# Patient Record
Sex: MALE | Race: WHITE | NOT HISPANIC OR LATINO | Employment: FULL TIME | URBAN - METROPOLITAN AREA
[De-identification: names, ages, dates, MRNs, and addresses within clinical notes are randomized per-mention and may not be internally consistent; named-entity substitution may affect disease eponyms.]

---

## 2018-01-09 NOTE — RESULT NOTES
Verified Results  ([address] [facility]) Lipid Panel [id number] 07:23AM Hailee Lilly     Test Name Result Flag Reference   Cholesterol, Total 177 mg/dL  100-199   Triglycerides 114 mg/dL  0-149   HDL Cholesterol 35 mg/dL L >39   According to ATP-III Guidelines, HDL-C >59 mg/dL is considered a  negative risk factor for CHD     VLDL Cholesterol Shady 23 mg/dL  5-40   LDL Cholesterol Calc 119 mg/dL H 0-99     (LC) CBC/Diff Ambiguous Default [id number] 07:23AM Hailee Lilly     Test Name Result Flag Reference   WBC 8 2 x10E3/uL  3 4-10 8   RBC 4 99 x10E6/uL  4 14-5 80   Hemoglobin 15 2 g/dL  12 6-17 7   Hematocrit 43 9 %  37 5-51 0   MCV 88 fL  79-97   MCH 30 5 pg  26 6-33 0   MCHC 34 6 g/dL  31 5-35 7   RDW 13 5 %  12 3-15 4   Platelets 934 R29C4/MQ  150-379   Neutrophils 59 %     Lymphs 28 %     Monocytes 9 %     Eos 2 %     Basos 1 %     Neutrophils (Absolute) 4 9 x10E3/uL  1 4-7 0   Lymphs (Absolute) 2 3 x10E3/uL  0 7-3 1   Monocytes(Absolute) 0 8 x10E3/uL  0 1-0 9   Eos (Absolute) 0 1 x10E3/uL  0 0-0 4   Baso (Absolute) 0 0 x10E3/uL  0 0-0 2   Immature Granulocytes 1 %     Immature Grans (Abs) 0 1 x10E3/uL  0 0-0 1     (1) COMPREHENSIVE METABOLIC PANEL [id number] 62:39RP Hailee Vijaya     Test Name Result Flag Reference   Glucose, Serum 100 mg/dL H 65-99   BUN 18 mg/dL  6-24   Creatinine, Serum 0 85 mg/dL  0 76-1 27   eGFR If NonAfricn Am 97 mL/min/1 73  >59   eGFR If Africn Am 113 mL/min/1 73  >59   BUN/Creatinine Ratio 21 H 9-20   Sodium, Serum 142 mmol/L  136-144   Potassium, Serum 4 9 mmol/L  3 5-5 2   Chloride, Serum 105 mmol/L     Carbon Dioxide, Total 23 mmol/L  18-29   Calcium, Serum 9 1 mg/dL  8 7-10 2   Protein, Total, Serum 7 1 g/dL  6 0-8 5   Albumin, Serum 4 4 g/dL  3 5-5 5   Globulin, Total 2 7 g/dL  1 5-4 5   A/G Ratio 1 6  1 1-2 5   Bilirubin, Total 0 6 mg/dL  0 0-1 2   Alkaline Phosphatase, S 59 IU/L     AST (SGOT) 20 IU/L  0-40   ALT (SGPT) 32 IU/L  0-44     (LC) PSA Total+% Free [id number] 07:23AM Suzette Suarez     Test Name Result Flag Reference   Prostate Specific Ag, Serum 1 1 ng/mL  0 0-4 0   Roche ECLIA methodology  According to the American Urological Association, Serum PSA should  decrease and remain at undetectable levels after radical  prostatectomy  The AUA defines biochemical recurrence as an initial  PSA value 0 2 ng/mL or greater followed by a subsequent confirmatory  PSA value 0 2 ng/mL or greater  Values obtained with different assay methods or kits cannot be used  interchangeably  Results cannot be interpreted as absolute evidence  of the presence or absence of malignant disease  PSA, Free 0 47 ng/mL  N/A   Roche ECLIA methodology  % Free PSA 42 7 %     The table below lists the probability of prostate cancer for  men with non-suspicious CLAUDIO results and total PSA between  4 and 10 ng/mL, by patient age All Mcpherson, [address] [facility],  209:5938)  % Free PSA       50-64 yr        65-75 yr                    0 00-10 00%        56%             55%                   10 01-15 00%        24%             35%                   15 01-20 00%        17%             23%                   20 01-25 00%        10%             20%                        >25 00%         5%              9%  Please note:  Tejal et al did not make specific                recommendations regarding the use of                percent free PSA for any other population                of men         Discussion/Summary   labs are normal

## 2018-01-13 NOTE — MISCELLANEOUS
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)   5  Colon cancer screening (V76 51) (Z12 11)   6  Dietary counseling (V65 3) (Z71 3)   7  Need for influenza vaccination (V04 81) (Z23)   8  Obesity (278 00) (E66 9)   9  Sciatica (724 3) (M54 30)   10  Varicose veins with swelling (454 8) (I83 899)   11  Weight gain (783 1) (R63 5)    Current Meds   1  Allegra 60 MG TABS (Fexofenadine HCl); TAKE 1 TABLET DAILY; Therapy: (Recorded:42Woe9412) to Recorded   2  Radha 5-20 MG Oral Tablet; TAKE 1 TABLET ONCE DAILY; Therapy: 64NPP1185 to (Evaluate:95Ejb1632); Last Rx:[id number] Ordered   3  Desloratadine 5 MG Oral Tablet; TAKE 1 TABLET DAILY; Therapy: 51QPL3066 to Recorded   4  Fluticasone Propionate 50 MCG/ACT Nasal Suspension; Therapy: 40GPL3583 to Recorded   5  Glucosamine 500 MG TABS; Take 1 tablet daily; Therapy: (Recorded:61Jog2507) to Recorded   6  MoviPrep 100 GM Oral Solution Reconstituted; USE AS DIRECTED; Therapy: 33JUC8794 to (Last Rx:16Nov2015)  Requested for: 10UML6934 Ordered   7  Naproxen 500 MG Oral Tablet; TAKE 1 TABLET EVERY 12 HOURS AS NEEDED; Therapy: 68AJN8650 to (Evaluate:68Zep0387)  Requested for: 25LCE2511; Last   Rx:29Jun2015 Ordered    Allergies    1  Radha TABS    2   No Known Environmental Allergies    Signatures   Electronically signed by : Lidia Wood, ; Feb 10 2016  1:32PM EST                       (Author)

## 2018-10-08 ENCOUNTER — OFFICE VISIT (OUTPATIENT)
Dept: FAMILY MEDICINE CLINIC | Facility: CLINIC | Age: 58
End: 2018-10-08
Payer: COMMERCIAL

## 2018-10-08 DIAGNOSIS — M25.561 ACUTE PAIN OF RIGHT KNEE: ICD-10-CM

## 2018-10-08 DIAGNOSIS — Z23 NEED FOR INFLUENZA VACCINATION: ICD-10-CM

## 2018-10-08 DIAGNOSIS — I10 ESSENTIAL HYPERTENSION: ICD-10-CM

## 2018-10-08 DIAGNOSIS — Z12.11 SCREENING FOR COLON CANCER: ICD-10-CM

## 2018-10-08 DIAGNOSIS — Z00.00 HEALTH MAINTENANCE EXAMINATION: ICD-10-CM

## 2018-10-08 DIAGNOSIS — Z76.89 ENCOUNTER TO ESTABLISH CARE WITH NEW DOCTOR: Primary | ICD-10-CM

## 2018-10-08 PROCEDURE — 90686 IIV4 VACC NO PRSV 0.5 ML IM: CPT | Performed by: FAMILY MEDICINE

## 2018-10-08 PROCEDURE — 90471 IMMUNIZATION ADMIN: CPT | Performed by: FAMILY MEDICINE

## 2018-10-08 PROCEDURE — 99214 OFFICE O/P EST MOD 30 MIN: CPT | Performed by: STUDENT IN AN ORGANIZED HEALTH CARE EDUCATION/TRAINING PROGRAM

## 2018-10-08 RX ORDER — CHLORTHALIDONE 25 MG/1
12.5 TABLET ORAL DAILY
Qty: 30 TABLET | Refills: 2 | Status: SHIPPED | OUTPATIENT
Start: 2018-10-08 | End: 2020-01-29

## 2018-10-08 NOTE — PROGRESS NOTES
Assessment/Plan:    Diagnoses and all orders for this visit:    #1: Encounter to establish care with new doctor    #2: Health maintenance examination  -     Lipid Panel with Direct LDL reflex; Future  -     Comprehensive metabolic panel; Future  -     HEMOGLOBIN A1C W/ EAG ESTIMATION; Future    #3: Screening for colon cancer  Patient not interested in colonoscopy; Discussed Cologuard, for which patient showed interest; Forms Completed    #4: Essential hypertension  Discontinue Radha; Will start Chlorthalidone 25 mg tablet; Take 0 5 tablets (12 5 mg total) by mouth daily    #5: Acute pain of right knee  Likely osteoarthritic changes; Advised OTC NSAIDs prn    #6: Need for influenza vaccination  -     SYRINGE/SINGLE-DOSE VIAL: influenza vaccine, [id number], quadrivalent, 0 5 mL, preservative-free, for patients 3+ yr (FLUZONE)    #7: BMI 34 0-34 9,adult  Counseled on Diet/Lifestyle modification; Encouraged to start a exercise program -- 30min a day 3-4x/week that includes weight-training and cardiovascular resistance training      Subjective:      Patient ID: Angeline Sutton is a 62 y o  male  HPI    62year old male with a PMH of HTN presents to clinic to establish care  Previously followed with Dr Reanna Wynn  Medical history as follows     Past Medical History: HTN, Seasonal Allergies  Past Surgical History: None  Current Medications: Flonase, Allegra, Clarinex; reports that he is not taking Radha  Social History: No Alcohol; Former Smoker, 1ppd, started at age 16 and quit at 22; No Illicit Drug Use  Family History: M - Breast Cancer; F - Rheumatoid; 1 Brother (unsure); Wife recently passed away February 2017 due to Scleroderma   Allergies: Seasonal  Preventative: Last Colonoscopy: Has not had one; interested in Cologuard    Additional Concerns:  Knee Pain;  Worse when Sitting; Better with Ambulation; Reports on and off      The following portions of the patient's history were reviewed and updated as appropriate: allergies, current medications, past family history, past medical history, past social history, past surgical history and problem list     Review of Systems   Constitutional: Negative for chills, diaphoresis, fatigue and fever  HENT: Negative for dental problem, rhinorrhea, sinus pain, sinus pressure and trouble swallowing  Eyes: Negative for visual disturbance  Respiratory: Negative for chest tightness, shortness of breath and wheezing  Cardiovascular: Negative for chest pain, palpitations and leg swelling  Gastrointestinal: Negative for abdominal pain, blood in stool, constipation, diarrhea, nausea and vomiting  Genitourinary: Negative for difficulty urinating, discharge, flank pain, hematuria, penile pain and penile swelling  Musculoskeletal: Positive for myalgias  Negative for arthralgias, back pain, gait problem, joint swelling, neck pain and neck stiffness  Skin: Negative for pallor, rash and wound  Neurological: Negative for dizziness, tremors, syncope, weakness and headaches  Psychiatric/Behavioral: Negative for behavioral problems  Objective:    /80   Pulse (!) 113   Resp 20   Ht 5' 11 5" (1 816 m)   Wt 113 kg (250 lb)   SpO2 97%   BMI 34 38 kg/m²    Repeat BP: 140/90     Physical Exam   Constitutional: He is oriented to person, place, and time  He appears well-developed and well-nourished  No distress  HENT:   Head: Normocephalic  Eyes: Pupils are equal, round, and reactive to light  Conjunctivae and EOM are normal  Right eye exhibits no discharge  Left eye exhibits no discharge  No scleral icterus  Neck: Normal range of motion  Cardiovascular: Normal rate, regular rhythm, normal heart sounds and intact distal pulses  Exam reveals no gallop and no friction rub  No murmur heard  Pulmonary/Chest: Effort normal and breath sounds normal  No respiratory distress  He has no wheezes  He has no rales  He exhibits no tenderness  Abdominal: Soft   Bowel sounds are normal  He exhibits no distension and no mass  There is no tenderness  There is no rebound and no guarding  Genitourinary: Penis normal    Musculoskeletal: Normal range of motion  He exhibits no edema, tenderness or deformity  Neurological: He is alert and oriented to person, place, and time  Skin: No rash noted  He is not diaphoretic  No erythema  No pallor  Nursing note and vitals reviewed

## 2018-10-09 VITALS
HEIGHT: 72 IN | OXYGEN SATURATION: 97 % | DIASTOLIC BLOOD PRESSURE: 80 MMHG | RESPIRATION RATE: 20 BRPM | BODY MASS INDEX: 33.86 KG/M2 | WEIGHT: 250 LBS | SYSTOLIC BLOOD PRESSURE: 162 MMHG | HEART RATE: 113 BPM

## 2020-01-13 ENCOUNTER — OFFICE VISIT (OUTPATIENT)
Dept: URGENT CARE | Facility: CLINIC | Age: 60
End: 2020-01-13
Payer: COMMERCIAL

## 2020-01-13 VITALS
RESPIRATION RATE: 16 BRPM | HEART RATE: 109 BPM | DIASTOLIC BLOOD PRESSURE: 82 MMHG | TEMPERATURE: 99.3 F | OXYGEN SATURATION: 97 % | HEIGHT: 70 IN | SYSTOLIC BLOOD PRESSURE: 131 MMHG | WEIGHT: 278 LBS | BODY MASS INDEX: 39.8 KG/M2

## 2020-01-13 DIAGNOSIS — N20.0 KIDNEY STONE: Primary | ICD-10-CM

## 2020-01-13 PROCEDURE — 99213 OFFICE O/P EST LOW 20 MIN: CPT | Performed by: PREVENTIVE MEDICINE

## 2020-01-13 NOTE — LETTER
January 13, 2020     Patient: Ervin Banerjee   YOB: 1960   Date of Visit: 1/13/2020       To Whom It May Concern: It is my medical opinion that Bridgett Venegas may return to work on 1/13/2020  If you have any questions or concerns, please don't hesitate to call           Sincerely,        Yvette Mazariegos MD    CC: No Recipients

## 2020-01-13 NOTE — PATIENT INSTRUCTIONS
Kidney Stones   AMBULATORY CARE:   Kidney stones  form in the urinary system when the water and waste in your urine are out of balance  When this happens, certain types of waste crystals separate from the urine  The crystals build up and form kidney stones  Kidney stones can be made of uric acid, calcium, phosphate, or oxalate crystals  You may have 1 or more kidney stones  Common symptoms include the following:   · Pain in the middle of your back that moves across to your side or that may spread to your groin    · Nausea and vomiting    · Urge to urinate often, burning feeling when you urinate, or pink or red urine    · Tenderness in your lower back, side, or stomach  Seek care immediately if:   · You have vomiting that is not relieved by medicine  Contact your healthcare provider if:   · You have a fever  · You have trouble passing urine  · You see blood in your urine  · You have severe pain  · You have any questions or concerns about your condition or care  Treatment for kidney stones  may include any of the following:  · NSAIDs , such as ibuprofen, help decrease swelling, pain, and fever  This medicine is available with or without a doctor's order  NSAIDs can cause stomach bleeding or kidney problems in certain people  If you take blood thinner medicine, always ask your healthcare provider if NSAIDs are safe for you  Always read the medicine label and follow directions  · Prescription medicine  may be given  Ask how to take this medicine safely  · Medicines  to balance your electrolytes may be needed  · A procedure or surgery  to remove the kidney stones may be needed if they do not pass on their own  Your treatment will depend on the size and location of your kidney stones  Manage your symptoms:   · Drink plenty of liquids  Your healthcare provider may tell you to drink at least 8 to 12 (eight-ounce) cups of liquids each day   This helps flush out the kidney stones when you urinate  Water is the best liquid to drink  · Strain your urine every time you go to the bathroom  Urinate through a strainer or a piece of thin cloth to catch the stones  Take the stones to your healthcare provider so they can be sent to the lab for tests  This will help your healthcare providers plan the best treatment for you  · Eat a variety of healthy foods  Healthy foods include fruits, vegetables, whole-grain breads, low-fat dairy products, beans, and fish  You may need to limit how much sodium (salt) or protein you eat  Ask for information about the best foods for you  · Exercise regularly  Your stones may pass more easily if you stay active  Ask about the best activities for you  After you pass your kidney stones:  Once you have passed your kidney stones, your healthcare provider may  order a 24-hour urine test  Results from a 24-hour urine test will help your healthcare provider plan ways to prevent more stones from forming  If you are told to do a 24-hour test, your healthcare provider will give you more instructions  Follow up with your healthcare provider as directed:  Write down your questions so you remember to ask them during your visits  © 2017 [facility] Information is for End User's use only and may not be sold, redistributed or otherwise used for commercial purposes  All illustrations and images included in CareNotes® are the copyrighted property of [employer] , [employer]  or Krish Coleman  The above information is an  only  It is not intended as medical advice for individual conditions or treatments  Talk to your doctor, nurse or pharmacist before following any medical regimen to see if it is safe and effective for you

## 2020-01-14 NOTE — PROGRESS NOTES
[address]        NAME: Darci Walls is a 61 y o  male  : 1960    MRN: [id number]  DATE: 2020  TIME: 4:55 PM    Assessment and Plan   Kidney stone [N20 0]  1  Kidney stone           Patient Instructions       Follow up with PCP in 3-5 days  Proceed to  ER if symptoms worsen  Chief Complaint     Chief Complaint   Patient presents with    Flank Pain     had  what  he described as kidney pain x 2 days urgency         History of Present Illness       Flank Pain   This is a new problem  The current episode started in the past 7 days  The problem occurs intermittently  The problem has been resolved since onset  The pain is present in the sacro-iliac  The quality of the pain is described as aching and cramping  The pain does not radiate  The pain is at a severity of 0/10  The patient is experiencing no pain  The symptoms are aggravated by stress  Associated symptoms include dysuria  He has tried nothing for the symptoms  The treatment provided significant relief  Review of Systems   Review of Systems   Constitutional: Negative  HENT: Negative  Respiratory: Negative  Cardiovascular: Negative  Genitourinary: Positive for dysuria and flank pain  Musculoskeletal: Positive for back pain           Current Medications       Current Outpatient Medications:     chlorthalidone 25 mg tablet, Take 0 5 tablets (12 5 mg total) by mouth daily (Patient not taking: Reported on 2020), Disp: 30 tablet, Rfl: 2    desloratadine (CLARINEX) 5 MG tablet, Take 1 tablet by mouth daily, Disp: , Rfl:     fexofenadine (ALLEGRA ALLERGY) 60 MG tablet, Take 1 tablet by mouth daily, Disp: , Rfl:     fluticasone (FLONASE) 50 mcg/act nasal spray, into each nostril, Disp: , Rfl:     Glucosamine-Chondroitin-Vit D3 750-600-500 MG-MG-UNIT TABS, Take 1 tablet by mouth daily, Disp: , Rfl:     naproxen (NAPROSYN) 500 mg tablet, Take 1 tablet by mouth every 12 (twelve) hours as needed, Disp: , Rfl:   PEG 3350-KCl-NaCl-NaSulf-Na Asc-C (MOVIPREP) 100 g, Take by mouth, Disp: , Rfl:     Current Allergies     Allergies as of 01/13/2020 - Reviewed 01/13/2020   Allergen Reaction Noted    Amlodipine-olmesartan Swelling 07/13/2015            The following portions of the patient's history were reviewed and updated as appropriate: allergies, current medications, past family history, past medical history, past social history, past surgical history and problem list      Past Medical History:   Diagnosis Date    Ankle edema 07/13/2015    Obesity     Organic impotence     Seasonal allergies        No past surgical history on file  Family History   Problem Relation Age of Onset    Alzheimer's disease Mother     Cancer Mother          Medications have been verified  Objective   /82   Pulse (!) 109   Temp 99 3 °F (37 4 °C)   Resp 16   Ht 5' 10" (1 778 m)   Wt 126 kg (278 lb)   SpO2 97%   BMI 39 89 kg/m²        Physical Exam     Physical Exam   Constitutional: He appears well-developed and well-nourished  HENT:   Head: Normocephalic  Cardiovascular: Normal rate and regular rhythm  Pulmonary/Chest: Effort normal and breath sounds normal    Abdominal: Soft  Musculoskeletal: Normal range of motion

## 2020-01-29 ENCOUNTER — OFFICE VISIT (OUTPATIENT)
Dept: FAMILY MEDICINE CLINIC | Facility: CLINIC | Age: 60
End: 2020-01-29
Payer: COMMERCIAL

## 2020-01-29 VITALS
RESPIRATION RATE: 16 BRPM | DIASTOLIC BLOOD PRESSURE: 88 MMHG | TEMPERATURE: 98.7 F | WEIGHT: 275 LBS | SYSTOLIC BLOOD PRESSURE: 128 MMHG | HEIGHT: 70 IN | HEART RATE: 84 BPM | BODY MASS INDEX: 39.37 KG/M2

## 2020-01-29 DIAGNOSIS — Z13.6 SCREENING FOR CARDIOVASCULAR, RESPIRATORY, AND GENITOURINARY DISEASES: ICD-10-CM

## 2020-01-29 DIAGNOSIS — Z13.1 SCREENING FOR DIABETES MELLITUS (DM): ICD-10-CM

## 2020-01-29 DIAGNOSIS — Z13.89 SCREENING FOR CARDIOVASCULAR, RESPIRATORY, AND GENITOURINARY DISEASES: ICD-10-CM

## 2020-01-29 DIAGNOSIS — J30.2 SEASONAL ALLERGIC RHINITIS, UNSPECIFIED TRIGGER: ICD-10-CM

## 2020-01-29 DIAGNOSIS — Z23 NEED FOR VACCINATION: ICD-10-CM

## 2020-01-29 DIAGNOSIS — Z12.5 PROSTATE CANCER SCREENING: ICD-10-CM

## 2020-01-29 DIAGNOSIS — Z13.83 SCREENING FOR CARDIOVASCULAR, RESPIRATORY, AND GENITOURINARY DISEASES: ICD-10-CM

## 2020-01-29 DIAGNOSIS — E66.9 OBESITY (BMI 30-39.9): ICD-10-CM

## 2020-01-29 DIAGNOSIS — Z23 IMMUNIZATION DUE: ICD-10-CM

## 2020-01-29 DIAGNOSIS — Z12.11 COLON CANCER SCREENING: ICD-10-CM

## 2020-01-29 DIAGNOSIS — R73.03 PREDIABETES: ICD-10-CM

## 2020-01-29 DIAGNOSIS — Z00.00 HEALTHCARE MAINTENANCE: Primary | ICD-10-CM

## 2020-01-29 PROCEDURE — 90471 IMMUNIZATION ADMIN: CPT

## 2020-01-29 PROCEDURE — 90472 IMMUNIZATION ADMIN EACH ADD: CPT

## 2020-01-29 PROCEDURE — 99396 PREV VISIT EST AGE 40-64: CPT | Performed by: FAMILY MEDICINE

## 2020-01-29 PROCEDURE — 90715 TDAP VACCINE 7 YRS/> IM: CPT

## 2020-01-29 PROCEDURE — 90682 RIV4 VACC RECOMBINANT DNA IM: CPT

## 2020-01-29 RX ORDER — DIPHENOXYLATE HYDROCHLORIDE AND ATROPINE SULFATE 2.5; .025 MG/1; MG/1
1 TABLET ORAL DAILY
COMMUNITY
End: 2020-12-28

## 2020-01-29 RX ORDER — FEXOFENADINE HYDROCHLORIDE 60 MG/1
60 TABLET, FILM COATED ORAL DAILY
Qty: 90 TABLET | Refills: 1 | Status: SHIPPED | OUTPATIENT
Start: 2020-01-29 | End: 2020-07-09

## 2020-01-29 RX ORDER — FLUTICASONE PROPIONATE 50 MCG
2 SPRAY, SUSPENSION (ML) NASAL DAILY
Qty: 3 BOTTLE | Refills: 1 | Status: SHIPPED | OUTPATIENT
Start: 2020-01-29 | End: 2020-07-27

## 2020-01-29 NOTE — PROGRESS NOTES
Assessment/Plan:    No problem-specific Assessment & Plan notes found for this encounter  cpe today    May call in future if needs urology for kidney stone hx and flank pain    Monitor bp, work on TLC, no bp meds at present, has yearly CDL per pt    Back pain better  No hematuria    AR stable, cont meds if covered by insurance, otherwise get otc     Diagnoses and all orders for this visit:    Healthcare maintenance    Need for vaccination  -     influenza vaccine, quadrivalent, recombinant, PF, 0 5 mL    Colon cancer screening  -     Ambulatory referral to Gastroenterology; Future    Screening for cardiovascular, respiratory, and genitourinary diseases  -     Lipid Panel with Direct LDL reflex; Future    Screening for diabetes mellitus (DM)  -     Comprehensive metabolic panel; Future    Prostate cancer screening  -     PSA, Total Screen; Future    Immunization due  -     Cancel: influenza vaccine, 9831-2717, quadrivalent, recombinant, PF, 0 5 mL, for patients 18 yr+ (FLUBLOK)  -     TDAP VACCINE GREATER THAN OR EQUAL TO 6YO IM    Seasonal allergic rhinitis, unspecified trigger  -     fexofenadine (ALLEGRA ALLERGY) 60 MG tablet; Take 1 tablet (60 mg total) by mouth daily  -     fluticasone (FLONASE) 50 mcg/act nasal spray; 2 sprays into each nostril daily    Obesity (BMI 30-39  9)    Prediabetes    Other orders  -     multivitamin (THERAGRAN) TABS; Take 1 tablet by mouth daily              Return if symptoms worsen or fail to improve  Subjective:      Patient ID: Earlene Lay is a 61 y o  male      Chief Complaint   Patient presents with   Savita Barefoot       HPI  Was at [facility] over 3y ago  htn on meds at that time, stopped  Wife   from scleroderma complications  Stopped bp meds over 2y  CDL every year per pt  Has been good per pt last few years    No tob/etoh  No exercise program  Wt gain in past 2y, 50#  Planning better diet past 2w    Was depressed before but getting better  Prediabetic in 2016 at 100  Last labs 2016    No fam hx of colon cancer  No colonoscopy      The following portions of the patient's history were reviewed and updated as appropriate: allergies, current medications, past family history, past medical history, past social history, past surgical history and problem list     Review of Systems   Respiratory: Negative for shortness of breath  Cardiovascular: Negative for chest pain and leg swelling  Current Outpatient Medications   Medication Sig Dispense Refill    multivitamin (THERAGRAN) TABS Take 1 tablet by mouth daily      fexofenadine (ALLEGRA ALLERGY) 60 MG tablet Take 1 tablet (60 mg total) by mouth daily 90 tablet 1    fluticasone (FLONASE) 50 mcg/act nasal spray 2 sprays into each nostril daily 3 Bottle 1    Glucosamine-Chondroitin-Vit D3 750-600-500 MG-MG-UNIT TABS Take 1 tablet by mouth daily      naproxen (NAPROSYN) 500 mg tablet Take 1 tablet by mouth every 12 (twelve) hours as needed      PEG 3350-KCl-NaCl-NaSulf-Na Asc-C (MOVIPREP) 100 g Take by mouth       No current facility-administered medications for this visit  Objective:    /88   Pulse 84   Temp 98 7 °F (37 1 °C)   Resp 16   Ht 5' 10" (1 778 m)   Wt 125 kg (275 lb)   BMI 39 46 kg/m²        Physical Exam   Constitutional: He appears well-developed  No distress  HENT:   Head: Normocephalic  Right Ear: External ear normal    Left Ear: External ear normal    Mouth/Throat: Oropharynx is clear and moist  No oropharyngeal exudate  Eyes: Conjunctivae are normal  Left eye exhibits no discharge  No scleral icterus  Neck: Neck supple  No thyromegaly present  Cardiovascular: Normal rate, regular rhythm and intact distal pulses  No murmur heard  Pulmonary/Chest: Effort normal and breath sounds normal  No respiratory distress  He has no wheezes  Abdominal: Soft  Bowel sounds are normal  There is no tenderness  There is no guarding  No hernia  Genitourinary: Rectum normal, prostate normal and penis normal    Musculoskeletal: He exhibits no edema or deformity  Neurological: He is alert  He exhibits normal muscle tone  Skin: Skin is warm and dry  No pallor  Psychiatric: His behavior is normal  Thought content normal    Nursing note and vitals reviewed  BMI Counseling: Body mass index is 39 46 kg/m²  The BMI is above normal  Nutrition recommendations include moderation in carbohydrate intake  Exercise recommendations include exercising 3-5 times per week  No pharmacotherapy was ordered             Cici Enamorado DO

## 2020-01-29 NOTE — LETTER
January 29, 2020     Patient: Master Jefferson   YOB: 1960   Date of Visit: 1/29/2020       To Whom it May Concern:    Bill Najera is under my professional care  He was seen in my office on 1/29/2020  Excuse from work today  If you have any questions or concerns, please don't hesitate to call           Sincerely,          Maximiliano Hanson DO        CC: No Recipients

## 2020-01-29 NOTE — PATIENT INSTRUCTIONS
Jian Pierce is a good, though not equivalent, alternative to a colonoscopy for low risk individuals  You can contact our office if you would like this ordered for you, after you find out if your insurance company will cover it  A normal result may be valid for 3 years but then the test would be repeated every 3 years

## 2020-02-13 ENCOUNTER — OFFICE VISIT (OUTPATIENT)
Dept: URGENT CARE | Facility: CLINIC | Age: 60
End: 2020-02-13
Payer: COMMERCIAL

## 2020-02-13 VITALS
HEIGHT: 71 IN | SYSTOLIC BLOOD PRESSURE: 160 MMHG | WEIGHT: 285 LBS | TEMPERATURE: 98.6 F | DIASTOLIC BLOOD PRESSURE: 97 MMHG | OXYGEN SATURATION: 97 % | RESPIRATION RATE: 16 BRPM | HEART RATE: 78 BPM | BODY MASS INDEX: 39.9 KG/M2

## 2020-02-13 DIAGNOSIS — J06.9 VIRAL URI WITH COUGH: Primary | ICD-10-CM

## 2020-02-13 PROCEDURE — 3080F DIAST BP >= 90 MM HG: CPT | Performed by: PHYSICIAN ASSISTANT

## 2020-02-13 PROCEDURE — 3077F SYST BP >= 140 MM HG: CPT | Performed by: PHYSICIAN ASSISTANT

## 2020-02-13 PROCEDURE — 3008F BODY MASS INDEX DOCD: CPT | Performed by: PHYSICIAN ASSISTANT

## 2020-02-13 PROCEDURE — 1036F TOBACCO NON-USER: CPT | Performed by: PHYSICIAN ASSISTANT

## 2020-02-13 PROCEDURE — 99213 OFFICE O/P EST LOW 20 MIN: CPT | Performed by: PHYSICIAN ASSISTANT

## 2020-02-13 NOTE — PATIENT INSTRUCTIONS
Begin an expectorant such as Mucinex  Nasal saline spray or Neti-pot to rinse the nasal passages  Tylenol or Motrin may be taken for fever and discomfort  Check your package labeling as some cold medications already contain fever reducers  Chloraseptic spray, saltwater gargles, warm tea with honey, and throat lozenges may be relieving of throat discomfort  Use a cool mist humidifier at bedtime, turning on hours prior to bed with your bedroom doors shut for maximum relief  Follow up with your family doctor in 3-5 days  Proceed to the ER if symptoms worsen

## 2020-02-13 NOTE — LETTER
February 13, 2020     Patient: Radha Loss   YOB: 1960   Date of Visit: 2/13/2020       To Whom It May Concern: It is my medical opinion that Waynard Pain 2/15/2020 or sooner if feeling better  If you have any questions or concerns, please don't hesitate to call           Sincerely,        Michael Arbeu PA-C

## 2020-02-13 NOTE — PROGRESS NOTES
[facility]        NAME: Davon Buck is a 61 y o  male  : 1960    MRN: [id number]  DATE: 2020  TIME: 11:40 AM    Assessment and Plan   Viral URI with cough [J06 9, B97 89]  1  Viral URI with cough       Patient Instructions   Begin an expectorant such as Mucinex  Nasal saline spray or Neti-pot to rinse the nasal passages  Tylenol or Motrin may be taken for fever and discomfort  Check your package labeling as some cold medications already contain fever reducers  Chloraseptic spray, saltwater gargles, warm tea with honey, and throat lozenges may be relieving of throat discomfort  Use a cool mist humidifier at bedtime, turning on hours prior to bed with your bedroom doors shut for maximum relief  Follow up with your family doctor in 3-5 days  Proceed to the ER if symptoms worsen  The diagnosis, viral etiology, expected course of illness, and treatment plan were reviewed  All questions answered  Precautions given  Patient verbalized understanding and agreement with the treatment plan  Chief Complaint     Chief Complaint   Patient presents with    Sore Throat     patient complains of sore throat, headache, dizziness, cough, congestion started a few days ago  Had flu shot      History of Present Illness     77-year-old male presenting with c/o sore throat x 2 days  Reprots fatigue, feeling feverish-no temperature taken, HA and dizzines  Notes nasal congestion, postnasal drip, sore throat and cough  Cough is harsh  Notes he was somewhat short of breath with lifting boxes at work the other day, otherwise no resting SOB, wheezing, chest pain, or palpitations  No associated nausea, vomiting, or diarrhea  No treatments tried  Sick contacts at work with the flu  No recent travel  Nonsmoker  Had flu shot  Review of Systems   Review of Systems   Constitutional: Positive for fatigue and fever  Negative for chills and diaphoresis     HENT: Positive for congestion, postnasal drip and sore throat  Negative for ear pain and rhinorrhea  Respiratory: Positive for cough  Negative for wheezing  Cardiovascular: Negative for chest pain and palpitations  Gastrointestinal: Negative for abdominal pain, diarrhea, nausea and vomiting  Musculoskeletal: Negative for myalgias  Skin: Negative for rash  Neurological: Positive for dizziness and headaches  Current Medications       Current Outpatient Medications:     fexofenadine (ALLEGRA ALLERGY) 60 MG tablet, Take 1 tablet (60 mg total) by mouth daily, Disp: 90 tablet, Rfl: 1    fluticasone (FLONASE) 50 mcg/act nasal spray, 2 sprays into each nostril daily, Disp: 3 Bottle, Rfl: 1    multivitamin (THERAGRAN) TABS, Take 1 tablet by mouth daily, Disp: , Rfl:     Glucosamine-Chondroitin-Vit D3 750-600-500 MG-MG-UNIT TABS, Take 1 tablet by mouth daily, Disp: , Rfl:     naproxen (NAPROSYN) 500 mg tablet, Take 1 tablet by mouth every 12 (twelve) hours as needed, Disp: , Rfl:     PEG 3350-KCl-NaCl-NaSulf-Na Asc-C (MOVIPREP) 100 g, Take by mouth, Disp: , Rfl:     Current Allergies     Allergies as of 02/13/2020    (No Known Allergies)            The following portions of the patient's history were reviewed and updated as appropriate: allergies, current medications, past family history, past medical history, past social history, past surgical history and problem list      Past Medical History:   Diagnosis Date    Ankle edema 07/13/2015    Obesity     Organic impotence     Seasonal allergies        History reviewed  No pertinent surgical history  Family History   Problem Relation Age of Onset    Alzheimer's disease Mother     Cancer Mother          Medications have been verified          Objective   /97 (BP Location: Right arm, Patient Position: Sitting, Cuff Size: Large)   Pulse 78   Temp 98 6 °F (37 °C) (Tympanic)   Resp 16   Ht 5' 11" (1 803 m)   Wt 129 kg (285 lb)   SpO2 97%   BMI 39 75 kg/m²        Physical Exam Physical Exam   Constitutional: Vital signs are normal  He appears well-developed and well-nourished  He is cooperative  He appears ill  No distress  HENT:   Head: Normocephalic and atraumatic  Right Ear: Hearing, tympanic membrane, external ear and ear canal normal    Left Ear: Hearing, tympanic membrane, external ear and ear canal normal    Nose: Rhinorrhea present  Mouth/Throat: Uvula is midline, oropharynx is clear and moist and mucous membranes are normal  Mucous membranes are not pale, not dry and not cyanotic  No oral lesions  No uvula swelling  No oropharyngeal exudate, posterior oropharyngeal edema, posterior oropharyngeal erythema or tonsillar abscesses  Mildly inflamed nasal mucosa  Eyes: Conjunctivae and lids are normal  Right eye exhibits no discharge and no exudate  Left eye exhibits no discharge and no exudate  Neck: Trachea normal and phonation normal  Neck supple  No tracheal tenderness present  No neck rigidity  No edema and no erythema present  Cardiovascular: Normal rate, regular rhythm and normal heart sounds  Exam reveals no distant heart sounds  Pulmonary/Chest: Effort normal and breath sounds normal  No stridor  No respiratory distress  He has no decreased breath sounds  He has no wheezes  He has no rhonchi  He has no rales  Abdominal: Soft  Bowel sounds are normal  He exhibits no distension and no mass  There is no tenderness  There is no rigidity, no rebound and no guarding  Lymphadenopathy:     He has no cervical adenopathy  Neurological: He is alert  He is not disoriented  No cranial nerve deficit  Coordination and gait normal    Skin: Skin is warm, dry and intact  No rash noted  He is not diaphoretic  No erythema  No pallor  Psychiatric: He has a normal mood and affect  His behavior is normal  Judgment and thought content normal    Nursing note and vitals reviewed

## 2020-03-13 ENCOUNTER — OFFICE VISIT (OUTPATIENT)
Dept: URGENT CARE | Facility: CLINIC | Age: 60
End: 2020-03-13
Payer: COMMERCIAL

## 2020-03-13 ENCOUNTER — APPOINTMENT (OUTPATIENT)
Dept: RADIOLOGY | Facility: CLINIC | Age: 60
End: 2020-03-13
Payer: COMMERCIAL

## 2020-03-13 VITALS
SYSTOLIC BLOOD PRESSURE: 186 MMHG | RESPIRATION RATE: 20 BRPM | HEIGHT: 72 IN | OXYGEN SATURATION: 97 % | TEMPERATURE: 98.5 F | DIASTOLIC BLOOD PRESSURE: 104 MMHG | BODY MASS INDEX: 38.47 KG/M2 | WEIGHT: 284 LBS | HEART RATE: 81 BPM

## 2020-03-13 DIAGNOSIS — M25.469 KNEE SWELLING: ICD-10-CM
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
## 2020-03-13 DIAGNOSIS — S76.312A LEFT HAMSTRING STRAIN, INITIAL ENCOUNTER: ICD-10-CM

## 2020-03-13 DIAGNOSIS — M25.469 KNEE SWELLING: Primary | ICD-10-CM

## 2020-03-13 DIAGNOSIS — M17.12 OSTEOARTHRITIS OF LEFT KNEE, UNSPECIFIED OSTEOARTHRITIS TYPE: ICD-10-CM

## 2020-03-13 PROCEDURE — 3077F SYST BP >= 140 MM HG: CPT | Performed by: PHYSICIAN ASSISTANT

## 2020-03-13 PROCEDURE — 3008F BODY MASS INDEX DOCD: CPT | Performed by: PHYSICIAN ASSISTANT

## 2020-03-13 PROCEDURE — 99213 OFFICE O/P EST LOW 20 MIN: CPT | Performed by: PHYSICIAN ASSISTANT

## 2020-03-13 PROCEDURE — 3080F DIAST BP >= 90 MM HG: CPT | Performed by: PHYSICIAN ASSISTANT

## 2020-03-13 PROCEDURE — 73564 X-RAY EXAM KNEE 4 OR MORE: CPT

## 2020-03-13 PROCEDURE — 1036F TOBACCO NON-USER: CPT | Performed by: PHYSICIAN ASSISTANT

## 2020-03-13 NOTE — LETTER
March 13, 2020     Patient: Subha Mendoza   YOB: 1960   Date of Visit: 3/13/2020       To Whom it May Concern:    Bhaskar Goodpasture was seen in my clinic on 3/13/2020  He may return to work on 3/17/2020  If you have any questions or concerns, please don't hesitate to call           Sincerely,          Camilla Edwards PA-C        CC: No Recipients

## 2020-03-13 NOTE — PROGRESS NOTES
[facility]        NAME: Shaun Childress is a 61 y o  male  : 1960    MRN: [id number]  DATE: 2020  TIME: 12:09 PM    Assessment and Plan   Knee swelling [M25 469]  1  Knee swelling  XR knee 4+ vw left injury   2  Left hamstring strain, initial encounter     3  Injury while descending stairs     4  Osteoarthritis of left knee, unspecified osteoarthritis type           Patient Instructions     Discussed condition with patient  X-ray of the left knee reveals osteoarthritis but there are no acute findings  I suspect that he has sustained strain of his distal left hamstring due to a hyper extension of the knee  I recommended ice/heat, over-the-counter ibuprofen, elevation, gentle intermittent stretching, and limited weight-bearing  If his condition persists or worsens, then I would recommend ortho consult  Follow up with PCP in 3-5 days  Proceed to  ER if symptoms worsen  Chief Complaint     Chief Complaint   Patient presents with    Knee Pain     Pt reports of left knee pain with swelling after misstep on stairs  Pt's knee appears swollen         History of Present Illness       Patient presents after injuring his left lower extremity late last night  He reports that he was walking downstairs and when he reached last step, he stepped down with his left foot and immediately felt pain in his posterior left knee  He denies any anterior knee joint pain or swelling  He reports it is worse with walking weight-bearing  Denies previous significant issues with the left knee  He has not applied ice or heat or taken anything over-the-counter to this point for symptoms  Review of Systems   Review of Systems   Constitutional: Negative  Respiratory: Negative  Cardiovascular: Negative  Gastrointestinal: Negative  Genitourinary: Negative      Musculoskeletal:        Left posterior knee pain status post injury         Current Medications       Current Outpatient Medications:    fexofenadine (ALLEGRA ALLERGY) 60 MG tablet, Take 1 tablet (60 mg total) by mouth daily, Disp: 90 tablet, Rfl: 1    fluticasone (FLONASE) 50 mcg/act nasal spray, 2 sprays into each nostril daily, Disp: 3 Bottle, Rfl: 1    Glucosamine-Chondroitin-Vit D3 750-600-500 MG-MG-UNIT TABS, Take 1 tablet by mouth daily, Disp: , Rfl:     multivitamin (THERAGRAN) TABS, Take 1 tablet by mouth daily, Disp: , Rfl:     naproxen (NAPROSYN) 500 mg tablet, Take 1 tablet by mouth every 12 (twelve) hours as needed, Disp: , Rfl:     PEG 3350-KCl-NaCl-NaSulf-Na Asc-C (MOVIPREP) 100 g, Take by mouth, Disp: , Rfl:     Current Allergies     Allergies as of 03/13/2020    (No Known Allergies)            The following portions of the patient's history were reviewed and updated as appropriate: allergies, current medications, past family history, past medical history, past social history, past surgical history and problem list      Past Medical History:   Diagnosis Date    Ankle edema 07/13/2015    Obesity     Organic impotence     Seasonal allergies        History reviewed  No pertinent surgical history  Family History   Problem Relation Age of Onset    Alzheimer's disease Mother     Cancer Mother          Medications have been verified  Objective   BP (!) 186/104   Pulse 81   Temp 98 5 °F (36 9 °C)   Resp 20   Ht 6' (1 829 m)   Wt 129 kg (284 lb)   SpO2 97%   BMI 38 52 kg/m²        Physical Exam     Physical Exam   Constitutional: He is oriented to person, place, and time  He appears well-developed and well-nourished  No distress  Musculoskeletal:   Left posterior knee with tenderness to palpation popliteal fossa and superiorly over the distal hamstring but there is no ecchymosis or other deformity noted  Knee joint is nontender and exhibits full range of motion in flexion but patient reports pain posteriorly with passive knee extension  Patient has antalgic gait    Pain not worsened with varus or valgus stress on joint  Neurological: He is alert and oriented to person, place, and time  Psychiatric: He has a normal mood and affect  Vitals reviewed

## 2020-03-13 NOTE — PATIENT INSTRUCTIONS
Hamstring Injury   WHAT YOU NEED TO KNOW:   A hamstring injury is a bruise, strain, or tear to one of your hamstring muscles  Your hamstring muscles are in the back of your thigh and help bend and straighten your leg  DISCHARGE INSTRUCTIONS:   Medicines:   · Medicines  can help decrease pain and swelling  · Take your medicine as directed  Contact your healthcare provider if you think your medicine is not helping or if you have side effects  Tell him of her if you are allergic to any medicine  Keep a list of the medicines, vitamins, and herbs you take  Include the amounts, and when and why you take them  Bring the list or the pill bottles to follow-up visits  Carry your medicine list with you in case of an emergency  Self-care:   · Rest  your hamstring muscles as directed  · You may need to use crutches  until you can put weight on your injured leg without pain  This will help decrease stress and strain on your hamstring muscles  · Apply ice  on the back of your thigh for 15 to 20 minutes every hour or as directed  Use an ice pack, or put crushed ice in a plastic bag  Cover it with a towel  Ice helps prevent tissue damage and decreases swelling and pain  · Wear an elastic bandage  to help decrease swelling  It should be snug but not tight  · Elevate  your leg above the level of your heart as often as you can  This will help decrease swelling and pain  Prop your leg on pillows or blankets to keep it elevated comfortably  · Go to physical therapy  A physical therapist teaches you exercises to help improve movement and strength, and to decrease pain  Prevent another hamstring injury:   · Ask when you can return to your usual activities  You may injure your hamstring muscles more if you start activity too soon  · Warm up and stretch before and after you exercise  This helps loosen your muscles and decrease stress on your hamstring muscles   Slowly increase time, distance, and how often you train  A sudden increase may cause injury  Follow up with your healthcare provider as directed:  Write down your questions so you remember to ask them during your visits  Contact your healthcare provider if:   · You have a fever  · Your signs and symptoms do not improve with treatment  · You have questions or concerns about your condition or care  Return to the emergency department if:   · Your lower leg or foot is pale or blue, and feels cool when you touch it  · You have severe pain  · You cannot bend or straighten your leg  © 2017 [employer]  Information is for End User's use only and may not be sold, redistributed or otherwise used for commercial purposes  All illustrations and images included in CareNotes® are the copyrighted property of [employer] M [employer]  or [employer]  The above information is an  only  It is not intended as medical advice for individual conditions or treatments  Talk to your doctor, nurse or pharmacist before following any medical regimen to see if it is safe and effective for you  Hamstring Exercises   WHAT YOU NEED TO KNOW:   Hamstring exercises help strengthen and stretch the muscles that support your lower back, hips, and knee  This decreases pain, improves movement, and decreases your risk of future injury  DISCHARGE INSTRUCTIONS:   Contact your healthcare provider if:   · You have sharp or worsening pain during exercise or at rest     · You have questions or concern about your condition, care, or exercise program   Exercise safely:   · Move slowly and smoothly  Avoid fast or jerky motions  This will help prevent another injury  · Breathe normally  Do not hold your breath  It is important to breathe in and out so you do not tense up during exercise  Tension could prevent your muscles from stretching  · Do the exercises and stretches on both legs  Do this so the muscles on both legs remain strong and flexible  · Stop if you feel sharp pain or an increase in pain  Stop the exercise and contact your healthcare provider if you have these symptoms  It is normal to feel some discomfort, such as a dull ache, during exercise  Regular exercise will help decrease your discomfort over time  · Warm up before you stretch and exercise  This will help prevent an injury  Walk or ride a stationary bike for 5 to 10 minutes  How to perform stretching exercises:  Ask your healthcare provider how often to do these stretches:  · Hamstring stretch with a towel:  Lie on your back on the floor  Bend both legs so your feet rest flat  Lift one leg off the floor and loop a towel around your foot  Grasp the ends of the towel and slowly straighten your lifted leg  Use the towel to gently pull your leg toward you until you feel the stretch  Keep your leg straight and your foot flexed toward your body  Hold for 30 seconds  Use a longer towel if needed  · Sitting hamstring stretch:  Sit on the floor with both legs straight in front of you  Do not point your toes or flex your feet  Place your palms on the floor and slide your hands forward until you feel the stretch  Keep your back straight and do not lock your knees  Hold the stretch for 30 seconds  · Standing hamstring stretch:  Stand with your feet hips distance apart  Place one leg so it rests on a firm surface, such as a table or chair  Keep your toes pointing up  Slide both hands down the outside of your leg until you feel a stretch  Keep your chest lifted and your back straight  Hold for 30 seconds  · Sitting wide-leg stretch:  Sit on the floor and extend your legs as wide as possible  Keep your legs straight and lean over one leg  Slide your hands forward until you feel a stretch  Keep your chest lifted and your back straight  Hold for 30 seconds  How to perform strengthening exercises:  Always do strengthening exercises after you stretch   As you get stronger your healthcare provider may tell you to you add weights or more repetitions to your strengthening exercises  · Hamstring curls:  Place your hand on a wall or the back of a chair for balance  Place the weight in one of your legs  Lift the other leg and raise your heel toward your buttocks  Hold for 5 seconds  Slowly lower your leg until it is a few inches off the floor  Do 3 sets of 10  Repeat on other side  · Straight leg raise:  Lie on the floor with your face down  Rest your forehead on your folded arms  Keep your body in a straight line  Keep your hip bones on the floor, and tighten the butt and thigh muscles of your injured leg  Keep one leg straight and raise it toward the ceiling as high as you can  Hold for 5 seconds  Slowly return to the starting position  Do 3 sets of 10  Repeat on other side  · Half squats:  Stand with your feet shoulder distance apart  Rest your hands on the front of your thighs or reach them out in front of you  You may hold on to the back of a chair or wall for balance  Keep your chest lifted and lower your hips about 10 inches, as if you are going to sit  Make sure your weight is in your heels and hold for 5 seconds  Keep your weight in your heels and slowly stand  Do 3 sets of 10  Follow up with your healthcare provider as directed:  Write down your questions so you remember to ask them during your visits  © 2017 [employer] [employer] Information is for End User's use only and may not be sold, redistributed or otherwise used for commercial purposes  All illustrations and images included in CareNotes® are the copyrighted property of [employer] M , Inc  or Krish Coleman  The above information is an  only  It is not intended as medical advice for individual conditions or treatments  Talk to your doctor, nurse or pharmacist before following any medical regimen to see if it is safe and effective for you      Osteoarthritis WHAT YOU NEED TO KNOW:   Osteoarthritis (OA) occurs when cartilage (tissue that cushions a joint) wears away slowly and causes the bones to rub together  OA is a long-term condition that often affects the hands, neck, lower back, knees, and hips  OA is also called arthrosis or degenerative joint disease  DISCHARGE INSTRUCTIONS:   Return to the emergency department if:   · You have severe pain  · You cannot move your joint  Contact your healthcare provider if:   · You have a fever  · Your joint is red and tender  · You have questions or concerns about your condition or care  Medicines:   · Acetaminophen  is used to decrease pain  It is available without a doctor's order  Ask how much to take and how often to take it  Follow directions  Acetaminophen can cause liver damage if not taken correctly  · NSAIDs , such as ibuprofen, help decrease swelling, pain, and fever  This medicine is available with or without a doctor's order  NSAIDs can cause stomach bleeding or kidney problems in certain people  If you take blood thinner medicine, always ask your healthcare provider if NSAIDs are safe for you  Always read the medicine label and follow directions  · Prescription pain medicine  may be given to decrease severe pain if other medicines do not work  Take the medicine as directed  Do not wait until the pain is severe before you take your medicine  · Take your medicine as directed  Contact your healthcare provider if you think your medicine is not helping or if you have side effects  Tell him or her if you are allergic to any medicine  Keep a list of the medicines, vitamins, and herbs you take  Include the amounts, and when and why you take them  Bring the list or the pill bottles to follow-up visits  Carry your medicine list with you in case of an emergency  Follow up with your healthcare provider as directed:  Write down your questions so you remember to ask them during your visits     Go to physical therapy as directed:  A physical therapist teaches you exercises to help improve movement and strength, and to decrease pain in your joints  The exercises also help lower your risk for loss of function  Manage your OA:   · Stay active  Physical activity may reduce your pain and improve your ability to do daily activities  Avoid activities that cause pain  Ask your healthcare provider what type of exercise would be best for you  · Maintain a healthy weight  This helps decrease the strain on the joints in your back, hips, knees, ankles, and feet  Ask your healthcare provider how much you should weigh  Ask him to help you create a weight loss plan if you are overweight  · Use heat or ice  on your joints as directed  Heat and ice help decrease pain, swelling, and muscle spasms  Use a heating pad on a low setting or take a warm bath  Use an ice pack, or put crushed ice in a plastic bag  Cover it with a towel  · Massage  the muscles around the joint to relieve pain and stiffness  · Use a cane, crutches, or a walker  to protect and relieve pressure on your ankle, knee, and hip joints  You may also be prescribed shoe inserts to decrease pressure in your joints  · Wear flat or low-heeled shoes  This will help decrease pain and reduce pressure on your ankle, knee, and hip joints  © 2017 [address] Information is for End User's use only and may not be sold, redistributed or otherwise used for commercial purposes  All illustrations and images included in CareNotes® are the copyrighted property of [employer]  or Krish Coleman  The above information is an  only  It is not intended as medical advice for individual conditions or treatments  Talk to your doctor, nurse or pharmacist before following any medical regimen to see if it is safe and effective for you

## 2020-03-18 LAB
ALBUMIN SERPL-MCNC: 4.3 G/DL (ref 3.8–4.9)
ALBUMIN/GLOB SERPL: 1.5 {RATIO} (ref 1.2–2.2)
ALP SERPL-CCNC: 66 IU/L (ref 39–117)
ALT SERPL-CCNC: 24 IU/L (ref 0–44)
AST SERPL-CCNC: 19 IU/L (ref 0–40)
BILIRUB SERPL-MCNC: 0.7 MG/DL (ref 0–1.2)
BUN SERPL-MCNC: 14 MG/DL (ref 6–24)
BUN/CREAT SERPL: 16 (ref 9–20)
CALCIUM SERPL-MCNC: 9.6 MG/DL (ref 8.7–10.2)
CHLORIDE SERPL-SCNC: 105 MMOL/L (ref 96–106)
CHOLEST SERPL-MCNC: 154 MG/DL (ref 100–199)
CO2 SERPL-SCNC: 24 MMOL/L (ref 20–29)
CREAT SERPL-MCNC: 0.88 MG/DL (ref 0.76–1.27)
GLOBULIN SER-MCNC: 2.9 G/DL (ref 1.5–4.5)
GLUCOSE SERPL-MCNC: 98 MG/DL (ref 65–99)
HDLC SERPL-MCNC: 36 MG/DL
LDLC SERPL CALC-MCNC: 96 MG/DL (ref 0–99)
MICRODELETION SYND BLD/T FISH: NORMAL
POTASSIUM SERPL-SCNC: 5.2 MMOL/L (ref 3.5–5.2)
PROT SERPL-MCNC: 7.2 G/DL (ref 6–8.5)
PSA SERPL-MCNC: 1 NG/ML (ref 0–4)
SL AMB EGFR AFRICAN AMERICAN: 109 ML/MIN/1.73
SL AMB EGFR NON AFRICAN AMERICAN: 94 ML/MIN/1.73
SODIUM SERPL-SCNC: 143 MMOL/L (ref 134–144)
TRIGL SERPL-MCNC: 111 MG/DL (ref 0–149)

## 2020-03-30 ENCOUNTER — OFFICE VISIT (OUTPATIENT)
Dept: URGENT CARE | Facility: CLINIC | Age: 60
End: 2020-03-30
Payer: COMMERCIAL

## 2020-03-30 ENCOUNTER — APPOINTMENT (OUTPATIENT)
Dept: RADIOLOGY | Facility: CLINIC | Age: 60
End: 2020-03-30
Payer: COMMERCIAL

## 2020-03-30 VITALS
HEART RATE: 102 BPM | TEMPERATURE: 99.7 F | SYSTOLIC BLOOD PRESSURE: 140 MMHG | HEIGHT: 71 IN | BODY MASS INDEX: 39.79 KG/M2 | RESPIRATION RATE: 18 BRPM | DIASTOLIC BLOOD PRESSURE: 90 MMHG | WEIGHT: 284.2 LBS | OXYGEN SATURATION: 97 %

## 2020-03-30 DIAGNOSIS — M71.21 BAKER'S CYST OF KNEE, RIGHT: Primary | ICD-10-CM

## 2020-03-30 DIAGNOSIS — M25.561 ACUTE PAIN OF RIGHT KNEE: ICD-10-CM

## 2020-03-30 PROCEDURE — 1036F TOBACCO NON-USER: CPT | Performed by: PHYSICIAN ASSISTANT

## 2020-03-30 PROCEDURE — 99214 OFFICE O/P EST MOD 30 MIN: CPT | Performed by: PHYSICIAN ASSISTANT

## 2020-03-30 PROCEDURE — 3008F BODY MASS INDEX DOCD: CPT | Performed by: PHYSICIAN ASSISTANT

## 2020-03-30 PROCEDURE — 3077F SYST BP >= 140 MM HG: CPT | Performed by: PHYSICIAN ASSISTANT

## 2020-03-30 PROCEDURE — 73562 X-RAY EXAM OF KNEE 3: CPT

## 2020-03-30 PROCEDURE — 3080F DIAST BP >= 90 MM HG: CPT | Performed by: PHYSICIAN ASSISTANT

## 2020-03-30 RX ORDER — NAPROXEN 500 MG/1
500 TABLET ORAL 2 TIMES DAILY WITH MEALS
Qty: 30 TABLET | Refills: 0 | Status: SHIPPED | OUTPATIENT
Start: 2020-03-30 | End: 2020-11-03

## 2020-03-30 NOTE — LETTER
March 30, 2020     Patient: Davon Buck   YOB: 1960   Date of Visit: 3/30/2020       To Whom It May Concern: It is my medical opinion that Felecia Brock should remain out of work until 4/2/2020  If you have any questions or concerns, please don't hesitate to call           Sincerely,        [facility] PROVIDER    CC: No Recipients

## 2020-03-30 NOTE — PATIENT INSTRUCTIONS
Bakers Cyst   WHAT YOU NEED TO KNOW:   A Bakers cyst, or popliteal cyst, is a bulging lump behind your knee  Inside the lump is a sac filled with fluid  The cyst is caused by fluid buildup in your knee joint  This can happen if you have a knee injury, such as a cartilage tear  Osteoarthritis or rheumatoid arthritis can also cause an abnormal buildup of joint fluid  DISCHARGE INSTRUCTIONS:   Return to the emergency department if:   · You have severe pain  · You have bruising on the ankle below the cyst     · Your calf turns blue below the cyst     · Your calf or knee is swollen or bleeding  Contact your healthcare provider if:   · You have a fever  · Your pain does not improve with medicine  · You have questions or concerns about your condition or care  Medicines:   · NSAIDs  help decrease swelling and pain  This medicine is available without a doctor's order  Your healthcare provider will tell you which medicine to take and how often to take it  Follow directions  NSAIDs can cause stomach bleeding or kidney problems if they are not taken correctly  · Take your medicine as directed  Contact your healthcare provider if you think your medicine is not helping or if you have side effects  Tell him of her if you are allergic to any medicine  Keep a list of the medicines, vitamins, and herbs you take  Include the amounts, and when and why you take them  Bring the list or the pill bottles to follow-up visits  Carry your medicine list with you in case of an emergency  Care for your knee:   · Rest as needed  Limit movement as your knee heals  This will help decrease the risk of more damage to your knee  You may need crutches to take weight off your injured knee  Use crutches as directed  · Ice your knee  Ice helps decrease swelling and pain  Use an ice pack, or put ice in a plastic bag  Cover the ice pack with a towel and place the ice on your knee for 15 to 20 minutes, 3 to 4 times each day   Do this for 2 to 3 days  · Support your knee  Wrap your knee with an elastic bandage  Ask your healthcare provider if you need a brace for more support  This will help decrease swelling and movement so your knee can heal     · Elevate your knee  Use pillows to raise your knee above the level of your heart as often as you can  This will help decrease swelling  · Go to physical therapy as directed  A physical therapist teaches you exercises to help improve movement and strength, and to decrease pain  Follow up with your healthcare provider as directed:  Write down your questions so you remember to ask them during your visits  © 2017 [employer]  Information is for End User's use only and may not be sold, redistributed or otherwise used for commercial purposes  All illustrations and images included in CareNotes® are the copyrighted property of [employer]  or [employer]  The above information is an  only  It is not intended as medical advice for individual conditions or treatments  Talk to your doctor, nurse or pharmacist before following any medical regimen to see if it is safe and effective for you  Acute right knee pain: ddx bakers cyst  -We discussed based on his hx and examination he may be experiencing a bakers cyst secondary to his osteoarthritis  -There is no obvious sign of dislocation or fracture on X-ray  There is osteoarthritis appreciated  Awaiting official read  -Ice the area for 20 minutes 3-4 times a day  -Ace wrap the knee for comfort and support   -Elevate the area and rest   -Will refill his Naproxen-he should take this as directed and with meals    -Avoid strenuous activity/sports or gym until your symptoms improve  -Follow up with Dr Hough For further evaluation and management  A physical therapy referral was also made for the patient

## 2020-03-30 NOTE — PROGRESS NOTES
[facility]        NAME: María Webster is a 61 y o  male  : 1960    MRN: [id number]  DATE: 2020  TIME: 4:20 PM    Assessment and Plan   Baker's cyst of knee, right [M71 21]  1  Baker's cyst of knee, right  naproxen (NAPROSYN) 500 mg tablet    Ambulatory referral to Physical Therapy   2  Acute pain of right knee  XR knee 3 vw right non injury         Patient Instructions   Acute right knee pain: ddx bakers cyst  -We discussed based on his hx and examination he may be experiencing a bakers cyst secondary to his osteoarthritis  -There is no obvious sign of dislocation or fracture on X-ray  There is osteoarthritis appreciated  Awaiting official read  -Ice the area for 20 minutes 3-4 times a day  -Ace wrap the knee for comfort and support   -Elevate the area and rest   -Will refill his Naproxen-he should take this as directed and with meals    -Avoid strenuous activity/sports or gym until your symptoms improve  -Follow up with Dr Hough For further evaluation and management  A physical therapy referral was also made for the patient  Follow up with PCP in 3-5 days  Proceed to  ER if symptoms worsen  Chief Complaint     Chief Complaint   Patient presents with    Knee Pain     Started with R knee pain since Thursday - back of knee  Area ism soft and swollen  Using ice and Tylenol  Has limp and cannot bear full weight  History of Present Illness       The patient presents today for right knee pain x four days  The patient states that he began to experience pain over the right popliteal fossa and states that there is associated edema and tenderness of the area  He has been icing the area and taking Tylenol for the pain  He states that he is unable to bear weight and has pain with ambulating, this has caused him to ambulate with a limp  He denies trauma or injury to the site  He denies numbness or tingling of the right lower extremity  He denies ecchymosis or erythema   He denies prior injury or trauma to the right lower extremity  He has been taking Tylenol for the pain with mild relief  Of note he states that he had similar pain of his left knee approximately two weeks ago and had an Xray that showed osteoarthritis  He states that his pain has since resolved  Review of Systems   Review of Systems   Constitutional: Negative for activity change, appetite change, chills, fatigue and fever  Musculoskeletal: Positive for arthralgias, gait problem and joint swelling  Negative for back pain, myalgias, neck pain and neck stiffness  Skin: Negative for color change, pallor, rash and wound  Allergic/Immunologic: Negative for immunocompromised state  Neurological: Negative for dizziness, weakness, light-headedness and numbness  Hematological: Does not bruise/bleed easily           Current Medications       Current Outpatient Medications:     fexofenadine (ALLEGRA ALLERGY) 60 MG tablet, Take 1 tablet (60 mg total) by mouth daily, Disp: 90 tablet, Rfl: 1    fluticasone (FLONASE) 50 mcg/act nasal spray, 2 sprays into each nostril daily, Disp: 3 Bottle, Rfl: 1    Glucosamine-Chondroitin-Vit D3 750-600-500 MG-MG-UNIT TABS, Take 1 tablet by mouth daily, Disp: , Rfl:     multivitamin (THERAGRAN) TABS, Take 1 tablet by mouth daily, Disp: , Rfl:     naproxen (NAPROSYN) 500 mg tablet, Take 1 tablet by mouth every 12 (twelve) hours as needed, Disp: , Rfl:     naproxen (NAPROSYN) 500 mg tablet, Take 1 tablet (500 mg total) by mouth 2 (two) times a day with meals, Disp: 30 tablet, Rfl: 0    PEG 3350-KCl-NaCl-NaSulf-Na Asc-C (MOVIPREP) 100 g, Take by mouth, Disp: , Rfl:     Current Allergies     Allergies as of 03/30/2020    (No Known Allergies)            The following portions of the patient's history were reviewed and updated as appropriate: allergies, current medications, past family history, past medical history, past social history, past surgical history and problem list  Past Medical History:   Diagnosis Date    Ankle edema 07/13/2015    Obesity     Organic impotence     Seasonal allergies        History reviewed  No pertinent surgical history  Family History   Problem Relation Age of Onset    Alzheimer's disease Mother     Cancer Mother          Medications have been verified  Objective   /90   Pulse 102   Temp 99 7 °F (37 6 °C)   Resp 18   Ht 5' 11" (1 803 m)   Wt 129 kg (284 lb 3 2 oz)   SpO2 97%   BMI 39 64 kg/m²        Physical Exam     Physical Exam   Constitutional: He appears well-developed and well-nourished  No distress  Cardiovascular: Normal rate, regular rhythm and normal heart sounds  Pulmonary/Chest: Effort normal and breath sounds normal    Musculoskeletal:        Right knee: He exhibits decreased range of motion and swelling  He exhibits no effusion, no ecchymosis, no deformity, no laceration, no erythema, normal alignment, no LCL laxity, normal patellar mobility, no bony tenderness, normal meniscus and no MCL laxity  Tenderness found  Medial joint line tenderness noted  No lateral joint line, no MCL, no LCL and no patellar tendon tenderness noted  There is tenderness over the medial joint line and popliteal fossa of the right knee  There is no erythema or ecchymosis of the area  Mild bulging/swelling over the popliteal fossa  No laxity or crepitus appreciated on exam  There is pain with flexion and extension of the knee with limited ROM with passive extension  Strength is 5/5 of the lower extremities bilaterally  Sensation intact  Patient observed ambulating with a limp due to the pain  Neurological: He has normal strength  No sensory deficit  He exhibits normal muscle tone  Gait normal    Skin: Skin is warm, dry and intact  Capillary refill takes less than 2 seconds  No bruising and no ecchymosis noted  He is not diaphoretic  No erythema  Psychiatric: He has a normal mood and affect   His behavior is normal    Nursing note and vitals reviewed

## 2020-04-29 ENCOUNTER — TELEMEDICINE (OUTPATIENT)
Dept: FAMILY MEDICINE CLINIC | Facility: CLINIC | Age: 60
End: 2020-04-29
Payer: COMMERCIAL

## 2020-04-29 DIAGNOSIS — J30.2 SEASONAL ALLERGIC RHINITIS, UNSPECIFIED TRIGGER: ICD-10-CM

## 2020-04-29 DIAGNOSIS — M17.0 BILATERAL PRIMARY OSTEOARTHRITIS OF KNEE: ICD-10-CM

## 2020-04-29 DIAGNOSIS — M25.561 ACUTE PAIN OF RIGHT KNEE: Primary | ICD-10-CM

## 2020-04-29 DIAGNOSIS — E66.9 OBESITY (BMI 30-39.9): ICD-10-CM

## 2020-04-29 PROBLEM — Z23 NEED FOR VACCINATION: Status: RESOLVED | Noted: 2020-01-29 | Resolved: 2020-04-29

## 2020-04-29 PROBLEM — R73.03 PREDIABETES: Status: RESOLVED | Noted: 2020-01-29 | Resolved: 2020-04-29

## 2020-04-29 PROCEDURE — 99213 OFFICE O/P EST LOW 20 MIN: CPT | Performed by: FAMILY MEDICINE

## 2020-05-12 ENCOUNTER — TELEPHONE (OUTPATIENT)
Dept: FAMILY MEDICINE CLINIC | Facility: CLINIC | Age: 60
End: 2020-05-12

## 2020-05-12 DIAGNOSIS — M71.22 BAKER'S CYST OF KNEE, LEFT: Primary | ICD-10-CM

## 2020-05-12 DIAGNOSIS — M71.21 BAKER'S CYST OF KNEE, RIGHT: ICD-10-CM

## 2020-05-18 DIAGNOSIS — G89.29 CHRONIC PAIN OF LEFT KNEE: Primary | ICD-10-CM

## 2020-05-18 DIAGNOSIS — M25.562 CHRONIC PAIN OF LEFT KNEE: Primary | ICD-10-CM

## 2020-05-20 ENCOUNTER — HOSPITAL ENCOUNTER (OUTPATIENT)
Dept: RADIOLOGY | Facility: HOSPITAL | Age: 60
Discharge: HOME | End: 2020-05-20
Attending: FAMILY MEDICINE
Payer: COMMERCIAL

## 2020-05-20 DIAGNOSIS — M25.561 ACUTE PAIN OF RIGHT KNEE: ICD-10-CM

## 2020-05-20 DIAGNOSIS — M17.0 BILATERAL PRIMARY OSTEOARTHRITIS OF KNEE: ICD-10-CM

## 2020-05-20 PROCEDURE — 76882 US LMTD JT/FCL EVL NVASC XTR: CPT

## 2020-05-21 PROBLEM — M25.561 ACUTE PAIN OF RIGHT KNEE: Status: RESOLVED | Noted: 2020-03-30 | Resolved: 2020-05-21

## 2020-05-21 PROBLEM — M71.22 BAKER'S CYST OF KNEE, LEFT: Status: ACTIVE | Noted: 2020-05-21

## 2020-06-02 ENCOUNTER — OFFICE VISIT (OUTPATIENT)
Dept: OBGYN CLINIC | Facility: CLINIC | Age: 60
End: 2020-06-02
Payer: COMMERCIAL

## 2020-06-02 VITALS
BODY MASS INDEX: 38.77 KG/M2 | HEART RATE: 71 BPM | WEIGHT: 278 LBS | SYSTOLIC BLOOD PRESSURE: 164 MMHG | TEMPERATURE: 97.6 F | DIASTOLIC BLOOD PRESSURE: 91 MMHG

## 2020-06-02 DIAGNOSIS — M71.22 BAKER'S CYST OF KNEE, LEFT: ICD-10-CM

## 2020-06-02 DIAGNOSIS — M17.0 BILATERAL PRIMARY OSTEOARTHRITIS OF KNEE: Primary | ICD-10-CM

## 2020-06-02 DIAGNOSIS — M71.21 BAKER'S CYST OF KNEE, RIGHT: ICD-10-CM

## 2020-06-02 PROCEDURE — 99244 OFF/OP CNSLTJ NEW/EST MOD 40: CPT | Performed by: ORTHOPAEDIC SURGERY

## 2020-06-02 PROCEDURE — 20610 DRAIN/INJ JOINT/BURSA W/O US: CPT | Performed by: ORTHOPAEDIC SURGERY

## 2020-06-02 RX ORDER — DEXAMETHASONE SODIUM PHOSPHATE 100 MG/10ML
40 INJECTION INTRAMUSCULAR; INTRAVENOUS
Status: COMPLETED | OUTPATIENT
Start: 2020-06-02 | End: 2020-06-02

## 2020-06-02 RX ORDER — LIDOCAINE HYDROCHLORIDE 10 MG/ML
4 INJECTION, SOLUTION INFILTRATION; PERINEURAL
Status: COMPLETED | OUTPATIENT
Start: 2020-06-02 | End: 2020-06-02

## 2020-06-02 RX ADMIN — DEXAMETHASONE SODIUM PHOSPHATE 40 MG: 100 INJECTION INTRAMUSCULAR; INTRAVENOUS at 11:41

## 2020-06-02 RX ADMIN — LIDOCAINE HYDROCHLORIDE 4 ML: 10 INJECTION, SOLUTION INFILTRATION; PERINEURAL at 11:41

## 2020-06-11 ENCOUNTER — TRANSCRIBE ORDERS (OUTPATIENT)
Dept: RADIOLOGY | Facility: HOSPITAL | Age: 60
End: 2020-06-11

## 2020-06-11 ENCOUNTER — HOSPITAL ENCOUNTER (OUTPATIENT)
Dept: RADIOLOGY | Facility: HOSPITAL | Age: 60
Discharge: HOME | End: 2020-06-11
Attending: ORTHOPAEDIC SURGERY | Admitting: ORTHOPAEDIC SURGERY
Payer: COMMERCIAL

## 2020-06-11 ENCOUNTER — TELEPHONE (OUTPATIENT)
Dept: OBGYN CLINIC | Facility: HOSPITAL | Age: 60
End: 2020-06-11

## 2020-06-11 DIAGNOSIS — M71.22 BAKER'S CYST OF KNEE, LEFT: ICD-10-CM

## 2020-06-11 DIAGNOSIS — M71.21 BAKER'S CYST OF KNEE, RIGHT: ICD-10-CM

## 2020-06-11 PROCEDURE — 20611 DRAIN/INJ JOINT/BURSA W/US: CPT

## 2020-06-11 RX ORDER — LIDOCAINE HYDROCHLORIDE 10 MG/ML
5 INJECTION, SOLUTION EPIDURAL; INFILTRATION; INTRACAUDAL; PERINEURAL ONCE
Status: COMPLETED | OUTPATIENT
Start: 2020-06-11 | End: 2020-06-11

## 2020-06-11 RX ADMIN — LIDOCAINE HYDROCHLORIDE 5 ML: 10 INJECTION, SOLUTION EPIDURAL; INFILTRATION; INTRACAUDAL; PERINEURAL at 10:25

## 2020-07-09 DIAGNOSIS — J30.2 SEASONAL ALLERGIC RHINITIS, UNSPECIFIED TRIGGER: ICD-10-CM

## 2020-07-09 RX ORDER — FEXOFENADINE HYDROCHLORIDE 60 MG/1
TABLET, FILM COATED ORAL
Qty: 90 TABLET | Refills: 3 | Status: SHIPPED | OUTPATIENT
Start: 2020-07-09 | End: 2021-04-17

## 2020-07-27 DIAGNOSIS — J30.2 SEASONAL ALLERGIC RHINITIS, UNSPECIFIED TRIGGER: ICD-10-CM

## 2020-07-27 RX ORDER — FLUTICASONE PROPIONATE 50 MCG
SPRAY, SUSPENSION (ML) NASAL
Qty: 48 G | Refills: 3 | Status: SHIPPED | OUTPATIENT
Start: 2020-07-27 | End: 2021-04-17

## 2020-08-05 ENCOUNTER — OFFICE VISIT (OUTPATIENT)
Dept: OBGYN CLINIC | Facility: CLINIC | Age: 60
End: 2020-08-05
Payer: COMMERCIAL

## 2020-08-05 VITALS
HEART RATE: 96 BPM | HEIGHT: 71 IN | DIASTOLIC BLOOD PRESSURE: 85 MMHG | BODY MASS INDEX: 40.99 KG/M2 | TEMPERATURE: 98.3 F | SYSTOLIC BLOOD PRESSURE: 174 MMHG | WEIGHT: 292.8 LBS

## 2020-08-05 DIAGNOSIS — M17.0 BILATERAL PRIMARY OSTEOARTHRITIS OF KNEE: Primary | ICD-10-CM

## 2020-08-05 PROCEDURE — 3008F BODY MASS INDEX DOCD: CPT | Performed by: ORTHOPAEDIC SURGERY

## 2020-08-05 PROCEDURE — 1036F TOBACCO NON-USER: CPT | Performed by: ORTHOPAEDIC SURGERY

## 2020-08-05 PROCEDURE — 20610 DRAIN/INJ JOINT/BURSA W/O US: CPT | Performed by: ORTHOPAEDIC SURGERY

## 2020-08-05 PROCEDURE — 3079F DIAST BP 80-89 MM HG: CPT | Performed by: ORTHOPAEDIC SURGERY

## 2020-08-05 PROCEDURE — 99214 OFFICE O/P EST MOD 30 MIN: CPT | Performed by: ORTHOPAEDIC SURGERY

## 2020-08-05 PROCEDURE — 3077F SYST BP >= 140 MM HG: CPT | Performed by: ORTHOPAEDIC SURGERY

## 2020-08-05 RX ORDER — DEXAMETHASONE SODIUM PHOSPHATE 100 MG/10ML
40 INJECTION INTRAMUSCULAR; INTRAVENOUS
Status: COMPLETED | OUTPATIENT
Start: 2020-08-05 | End: 2020-08-05

## 2020-08-05 RX ORDER — LIDOCAINE HYDROCHLORIDE 10 MG/ML
4 INJECTION, SOLUTION INFILTRATION; PERINEURAL
Status: COMPLETED | OUTPATIENT
Start: 2020-08-05 | End: 2020-08-05

## 2020-08-05 RX ADMIN — LIDOCAINE HYDROCHLORIDE 4 ML: 10 INJECTION, SOLUTION INFILTRATION; PERINEURAL at 16:04

## 2020-08-05 RX ADMIN — LIDOCAINE HYDROCHLORIDE 4 ML: 10 INJECTION, SOLUTION INFILTRATION; PERINEURAL at 16:05

## 2020-08-05 RX ADMIN — DEXAMETHASONE SODIUM PHOSPHATE 40 MG: 100 INJECTION INTRAMUSCULAR; INTRAVENOUS at 16:04

## 2020-08-05 RX ADMIN — DEXAMETHASONE SODIUM PHOSPHATE 40 MG: 100 INJECTION INTRAMUSCULAR; INTRAVENOUS at 16:05

## 2020-08-05 NOTE — LETTER
August 5, 2020     Patient: Silvia Guzman   YOB: 1960   Date of Visit: 8/5/2020       To Whom it May Concern:    Shannon Regan is under my professional care  He was seen in my office on 8/5/2020  He is to return back to work on 08/10/2020  If you have any questions or concerns, please don't hesitate to call           Sincerely,          Nasra Brand MD        CC: No Recipients

## 2020-08-05 NOTE — PROGRESS NOTES
Assessment/Plan:  1  Bilateral primary osteoarthritis of knee  Injection procedure prior authorization    Ambulatory referral to Orthopedic Surgery       Scribe Attestation    I,:   Beulah Zacarias am acting as a scribe while in the presence of the attending physician :        I,:   Khris Hubbard MD personally performed the services described in this documentation    as scribed in my presence :          Jennifer Mitchell did have an ultrasound-guided bilateral Baker cyst aspiration which yielded 5 mL of clear slightly yellow thick fluid from the left Baker cyst and 40 mL of clear slightly yellow thick fluid from the right Baker cyst   Unfortunately, his painful symptoms have since returned  I did provide him with the option of bilateral knee steroid injections today today  He was amendable to this and tolerated the procedure well  I did discuss with him that with obtaining steroid injections today, he is unable to have any surgical intervention for at least 3 months due to the risk of infection  He verbally stated he understood this  He did inquire about hyaluronic acid and I do believe this is reasonable  I did explain to him that the Euflexxa injections are a 3 part series where he will receive 1 injection per week for 3 weeks  I did provide him with a prescription for this today in the office and my office will call him once his insurance approves them  We did engage in a conversation about weight loss as his BMI currently is 40 84 kg/m2  He will require a knee replacement in the future and with this his BMI must be below 40  He states he has been unable to work out due to his knee pains, however is encouraged as he does wish to have surgical intervention  With that, I did provide him with a referral to 1 of my colleagues, Dr Jailyn Benavides  I will have him follow up with him in 6 weeks in hopes he finds relief with the steroid injection and begins his rounds of the Euflexxa injection      Large joint arthrocentesis: R knee  Date/Time: 8/5/2020 4:04 PM  Consent given by: patient  Site marked: site marked  Timeout: Immediately prior to procedure a time out was called to verify the correct patient, procedure, equipment, support staff and site/side marked as required   Supporting Documentation  Indications: pain   Procedure Details  Location: knee - R knee  Needle size: 22 G  Ultrasound guidance: no  Approach: anterolateral  Medications administered: 4 mL lidocaine 1 %; 40 mg dexamethasone 100 mg/10 mL    Patient tolerance: patient tolerated the procedure well with no immediate complications  Dressing:  Sterile dressing applied    Large joint arthrocentesis: L knee  Date/Time: 8/5/2020 4:05 PM  Consent given by: patient  Site marked: site marked  Timeout: Immediately prior to procedure a time out was called to verify the correct patient, procedure, equipment, support staff and site/side marked as required   Supporting Documentation  Indications: pain   Procedure Details  Location: knee - L knee  Needle size: 22 G  Ultrasound guidance: no  Approach: anterolateral  Medications administered: 4 mL lidocaine 1 %; 40 mg dexamethasone 100 mg/10 mL    Patient tolerance: patient tolerated the procedure well with no immediate complications  Dressing:  Sterile dressing applied              Subjective:   Rashmi Moya is a 61 y o  male who presents to the office today for follow-up evaluation of bilateral knee pain, right greater than left  His last visit, I provided him with a prescription for interventional Radiology to drain both of his Baker cyst   He states he had it performed 1 week after his visit and experienced immediate relief  However, he states the fluid has returned  He continues to appreciate pain globally about both his knees  He describes his pain as constant, achy, sore, sharp and moderate in intensity  He states he is unable to work the past couple days due to the discomfort    He does appreciate minimal swelling about the anterior aspect of his knees  He denies taking any pain medication  He denies any mechanical symptoms  He denies any numbness or tingling  He does note he has had a steroid injection once which did provide him with significant relief  Review of Systems   Constitutional: Negative for chills, fever and unexpected weight change  HENT: Negative for hearing loss, nosebleeds and sore throat  Eyes: Negative for pain, redness and visual disturbance  Respiratory: Negative for cough, shortness of breath and wheezing  Cardiovascular: Negative for chest pain, palpitations and leg swelling  Gastrointestinal: Negative for abdominal pain, nausea and vomiting  Endocrine: Negative for polyphagia and polyuria  Genitourinary: Negative for dysuria and hematuria  Musculoskeletal:        See HPI   Skin: Negative for rash and wound  Neurological: Negative for dizziness, numbness and headaches  Psychiatric/Behavioral: Negative for decreased concentration and suicidal ideas  The patient is not nervous/anxious            Past Medical History:   Diagnosis Date    Ankle edema 07/13/2015    Obesity     Organic impotence     Seasonal allergies        Past Surgical History:   Procedure Laterality Date    US GUIDED MSK PROCEDURE  6/11/2020       Family History   Problem Relation Age of Onset    Alzheimer's disease Mother     Cancer Mother        Social History     Occupational History    Not on file   Tobacco Use    Smoking status: Never Smoker    Smokeless tobacco: Never Used   Substance and Sexual Activity    Alcohol use: No    Drug use: Never    Sexual activity: Not on file         Current Outpatient Medications:     fexofenadine (ALLEGRA) 60 MG tablet, TAKE 1 TABLET DAILY, Disp: 90 tablet, Rfl: 3    fluticasone (FLONASE) 50 mcg/act nasal spray, USE 2 SPRAYS IN EACH NOSTRIL DAILY, Disp: 48 g, Rfl: 3    Glucosamine-Chondroitin-Vit D3 750-600-500 MG-MG-UNIT TABS, Take 1 tablet by mouth daily, Disp: , Rfl:     multivitamin (THERAGRAN) TABS, Take 1 tablet by mouth daily, Disp: , Rfl:     naproxen (NAPROSYN) 500 mg tablet, Take 1 tablet (500 mg total) by mouth 2 (two) times a day with meals, Disp: 30 tablet, Rfl: 0    PEG 3350-KCl-NaCl-NaSulf-Na Asc-C (MOVIPREP) 100 g, Take by mouth, Disp: , Rfl:     No Known Allergies    Objective:  Vitals:    08/05/20 1011   BP: (!) 174/85   Pulse: 96   Temp: 98 3 °F (36 8 °C)       Right Knee Exam     Tenderness   The patient is experiencing tenderness in the medial joint line and lateral joint line  Range of Motion   Extension: 0   Flexion: 100     Tests   Varus: negative Valgus: negative  Lachman:  Anterior - negative    Posterior - negative  Drawer:  Anterior - negative    Posterior - negative    Other   Erythema: absent  Sensation: normal  Pulse: present  Effusion: effusion (1+) present    Comments:    Palpable Baker's cyst posteriorly  Left Knee Exam     Range of Motion   Extension: 0   Flexion: 120     Tests   Varus: negative Valgus: negative  Lachman:  Anterior - negative    Posterior - negative  Drawer:  Anterior - negative     Posterior - negative    Other   Erythema: absent  Sensation: normal  Pulse: present  Effusion: effusion (trace) present    Comments:    Palpable Baker's cyst posteriorly          Observations   Left Knee   Positive for effusion (trace)  Right Knee   Positive for effusion (1+)  Physical Exam   Constitutional: He is oriented to person, place, and time  He appears well-developed  HENT:   Head: Normocephalic and atraumatic  Eyes: Pupils are equal, round, and reactive to light  Conjunctivae are normal    Neck: Normal range of motion  Neck supple  Cardiovascular: Normal rate  Pulmonary/Chest: Effort normal  No respiratory distress  Musculoskeletal:      Right knee: He exhibits effusion (1+)  Left knee: He exhibits effusion (trace)        Comments: As noted in HPI   Neurological: He is alert and oriented to person, place, and time  Skin: Skin is warm and dry  Psychiatric: His behavior is normal    Nursing note and vitals reviewed  Bilateral knee xrays are reviewed from 3/2020 demonstrating medial compartment arthritis

## 2020-08-06 ENCOUNTER — TELEPHONE (OUTPATIENT)
Dept: OBGYN CLINIC | Facility: CLINIC | Age: 60
End: 2020-08-06

## 2020-08-06 NOTE — TELEPHONE ENCOUNTER
Patient called asking for a letter for him to be out of work and possibly temporary disability since his knees are still bothering him  He is a  and is not confident that he will be able to preform his job duties  Please advise

## 2020-08-07 ENCOUNTER — TELEPHONE (OUTPATIENT)
Dept: OBGYN CLINIC | Facility: HOSPITAL | Age: 60
End: 2020-08-07

## 2020-08-07 NOTE — TELEPHONE ENCOUNTER
FYI-    Patient is calling stating that he cannot go to work because he is just in too much pain  Karthikeyan Cruz would like to get the short term disability process started and then move forward with scheduling surgery  Karthikeyan Cruz would like the short term disability to begin effective August 3, 2020 and will contact his employer to begin that process    Karthikeyan Cruz also requested that his consult with Dr Dwaine Pressley be moved up and we rescheduled his previously scheduled appointment to next Friday, 08/14/2020

## 2020-08-07 NOTE — TELEPHONE ENCOUNTER
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

## 2020-08-08 ENCOUNTER — TELEPHONE (OUTPATIENT)
Dept: OBGYN CLINIC | Facility: CLINIC | Age: 60
End: 2020-08-08

## 2020-08-08 NOTE — TELEPHONE ENCOUNTER
Patient brought in a disability form  I am a little confused  Was he out of work? Paperwork said disability began 8/3/2020 but we are having him return 8/10? I do not see anything that we have kept him out since 8/3? And is knee arthritis qualify for disability?

## 2020-08-10 NOTE — TELEPHONE ENCOUNTER
Please see tasks in chart  He is  nad wants to go on short term disability  He thinks he can not do his job right now   Dr Elise Mendoza said ok

## 2020-08-14 ENCOUNTER — OFFICE VISIT (OUTPATIENT)
Dept: OBGYN CLINIC | Facility: CLINIC | Age: 60
End: 2020-08-14
Payer: COMMERCIAL

## 2020-08-14 VITALS
BODY MASS INDEX: 39.34 KG/M2 | WEIGHT: 281 LBS | SYSTOLIC BLOOD PRESSURE: 164 MMHG | DIASTOLIC BLOOD PRESSURE: 83 MMHG | HEART RATE: 84 BPM | HEIGHT: 71 IN | TEMPERATURE: 97.3 F

## 2020-08-14 DIAGNOSIS — G89.29 CHRONIC PAIN OF BOTH KNEES: ICD-10-CM

## 2020-08-14 DIAGNOSIS — M25.561 CHRONIC PAIN OF BOTH KNEES: ICD-10-CM

## 2020-08-14 DIAGNOSIS — M17.0 BILATERAL PRIMARY OSTEOARTHRITIS OF KNEE: Primary | ICD-10-CM

## 2020-08-14 DIAGNOSIS — M25.562 CHRONIC PAIN OF BOTH KNEES: ICD-10-CM

## 2020-08-14 PROCEDURE — 3008F BODY MASS INDEX DOCD: CPT | Performed by: ORTHOPAEDIC SURGERY

## 2020-08-14 PROCEDURE — 3079F DIAST BP 80-89 MM HG: CPT | Performed by: ORTHOPAEDIC SURGERY

## 2020-08-14 PROCEDURE — 99214 OFFICE O/P EST MOD 30 MIN: CPT | Performed by: ORTHOPAEDIC SURGERY

## 2020-08-14 PROCEDURE — 1036F TOBACCO NON-USER: CPT | Performed by: ORTHOPAEDIC SURGERY

## 2020-08-14 PROCEDURE — 3077F SYST BP >= 140 MM HG: CPT | Performed by: ORTHOPAEDIC SURGERY

## 2020-08-14 NOTE — PROGRESS NOTES
Assessment/Plan:  1  Bilateral primary osteoarthritis of knee  Ambulatory referral to Orthopedic Surgery   2  Chronic pain of both knees       Scribe Attestation    I,:   Silvestre Avila am acting as a scribe while in the presence of the attending physician :        I,:   Lakshmi Rodriguez, DO personally performed the services described in this documentation    as scribed in my presence :          Inez Chavis is a very pleasant 80-year-old male who presents today for initial evaluation of his chronic bilateral knee pain  After reviewing his imaging and performing a thorough history and physical exam I explained that he is suffering with severe underlying osteoarthritis which does appear to be more advanced in his left knee than his right  Unfortunately, he has failed to find relief despite extensive conservative treatment including maintenance of appropriate weight, activity modification, bracing, use of an assistive device, home exercise program, and injection therapy  I did explain that based on his failure of conservative treatment and the advanced nature of his underlying disease, he is indicated for total knee arthroplasties which would 1st be initiated for his left knee  Unfortunately, I am unable to offer him surgery at this time due to his recent bilateral corticosteroid injections  He does understand that he would be a candidate for surgery beginning on 11/06/2020  I did commended him on his recent weight loss and encouraged him to keep his weight optimized  He plans to return to the office in early October for a surgical consult and consent for a left total knee arthroplasty  He plans to use Tylenol for symptomatic relief until that time  He did inquire about extending his short-term disability and I explained that this would need to be ordered through Dr Wong Duckworth  He will call the office if he wishes to pursue this    We will plan to see him back in early October for consent for left total knee arthroplasty  He will need new x-rays with magnification marker on arrival at that visit  Subjective: Initial evaluation for chronic bilateral knee pain    Patient ID: Arie Quiñonez is a 61 y o  male who presents today for initial evaluation of his chronic bilateral knee pain which is worse on the left than the right  He was recently evaluated for this by Dr Giorgio Ly who diagnosed him with severe osteoarthritis and informed him he would likely need a knee replacement  He also performed bilateral corticosteroid injections on 08/05/2020  for symptomatic relief  At today's visit, he describes diffuse activity related pain about both of his knees that can reach 6/10 on the pain scale  He has been working on weight loss and reports a recent weight loss of 10 lb which he has achieved with his home exercise bike  He also uses a brace for periods of activity which helps him somewhat  He reports that the recent bilateral corticosteroid injections were only effective for approximately three days  He states that he has been on short-term disability at work and is hopeful for bilateral knee replacements  He denies any acute injury or trauma  He denies any distal paresthesias of the lower extremity  Review of Systems   Constitutional: Positive for activity change  Negative for chills, fever and unexpected weight change  HENT: Negative  Negative for hearing loss, nosebleeds and sore throat  Eyes: Negative  Negative for pain, redness and visual disturbance  Respiratory: Negative  Negative for cough, shortness of breath and wheezing  Cardiovascular: Negative  Negative for chest pain, palpitations and leg swelling  Gastrointestinal: Negative  Negative for abdominal pain, nausea and vomiting  Endocrine: Negative  Negative for polyphagia and polyuria  Genitourinary: Negative for dysuria and hematuria  Musculoskeletal: Positive for arthralgias and myalgias  Negative for joint swelling  See HPI   Skin: Negative  Negative for rash and wound  Neurological: Negative  Negative for dizziness, numbness and headaches  Psychiatric/Behavioral: Negative  Negative for decreased concentration and suicidal ideas  The patient is not nervous/anxious  Past Medical History:   Diagnosis Date    Ankle edema 2015    Obesity     Organic impotence     Seasonal allergies        Past Surgical History:   Procedure Laterality Date    US GUIDED MSK PROCEDURE  2020       Family History   Problem Relation Age of Onset    Alzheimer's disease Mother     Cancer Mother        Social History     Occupational History    Not on file   Tobacco Use    Smoking status: Former Smoker     Last attempt to quit: 1970     Years since quittin 0    Smokeless tobacco: Never Used   Substance and Sexual Activity    Alcohol use: No    Drug use: Never    Sexual activity: Not on file         Current Outpatient Medications:     fexofenadine (ALLEGRA) 60 MG tablet, TAKE 1 TABLET DAILY, Disp: 90 tablet, Rfl: 3    fluticasone (FLONASE) 50 mcg/act nasal spray, USE 2 SPRAYS IN EACH NOSTRIL DAILY, Disp: 48 g, Rfl: 3    Glucosamine-Chondroitin-Vit D3 750-600-500 MG-MG-UNIT TABS, Take 1 tablet by mouth daily, Disp: , Rfl:     multivitamin (THERAGRAN) TABS, Take 1 tablet by mouth daily, Disp: , Rfl:     naproxen (NAPROSYN) 500 mg tablet, Take 1 tablet (500 mg total) by mouth 2 (two) times a day with meals, Disp: 30 tablet, Rfl: 0    PEG 3350-KCl-NaCl-NaSulf-Na Asc-C (MOVIPREP) 100 g, Take by mouth, Disp: , Rfl:     No Known Allergies    Objective:  Vitals:    20 0921   BP: 164/83   Pulse: 84   Temp: (!) 97 3 °F (36 3 °C)       Body mass index is 39 19 kg/m²  Right Knee Exam     Tenderness   The patient is experiencing tenderness in the lateral joint line, medial joint line and patella  Range of Motion   Right knee extension: 0  Right knee flexion: 110       Tests   Varus: negative Valgus: negative  Drawer:  Anterior - negative    Posterior - negative  Patellar apprehension: negative    Other   Erythema: absent  Scars: absent  Sensation: normal  Pulse: present  Swelling: none  Effusion: no effusion present    Comments:  Stable at 0, 30 and 90°  Parapatellar crepitance noted with active and passive range of motion  Patellofemoral grind:  Positive      Left Knee Exam     Tenderness   The patient is experiencing tenderness in the lateral joint line, medial joint line and patella  Range of Motion   Left knee extension: 5  Left knee flexion: 100  Tests   Varus: negative Valgus: negative  Drawer:  Anterior - negative     Posterior - negative  Patellar apprehension: negative    Other   Erythema: absent  Scars: absent  Sensation: normal  Pulse: present  Swelling: none  Effusion: no effusion present    Comments:  Stable at 5, 30 and 90°  Parapatellar crepitance noted with active and passive range of motion  Patellofemoral grind:  Positive          Observations   Left Knee   Negative for effusion  Right Knee   Negative for effusion  Physical Exam  Vitals signs and nursing note reviewed  Constitutional:       Appearance: He is well-developed  HENT:      Head: Normocephalic and atraumatic  Eyes:      Conjunctiva/sclera: Conjunctivae normal       Pupils: Pupils are equal, round, and reactive to light  Neck:      Musculoskeletal: Normal range of motion and neck supple  Cardiovascular:      Rate and Rhythm: Normal rate  Pulmonary:      Effort: Pulmonary effort is normal  No respiratory distress  Abdominal:      Palpations: Abdomen is soft  Musculoskeletal:      Right knee: He exhibits no effusion  Left knee: He exhibits no effusion  Comments: As noted in HPI   Skin:     General: Skin is warm and dry  Neurological:      Mental Status: He is alert and oriented to person, place, and time     Psychiatric:         Behavior: Behavior normal          I have personally reviewed pertinent films in PACS  X-ray of the left knee obtained on 03/13/2020 reviewed demonstrating severe degenerative changes evidence by joint space narrowing most prominent in the lateral compartment, sclerosis, marginal osteophytosis, and tibial subluxation  There is no acute fracture, dislocation, lytic or blastic lesion

## 2020-09-09 ENCOUNTER — TELEPHONE (OUTPATIENT)
Dept: OBGYN CLINIC | Facility: CLINIC | Age: 60
End: 2020-09-09

## 2020-09-11 ENCOUNTER — OFFICE VISIT (OUTPATIENT)
Dept: FAMILY MEDICINE CLINIC | Facility: CLINIC | Age: 60
End: 2020-09-11
Payer: COMMERCIAL

## 2020-09-11 VITALS
WEIGHT: 281 LBS | RESPIRATION RATE: 16 BRPM | TEMPERATURE: 98.5 F | HEIGHT: 71 IN | BODY MASS INDEX: 39.34 KG/M2 | DIASTOLIC BLOOD PRESSURE: 86 MMHG | HEART RATE: 84 BPM | SYSTOLIC BLOOD PRESSURE: 124 MMHG

## 2020-09-11 DIAGNOSIS — M17.0 BILATERAL PRIMARY OSTEOARTHRITIS OF KNEE: Primary | ICD-10-CM

## 2020-09-11 DIAGNOSIS — E66.9 OBESITY (BMI 30-39.9): ICD-10-CM

## 2020-09-11 DIAGNOSIS — Z23 IMMUNIZATION DUE: ICD-10-CM

## 2020-09-11 DIAGNOSIS — J30.2 SEASONAL ALLERGIC RHINITIS, UNSPECIFIED TRIGGER: ICD-10-CM

## 2020-09-11 PROCEDURE — 3079F DIAST BP 80-89 MM HG: CPT | Performed by: FAMILY MEDICINE

## 2020-09-11 PROCEDURE — 90471 IMMUNIZATION ADMIN: CPT

## 2020-09-11 PROCEDURE — 3074F SYST BP LT 130 MM HG: CPT | Performed by: FAMILY MEDICINE

## 2020-09-11 PROCEDURE — 99214 OFFICE O/P EST MOD 30 MIN: CPT | Performed by: FAMILY MEDICINE

## 2020-09-11 PROCEDURE — 90682 RIV4 VACC RECOMBINANT DNA IM: CPT

## 2020-09-11 NOTE — PROGRESS NOTES
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 and there is no limited duty available  Though I informed him of our disability policy, an exception was made to ATTEMPT to do disability form (TDI) online  He agreed to have the TDI extended until 11/15/20 or sooner if date of surgery is earlier than that, subsequently needs to be done by his surgeon  AR stable  Obesity unchanged     Diagnoses and all orders for this visit:    Bilateral primary osteoarthritis of knee    Seasonal allergic rhinitis, unspecified trigger    Immunization due  -     influenza vaccine, quadrivalent, recombinant, PF, 0 5 mL, for patients 18 yr+ (FLUBLOK)    Obesity (BMI 30-39  9)        Return if symptoms worsen or fail to improve  Subjective:      Patient ID: Pamela Becker is a 61 y o  male  Chief Complaint   Patient presents with    Knee Pain     [facility] lpn       HPI  Seeing Dr Maine Cummings b/l TKR in future, not necessarily same day  Sometime after Nov 5th, no definite date yet  Cortisone shot b/l on 8/5/20    Was out until 9/6/20 already, was out a month before that  Done by Dr Wong Duckworth initially    [place] [address] he was unable to work when he went back  B/l bakers cyst s/p aspiration      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  No modified duty allowed per pt    He states ortho group deferred disability extension to PCP    The following portions of the patient's history were reviewed and updated as appropriate: allergies, current medications, past family history, past medical history, past social history, past surgical history and problem list     Review of Systems   Constitutional: Negative for fever  Musculoskeletal: Positive for arthralgias           Current Outpatient Medications   Medication Sig Dispense Refill    fexofenadine (ALLEGRA) 60 MG tablet TAKE 1 TABLET DAILY 90 tablet 3    fluticasone (FLONASE) 50 mcg/act nasal spray USE 2 SPRAYS IN EACH NOSTRIL DAILY 48 g 3    Glucosamine-Chondroitin-Vit D3 750-600-500 MG-MG-UNIT TABS Take 1 tablet by mouth daily      multivitamin (THERAGRAN) TABS Take 1 tablet by mouth daily      naproxen (NAPROSYN) 500 mg tablet Take 1 tablet (500 mg total) by mouth 2 (two) times a day with meals 30 tablet 0    PEG 3350-KCl-NaCl-NaSulf-Na Asc-C (MOVIPREP) 100 g Take by mouth       No current facility-administered medications for this visit  Objective:    /86   Pulse 84   Temp 98 5 °F (36 9 °C)   Resp 16   Ht 5' 11" (1 803 m)   Wt 127 kg (281 lb)   BMI 39 19 kg/m²        Physical Exam  Vitals signs and nursing note reviewed  Constitutional:       Appearance: Normal appearance  He is well-developed  He is obese  He is not ill-appearing  HENT:      Head: Normocephalic  Right Ear: Tympanic membrane normal       Left Ear: Tympanic membrane normal       Nose: No rhinorrhea  Eyes:      General: No scleral icterus  Conjunctiva/sclera: Conjunctivae normal    Neck:      Musculoskeletal: Neck supple  No neck rigidity  Cardiovascular:      Rate and Rhythm: Normal rate and regular rhythm  Heart sounds: No murmur  Pulmonary:      Effort: Pulmonary effort is normal  No respiratory distress  Breath sounds: No wheezing  Abdominal:      Palpations: Abdomen is soft  Tenderness: There is no abdominal tenderness  Musculoskeletal:         General: Tenderness present  No deformity  Comments: B/l knee pain with passive rom and walking   Skin:     General: Skin is warm and dry  Neurological:      Mental Status: He is alert  Gait: Gait abnormal    Psychiatric:         Behavior: Behavior normal          Thought Content:  Thought content normal                  Sirena Olivares DO

## 2020-09-14 ENCOUNTER — TELEPHONE (OUTPATIENT)
Dept: FAMILY MEDICINE CLINIC | Facility: CLINIC | Age: 60
End: 2020-09-14

## 2020-09-14 NOTE — TELEPHONE ENCOUNTER
Pt is stating he dropped off FMLA forms to be filled out on Friday at his appt  Pt is wondering if the forms have been completed since his employer is pressuring him Pls advise and call pt back   He needs some advice on what to do

## 2020-09-14 NOTE — TELEPHONE ENCOUNTER
It was actually an extension of his Temporary Disability Insurance (which I reluctantly agreed to do)  Please let him know it was submitted online the same day I saw him, as we agreed upon

## 2020-10-09 ENCOUNTER — APPOINTMENT (OUTPATIENT)
Dept: RADIOLOGY | Facility: CLINIC | Age: 60
End: 2020-10-09
Payer: COMMERCIAL

## 2020-10-09 ENCOUNTER — HOSPITAL ENCOUNTER (OUTPATIENT)
Facility: HOSPITAL | Age: 60
Setting detail: SURGERY ADMIT
End: 2020-10-09
Attending: ORTHOPAEDIC SURGERY | Admitting: ORTHOPAEDIC SURGERY
Payer: COMMERCIAL

## 2020-10-09 ENCOUNTER — OFFICE VISIT (OUTPATIENT)
Dept: OBGYN CLINIC | Facility: CLINIC | Age: 60
End: 2020-10-09
Payer: COMMERCIAL

## 2020-10-09 VITALS
SYSTOLIC BLOOD PRESSURE: 120 MMHG | HEART RATE: 87 BPM | BODY MASS INDEX: 36.68 KG/M2 | WEIGHT: 262 LBS | DIASTOLIC BLOOD PRESSURE: 78 MMHG | HEIGHT: 71 IN

## 2020-10-09 DIAGNOSIS — M25.562 CHRONIC PAIN OF BOTH KNEES: ICD-10-CM

## 2020-10-09 DIAGNOSIS — M17.0 BILATERAL PRIMARY OSTEOARTHRITIS OF KNEE: Primary | ICD-10-CM

## 2020-10-09 DIAGNOSIS — M25.561 CHRONIC PAIN OF BOTH KNEES: ICD-10-CM

## 2020-10-09 DIAGNOSIS — G89.29 CHRONIC PAIN OF BOTH KNEES: ICD-10-CM

## 2020-10-09 DIAGNOSIS — Z01.818 PRE-OP EXAM: ICD-10-CM

## 2020-10-09 DIAGNOSIS — M17.0 BILATERAL PRIMARY OSTEOARTHRITIS OF KNEE: ICD-10-CM

## 2020-10-09 PROCEDURE — 73562 X-RAY EXAM OF KNEE 3: CPT

## 2020-10-09 PROCEDURE — 99214 OFFICE O/P EST MOD 30 MIN: CPT | Performed by: ORTHOPAEDIC SURGERY

## 2020-10-09 PROCEDURE — 1036F TOBACCO NON-USER: CPT | Performed by: ORTHOPAEDIC SURGERY

## 2020-10-09 RX ORDER — FERROUS SULFATE TAB EC 324 MG (65 MG FE EQUIVALENT) 324 (65 FE) MG
324 TABLET DELAYED RESPONSE ORAL
Qty: 30 TABLET | Refills: 0 | Status: SHIPPED | OUTPATIENT
Start: 2020-10-09 | End: 2021-01-07 | Stop reason: HOSPADM

## 2020-10-09 RX ORDER — FOLIC ACID 1 MG/1
1 TABLET ORAL DAILY
Qty: 30 TABLET | Refills: 0 | Status: SHIPPED | OUTPATIENT
Start: 2020-10-09 | End: 2021-01-07 | Stop reason: HOSPADM

## 2020-10-09 RX ORDER — ASCORBIC ACID 500 MG
500 TABLET ORAL 2 TIMES DAILY
Qty: 60 TABLET | Refills: 0 | Status: SHIPPED | OUTPATIENT
Start: 2020-10-09 | End: 2021-01-07 | Stop reason: HOSPADM

## 2020-10-09 RX ORDER — ZINC SULFATE 50(220)MG
220 CAPSULE ORAL DAILY
Qty: 30 CAPSULE | Refills: 0 | Status: SHIPPED | OUTPATIENT
Start: 2020-10-09 | End: 2021-01-07 | Stop reason: HOSPADM

## 2020-10-09 RX ORDER — CHLORHEXIDINE GLUCONATE 0.12 MG/ML
15 RINSE ORAL ONCE
Status: CANCELLED | OUTPATIENT
Start: 2020-10-09 | End: 2020-10-09

## 2020-10-09 RX ORDER — MELATONIN
1000 DAILY
Qty: 30 TABLET | Refills: 0 | Status: SHIPPED | OUTPATIENT
Start: 2020-10-09 | End: 2021-01-07 | Stop reason: HOSPADM

## 2020-10-20 ENCOUNTER — TELEPHONE (OUTPATIENT)
Dept: OBGYN CLINIC | Facility: HOSPITAL | Age: 60
End: 2020-10-20

## 2020-10-22 ENCOUNTER — TELEPHONE (OUTPATIENT)
Dept: OBGYN CLINIC | Facility: HOSPITAL | Age: 60
End: 2020-10-22

## 2020-10-23 ENCOUNTER — TELEPHONE (OUTPATIENT)
Dept: OBGYN CLINIC | Facility: HOSPITAL | Age: 60
End: 2020-10-23

## 2020-10-26 ENCOUNTER — TELEPHONE (OUTPATIENT)
Dept: OBGYN CLINIC | Facility: HOSPITAL | Age: 60
End: 2020-10-26

## 2020-10-28 ENCOUNTER — TELEPHONE (OUTPATIENT)
Dept: OBGYN CLINIC | Facility: HOSPITAL | Age: 60
End: 2020-10-28

## 2020-10-29 ENCOUNTER — TELEPHONE (OUTPATIENT)
Dept: OBGYN CLINIC | Facility: CLINIC | Age: 60
End: 2020-10-29

## 2020-10-30 ENCOUNTER — TRANSCRIBE ORDERS (OUTPATIENT)
Dept: ADMINISTRATIVE | Facility: HOSPITAL | Age: 60
End: 2020-10-30

## 2020-10-30 ENCOUNTER — APPOINTMENT (OUTPATIENT)
Dept: LAB | Facility: HOSPITAL | Age: 60
End: 2020-10-30
Attending: ORTHOPAEDIC SURGERY
Payer: COMMERCIAL

## 2020-10-30 ENCOUNTER — HOSPITAL ENCOUNTER (OUTPATIENT)
Dept: RADIOLOGY | Facility: HOSPITAL | Age: 60
Discharge: HOME | End: 2020-10-30
Attending: ORTHOPAEDIC SURGERY
Payer: COMMERCIAL

## 2020-10-30 DIAGNOSIS — M17.0 BILATERAL PRIMARY OSTEOARTHRITIS OF KNEE: ICD-10-CM

## 2020-10-30 DIAGNOSIS — M25.562 CHRONIC PAIN OF BOTH KNEES: ICD-10-CM

## 2020-10-30 DIAGNOSIS — M25.561 CHRONIC PAIN OF BOTH KNEES: ICD-10-CM

## 2020-10-30 DIAGNOSIS — G89.29 CHRONIC PAIN OF BOTH KNEES: ICD-10-CM

## 2020-10-30 LAB
ABO GROUP BLD: NORMAL
ALBUMIN SERPL BCP-MCNC: 3.6 G/DL (ref 3.5–5)
ALP SERPL-CCNC: 76 U/L (ref 46–116)
ALT SERPL W P-5'-P-CCNC: 31 U/L (ref 12–78)
ANION GAP SERPL CALCULATED.3IONS-SCNC: 6 MMOL/L (ref 4–13)
APTT PPP: 29 SECONDS (ref 23–37)
AST SERPL W P-5'-P-CCNC: 18 U/L (ref 5–45)
BASOPHILS # BLD AUTO: 0.08 THOUSANDS/ΜL (ref 0–0.1)
BASOPHILS NFR BLD AUTO: 1 % (ref 0–1)
BILIRUB SERPL-MCNC: 0.9 MG/DL (ref 0.2–1)
BILIRUB UR QL STRIP: NEGATIVE
BLD GP AB SCN SERPL QL: NEGATIVE
BUN SERPL-MCNC: 11 MG/DL (ref 5–25)
CALCIUM SERPL-MCNC: 8.9 MG/DL (ref 8.3–10.1)
CHLORIDE SERPL-SCNC: 105 MMOL/L (ref 100–108)
CLARITY UR: CLEAR
CO2 SERPL-SCNC: 29 MMOL/L (ref 21–32)
COLOR UR: YELLOW
CREAT SERPL-MCNC: 0.99 MG/DL (ref 0.6–1.3)
EOSINOPHIL # BLD AUTO: 0.08 THOUSAND/ΜL (ref 0–0.61)
EOSINOPHIL NFR BLD AUTO: 1 % (ref 0–6)
ERYTHROCYTE [DISTWIDTH] IN BLOOD BY AUTOMATED COUNT: 12.2 % (ref 11.6–15.1)
EST. AVERAGE GLUCOSE BLD GHB EST-MCNC: 103 MG/DL
GFR SERPL CREATININE-BSD FRML MDRD: 82 ML/MIN/1.73SQ M
GLUCOSE P FAST SERPL-MCNC: 91 MG/DL (ref 65–99)
GLUCOSE UR STRIP-MCNC: NEGATIVE MG/DL
HBA1C MFR BLD: 5.2 %
HCT VFR BLD AUTO: 49.5 % (ref 36.5–49.3)
HGB BLD-MCNC: 16.3 G/DL (ref 12–17)
HGB UR QL STRIP.AUTO: NEGATIVE
IMM GRANULOCYTES # BLD AUTO: 0.15 THOUSAND/UL (ref 0–0.2)
IMM GRANULOCYTES NFR BLD AUTO: 1 % (ref 0–2)
INR PPP: 1.05 (ref 0.84–1.19)
KETONES UR STRIP-MCNC: NEGATIVE MG/DL
LEUKOCYTE ESTERASE UR QL STRIP: NEGATIVE
LYMPHOCYTES # BLD AUTO: 2.42 THOUSANDS/ΜL (ref 0.6–4.47)
LYMPHOCYTES NFR BLD AUTO: 22 % (ref 14–44)
MCH RBC QN AUTO: 29.2 PG (ref 26.8–34.3)
MCHC RBC AUTO-ENTMCNC: 32.9 G/DL (ref 31.4–37.4)
MCV RBC AUTO: 89 FL (ref 82–98)
MONOCYTES # BLD AUTO: 1.01 THOUSAND/ΜL (ref 0.17–1.22)
MONOCYTES NFR BLD AUTO: 9 % (ref 4–12)
NEUTROPHILS # BLD AUTO: 7.05 THOUSANDS/ΜL (ref 1.85–7.62)
NEUTS SEG NFR BLD AUTO: 66 % (ref 43–75)
NITRITE UR QL STRIP: NEGATIVE
NRBC BLD AUTO-RTO: 0 /100 WBCS
PH UR STRIP.AUTO: 6 [PH]
PLATELET # BLD AUTO: 332 THOUSANDS/UL (ref 149–390)
PMV BLD AUTO: 9.4 FL (ref 8.9–12.7)
POTASSIUM SERPL-SCNC: 4.1 MMOL/L (ref 3.5–5.3)
PROT SERPL-MCNC: 7.4 G/DL (ref 6.4–8.2)
PROT UR STRIP-MCNC: NEGATIVE MG/DL
PROTHROMBIN TIME: 13.6 SECONDS (ref 11.6–14.5)
RBC # BLD AUTO: 5.58 MILLION/UL (ref 3.88–5.62)
RH BLD: POSITIVE
SODIUM SERPL-SCNC: 140 MMOL/L (ref 136–145)
SP GR UR STRIP.AUTO: 1.02 (ref 1–1.03)
SPECIMEN EXPIRATION DATE: NORMAL
UROBILINOGEN UR QL STRIP.AUTO: 0.2 E.U./DL
WBC # BLD AUTO: 10.79 THOUSAND/UL (ref 4.31–10.16)

## 2020-10-30 PROCEDURE — 86850 RBC ANTIBODY SCREEN: CPT

## 2020-10-30 PROCEDURE — 93005 ELECTROCARDIOGRAM TRACING: CPT

## 2020-10-30 PROCEDURE — 87081 CULTURE SCREEN ONLY: CPT

## 2020-10-30 PROCEDURE — 86901 BLOOD TYPING SEROLOGIC RH(D): CPT

## 2020-10-30 PROCEDURE — 81003 URINALYSIS AUTO W/O SCOPE: CPT | Performed by: ORTHOPAEDIC SURGERY

## 2020-10-30 PROCEDURE — 85025 COMPLETE CBC W/AUTO DIFF WBC: CPT

## 2020-10-30 PROCEDURE — 85730 THROMBOPLASTIN TIME PARTIAL: CPT

## 2020-10-30 PROCEDURE — 85610 PROTHROMBIN TIME: CPT

## 2020-10-30 PROCEDURE — 80053 COMPREHEN METABOLIC PANEL: CPT

## 2020-10-30 PROCEDURE — 71046 X-RAY EXAM CHEST 2 VIEWS: CPT

## 2020-10-30 PROCEDURE — 83036 HEMOGLOBIN GLYCOSYLATED A1C: CPT

## 2020-10-30 PROCEDURE — 86900 BLOOD TYPING SEROLOGIC ABO: CPT

## 2020-10-30 PROCEDURE — 36415 COLL VENOUS BLD VENIPUNCTURE: CPT

## 2020-10-31 LAB
ATRIAL RATE: 77 BPM
MRSA NOSE QL CULT: NORMAL
P AXIS: 20 DEGREES
PR INTERVAL: 162 MS
QRS AXIS: -11 DEGREES
QRSD INTERVAL: 86 MS
QT INTERVAL: 370 MS
QTC INTERVAL: 418 MS
T WAVE AXIS: 20 DEGREES
VENTRICULAR RATE: 77 BPM

## 2020-10-31 PROCEDURE — 93010 ELECTROCARDIOGRAM REPORT: CPT | Performed by: INTERNAL MEDICINE

## 2020-11-02 ENCOUNTER — TELEPHONE (OUTPATIENT)
Dept: OBGYN CLINIC | Facility: HOSPITAL | Age: 60
End: 2020-11-02

## 2020-11-03 ENCOUNTER — OFFICE VISIT (OUTPATIENT)
Dept: FAMILY MEDICINE CLINIC | Facility: CLINIC | Age: 60
End: 2020-11-03
Payer: COMMERCIAL

## 2020-11-03 VITALS
HEART RATE: 76 BPM | BODY MASS INDEX: 36.82 KG/M2 | RESPIRATION RATE: 16 BRPM | WEIGHT: 263 LBS | SYSTOLIC BLOOD PRESSURE: 132 MMHG | DIASTOLIC BLOOD PRESSURE: 84 MMHG | HEIGHT: 71 IN | TEMPERATURE: 97.6 F

## 2020-11-03 DIAGNOSIS — M17.0 BILATERAL PRIMARY OSTEOARTHRITIS OF KNEE: Primary | ICD-10-CM

## 2020-11-03 DIAGNOSIS — E66.9 OBESITY (BMI 30-39.9): ICD-10-CM

## 2020-11-03 DIAGNOSIS — H61.23 BILATERAL IMPACTED CERUMEN: ICD-10-CM

## 2020-11-03 PROCEDURE — 3008F BODY MASS INDEX DOCD: CPT | Performed by: FAMILY MEDICINE

## 2020-11-03 PROCEDURE — 99243 OFF/OP CNSLTJ NEW/EST LOW 30: CPT | Performed by: FAMILY MEDICINE

## 2020-11-03 PROCEDURE — 69210 REMOVE IMPACTED EAR WAX UNI: CPT | Performed by: FAMILY MEDICINE

## 2020-11-03 PROCEDURE — 1036F TOBACCO NON-USER: CPT | Performed by: FAMILY MEDICINE

## 2020-11-04 ENCOUNTER — TELEPHONE (OUTPATIENT)
Dept: OBGYN CLINIC | Facility: CLINIC | Age: 60
End: 2020-11-04

## 2020-11-04 ENCOUNTER — EVALUATION (OUTPATIENT)
Dept: PHYSICAL THERAPY | Facility: CLINIC | Age: 60
End: 2020-11-04
Payer: COMMERCIAL

## 2020-11-04 DIAGNOSIS — M25.561 CHRONIC PAIN OF BOTH KNEES: ICD-10-CM

## 2020-11-04 DIAGNOSIS — G89.29 CHRONIC PAIN OF BOTH KNEES: ICD-10-CM

## 2020-11-04 DIAGNOSIS — M25.562 CHRONIC PAIN OF BOTH KNEES: ICD-10-CM

## 2020-11-04 DIAGNOSIS — Z01.818 PRE-OP EXAM: ICD-10-CM

## 2020-11-04 DIAGNOSIS — M17.0 BILATERAL PRIMARY OSTEOARTHRITIS OF KNEE: ICD-10-CM

## 2020-11-04 PROBLEM — H61.23 BILATERAL IMPACTED CERUMEN: Status: ACTIVE | Noted: 2020-11-04

## 2020-11-04 PROCEDURE — U0003 INFECTIOUS AGENT DETECTION BY NUCLEIC ACID (DNA OR RNA); SEVERE ACUTE RESPIRATORY SYNDROME CORONAVIRUS 2 (SARS-COV-2) (CORONAVIRUS DISEASE [COVID-19]), AMPLIFIED PROBE TECHNIQUE, MAKING USE OF HIGH THROUGHPUT TECHNOLOGIES AS DESCRIBED BY CMS-2020-01-R: HCPCS | Performed by: ORTHOPAEDIC SURGERY

## 2020-11-04 PROCEDURE — 97161 PT EVAL LOW COMPLEX 20 MIN: CPT

## 2020-11-06 ENCOUNTER — TELEPHONE (OUTPATIENT)
Dept: FAMILY MEDICINE CLINIC | Facility: CLINIC | Age: 60
End: 2020-11-06

## 2020-11-06 ENCOUNTER — TELEPHONE (OUTPATIENT)
Dept: OBGYN CLINIC | Facility: HOSPITAL | Age: 60
End: 2020-11-06

## 2020-11-06 ENCOUNTER — TELEPHONE (OUTPATIENT)
Dept: OBGYN CLINIC | Facility: CLINIC | Age: 60
End: 2020-11-06

## 2020-11-06 VITALS — WEIGHT: 263 LBS | HEIGHT: 71 IN | BODY MASS INDEX: 36.82 KG/M2

## 2020-11-06 DIAGNOSIS — Z01.818 PREOPERATIVE CLEARANCE: Primary | ICD-10-CM

## 2020-11-06 LAB — SARS-COV-2 RNA SPEC QL NAA+PROBE: DETECTED

## 2020-11-13 ENCOUNTER — APPOINTMENT (OUTPATIENT)
Dept: PHYSICAL THERAPY | Facility: CLINIC | Age: 60
End: 2020-11-13
Payer: COMMERCIAL

## 2020-11-15 DIAGNOSIS — Z01.818 PREOPERATIVE CLEARANCE: ICD-10-CM

## 2020-11-15 PROCEDURE — U0003 INFECTIOUS AGENT DETECTION BY NUCLEIC ACID (DNA OR RNA); SEVERE ACUTE RESPIRATORY SYNDROME CORONAVIRUS 2 (SARS-COV-2) (CORONAVIRUS DISEASE [COVID-19]), AMPLIFIED PROBE TECHNIQUE, MAKING USE OF HIGH THROUGHPUT TECHNOLOGIES AS DESCRIBED BY CMS-2020-01-R: HCPCS | Performed by: FAMILY MEDICINE

## 2020-11-16 ENCOUNTER — APPOINTMENT (OUTPATIENT)
Dept: PHYSICAL THERAPY | Facility: CLINIC | Age: 60
End: 2020-11-16
Payer: COMMERCIAL

## 2020-11-18 ENCOUNTER — TELEPHONE (OUTPATIENT)
Dept: FAMILY MEDICINE CLINIC | Facility: CLINIC | Age: 60
End: 2020-11-18

## 2020-11-18 ENCOUNTER — APPOINTMENT (OUTPATIENT)
Dept: PHYSICAL THERAPY | Facility: CLINIC | Age: 60
End: 2020-11-18
Payer: COMMERCIAL

## 2020-11-18 LAB — SARS-COV-2 RNA SPEC QL NAA+PROBE: DETECTED

## 2020-11-19 DIAGNOSIS — Z11.9 ENCOUNTER FOR SCREENING FOR INFECTIOUS AND PARASITIC DISEASES, UNSPECIFIED: Primary | ICD-10-CM

## 2020-11-20 ENCOUNTER — APPOINTMENT (OUTPATIENT)
Dept: PHYSICAL THERAPY | Facility: CLINIC | Age: 60
End: 2020-11-20
Payer: COMMERCIAL

## 2020-11-23 ENCOUNTER — APPOINTMENT (OUTPATIENT)
Dept: PHYSICAL THERAPY | Facility: CLINIC | Age: 60
End: 2020-11-23
Payer: COMMERCIAL

## 2020-11-25 ENCOUNTER — APPOINTMENT (OUTPATIENT)
Dept: PHYSICAL THERAPY | Facility: CLINIC | Age: 60
End: 2020-11-25
Payer: COMMERCIAL

## 2020-11-25 DIAGNOSIS — Z11.9 ENCOUNTER FOR SCREENING FOR INFECTIOUS AND PARASITIC DISEASES, UNSPECIFIED: ICD-10-CM

## 2020-11-25 PROCEDURE — U0003 INFECTIOUS AGENT DETECTION BY NUCLEIC ACID (DNA OR RNA); SEVERE ACUTE RESPIRATORY SYNDROME CORONAVIRUS 2 (SARS-COV-2) (CORONAVIRUS DISEASE [COVID-19]), AMPLIFIED PROBE TECHNIQUE, MAKING USE OF HIGH THROUGHPUT TECHNOLOGIES AS DESCRIBED BY CMS-2020-01-R: HCPCS | Performed by: FAMILY MEDICINE

## 2020-11-27 ENCOUNTER — APPOINTMENT (OUTPATIENT)
Dept: PHYSICAL THERAPY | Facility: CLINIC | Age: 60
End: 2020-11-27
Payer: COMMERCIAL

## 2020-11-28 LAB — SARS-COV-2 RNA SPEC QL NAA+PROBE: NOT DETECTED

## 2020-12-02 ENCOUNTER — TELEPHONE (OUTPATIENT)
Dept: FAMILY MEDICINE CLINIC | Facility: CLINIC | Age: 60
End: 2020-12-02

## 2020-12-03 ENCOUNTER — TELEPHONE (OUTPATIENT)
Dept: OBGYN CLINIC | Facility: HOSPITAL | Age: 60
End: 2020-12-03

## 2020-12-07 ENCOUNTER — TELEPHONE (OUTPATIENT)
Dept: FAMILY MEDICINE CLINIC | Facility: CLINIC | Age: 60
End: 2020-12-07

## 2020-12-15 ENCOUNTER — TELEPHONE (OUTPATIENT)
Dept: OBGYN CLINIC | Facility: HOSPITAL | Age: 60
End: 2020-12-15

## 2020-12-18 ENCOUNTER — TELEPHONE (OUTPATIENT)
Dept: FAMILY MEDICINE CLINIC | Facility: CLINIC | Age: 60
End: 2020-12-18

## 2020-12-21 ENCOUNTER — TELEPHONE (OUTPATIENT)
Dept: OBGYN CLINIC | Facility: HOSPITAL | Age: 60
End: 2020-12-21

## 2020-12-24 ENCOUNTER — TRANSCRIBE ORDERS (OUTPATIENT)
Dept: ADMINISTRATIVE | Facility: HOSPITAL | Age: 60
End: 2020-12-24

## 2020-12-24 ENCOUNTER — LAB (OUTPATIENT)
Dept: LAB | Facility: HOSPITAL | Age: 60
DRG: 470 | End: 2020-12-24
Attending: ORTHOPAEDIC SURGERY
Payer: COMMERCIAL

## 2020-12-24 DIAGNOSIS — M25.561 CHRONIC PAIN OF BOTH KNEES: ICD-10-CM

## 2020-12-24 DIAGNOSIS — M17.0 BILATERAL PRIMARY OSTEOARTHRITIS OF KNEE: ICD-10-CM

## 2020-12-24 DIAGNOSIS — G89.29 CHRONIC PAIN OF BOTH KNEES: ICD-10-CM

## 2020-12-24 DIAGNOSIS — M25.561 CHRONIC PAIN OF RIGHT KNEE: ICD-10-CM

## 2020-12-24 DIAGNOSIS — G89.29 CHRONIC PAIN OF RIGHT KNEE: ICD-10-CM

## 2020-12-24 DIAGNOSIS — M25.562 CHRONIC PAIN OF BOTH KNEES: ICD-10-CM

## 2020-12-24 DIAGNOSIS — M17.11 PRIMARY OSTEOARTHRITIS OF RIGHT KNEE: ICD-10-CM

## 2020-12-24 LAB
ABO GROUP BLD: NORMAL
ALBUMIN SERPL BCP-MCNC: 3.7 G/DL (ref 3.5–5)
ALP SERPL-CCNC: 79 U/L (ref 46–116)
ALT SERPL W P-5'-P-CCNC: 31 U/L (ref 12–78)
ANION GAP SERPL CALCULATED.3IONS-SCNC: 7 MMOL/L (ref 4–13)
APTT PPP: 28 SECONDS (ref 23–37)
AST SERPL W P-5'-P-CCNC: 18 U/L (ref 5–45)
BASOPHILS # BLD AUTO: 0.09 THOUSANDS/ΜL (ref 0–0.1)
BASOPHILS NFR BLD AUTO: 1 % (ref 0–1)
BILIRUB SERPL-MCNC: 1 MG/DL (ref 0.2–1)
BLD GP AB SCN SERPL QL: NEGATIVE
BUN SERPL-MCNC: 12 MG/DL (ref 5–25)
CALCIUM SERPL-MCNC: 9 MG/DL (ref 8.3–10.1)
CHLORIDE SERPL-SCNC: 105 MMOL/L (ref 100–108)
CO2 SERPL-SCNC: 28 MMOL/L (ref 21–32)
CREAT SERPL-MCNC: 0.88 MG/DL (ref 0.6–1.3)
EOSINOPHIL # BLD AUTO: 0.02 THOUSAND/ΜL (ref 0–0.61)
EOSINOPHIL NFR BLD AUTO: 0 % (ref 0–6)
ERYTHROCYTE [DISTWIDTH] IN BLOOD BY AUTOMATED COUNT: 12.9 % (ref 11.6–15.1)
GFR SERPL CREATININE-BSD FRML MDRD: 93 ML/MIN/1.73SQ M
GLUCOSE P FAST SERPL-MCNC: 96 MG/DL (ref 65–99)
HCT VFR BLD AUTO: 49.2 % (ref 36.5–49.3)
HGB BLD-MCNC: 16 G/DL (ref 12–17)
IMM GRANULOCYTES # BLD AUTO: 0.14 THOUSAND/UL (ref 0–0.2)
IMM GRANULOCYTES NFR BLD AUTO: 1 % (ref 0–2)
LYMPHOCYTES # BLD AUTO: 2.85 THOUSANDS/ΜL (ref 0.6–4.47)
LYMPHOCYTES NFR BLD AUTO: 19 % (ref 14–44)
MCH RBC QN AUTO: 29 PG (ref 26.8–34.3)
MCHC RBC AUTO-ENTMCNC: 32.5 G/DL (ref 31.4–37.4)
MCV RBC AUTO: 89 FL (ref 82–98)
MONOCYTES # BLD AUTO: 1.41 THOUSAND/ΜL (ref 0.17–1.22)
MONOCYTES NFR BLD AUTO: 10 % (ref 4–12)
NEUTROPHILS # BLD AUTO: 10.23 THOUSANDS/ΜL (ref 1.85–7.62)
NEUTS SEG NFR BLD AUTO: 69 % (ref 43–75)
NRBC BLD AUTO-RTO: 0 /100 WBCS
PLATELET # BLD AUTO: 314 THOUSANDS/UL (ref 149–390)
PMV BLD AUTO: 9 FL (ref 8.9–12.7)
POTASSIUM SERPL-SCNC: 4.2 MMOL/L (ref 3.5–5.3)
PROT SERPL-MCNC: 8 G/DL (ref 6.4–8.2)
RBC # BLD AUTO: 5.52 MILLION/UL (ref 3.88–5.62)
RH BLD: POSITIVE
SODIUM SERPL-SCNC: 140 MMOL/L (ref 136–145)
SPECIMEN EXPIRATION DATE: NORMAL
WBC # BLD AUTO: 14.74 THOUSAND/UL (ref 4.31–10.16)

## 2020-12-24 PROCEDURE — 36415 COLL VENOUS BLD VENIPUNCTURE: CPT

## 2020-12-24 PROCEDURE — 85730 THROMBOPLASTIN TIME PARTIAL: CPT

## 2020-12-24 PROCEDURE — 86900 BLOOD TYPING SEROLOGIC ABO: CPT

## 2020-12-24 PROCEDURE — 86901 BLOOD TYPING SEROLOGIC RH(D): CPT

## 2020-12-24 PROCEDURE — 80053 COMPREHEN METABOLIC PANEL: CPT

## 2020-12-24 PROCEDURE — 85025 COMPLETE CBC W/AUTO DIFF WBC: CPT

## 2020-12-24 PROCEDURE — 86850 RBC ANTIBODY SCREEN: CPT

## 2020-12-28 NOTE — PRE-PROCEDURE INSTRUCTIONS
My Surgical Experience    The following information was developed to assist you to prepare for your operation  What do I need to do before coming to the hospital?   Arrange for a responsible person to drive you to and from the hospital    Arrange care for your children at home  Children are not allowed in the recovery areas of the hospital   Plan to wear clothing that is easy to put on and take off  If you are having shoulder surgery, wear a shirt that buttons or zippers in the front  Bathing  o Shower the evening before and the morning of your surgery with an antibacterial soap  Please refer to the Pre Op Showering Instructions for Surgery Patients Sheet   o Remove nail polish and all body piercing jewelry  o Do not shave any body part for at least 24 hours before surgery-this includes face, arms, legs and upper body  Food  o Nothing to eat or drink after midnight the night before your surgery  This includes candy and chewing gum  o Exception: If your surgery is after 12:00pm (noon), you may have clear liquids such as 7-Up®, ginger ale, apple or cranberry juice, Jell-O®, water, or clear broth until 8:00 am  o Do not drink milk or juice with pulp on the morning before surgery  o Do not drink alcohol 24 hours before surgery  Medicine  o Follow instructions you received from your surgeon about which medicines you may take on the day of surgery  o If instructed to take medicine on the morning of surgery, take pills with just a small sip of water  Call your prescribing doctor for specific infroamtion on what to do if you take insulin    What should I bring to the hospital?    Bring:  Ayush Foster or a walker, if you have them, for foot or knee surgery   A list of the daily medicines, vitamins, minerals, herbals and nutritional supplements you take   Include the dosages of medicines and the time you take them each day   Glasses, dentures or hearing aids   Minimal clothing; you will be wearing hospital sleepwear   Photo ID; required to verify your identity   If you have a Living Will or Power of , bring a copy of the documents   If you have an ostomy, bring an extra pouch and any supplies you use    Do not bring   Medicines or inhalers   Money, valuables or jewelry    What other information should I know about the day of surgery?  Notify your surgeons if you develop a cold, sore throat, cough, fever, rash or any other illness   Report to the Ambulatory Surgical/Same Day Surgery Unit   You will be instructed to stop at Registration only if you have not been pre-registered   Inform your  fi they do not stay that they will be asked by the staff to leave a phone number where they can be reached   Be available to be reached before surgery  In the event the operating room schedule changes, you may be asked to come in earlier or later than expected    *It is important to tell your doctor and others involved in your health care if you are taking or have been taking any non-prescription drugs, vitamins, minerals, herbals or other nutritional supplements  Any of these may interact with some food or medicines and cause a reaction      Pre-Surgery Instructions:   Medication Instructions    ascorbic acid (VITAMIN C) 500 MG tablet Instructed patient per Anesthesia Guidelines   cholecalciferol (VITAMIN D3) 1,000 units tablet Instructed patient per Anesthesia Guidelines   ferrous sulfate 324 (65 Fe) mg Instructed patient per Anesthesia Guidelines   fexofenadine (ALLEGRA) 60 MG tablet Instructed patient per Anesthesia Guidelines   fluticasone (FLONASE) 50 mcg/act nasal spray Instructed patient per Anesthesia Guidelines   folic acid (FOLVITE) 1 mg tablet Instructed patient per Anesthesia Guidelines   zinc sulfate (ZINCATE) 220 mg capsule Instructed patient per Anesthesia Guidelines

## 2020-12-30 DIAGNOSIS — M25.561 CHRONIC PAIN OF RIGHT KNEE: ICD-10-CM

## 2020-12-30 DIAGNOSIS — M17.11 PRIMARY OSTEOARTHRITIS OF RIGHT KNEE: ICD-10-CM

## 2020-12-30 DIAGNOSIS — G89.29 CHRONIC PAIN OF RIGHT KNEE: ICD-10-CM

## 2020-12-30 PROCEDURE — U0003 INFECTIOUS AGENT DETECTION BY NUCLEIC ACID (DNA OR RNA); SEVERE ACUTE RESPIRATORY SYNDROME CORONAVIRUS 2 (SARS-COV-2) (CORONAVIRUS DISEASE [COVID-19]), AMPLIFIED PROBE TECHNIQUE, MAKING USE OF HIGH THROUGHPUT TECHNOLOGIES AS DESCRIBED BY CMS-2020-01-R: HCPCS | Performed by: PHYSICIAN ASSISTANT

## 2021-01-01 LAB — SARS-COV-2 RNA SPEC QL NAA+PROBE: NOT DETECTED

## 2021-01-04 ENCOUNTER — TELEPHONE (OUTPATIENT)
Dept: OBGYN CLINIC | Facility: HOSPITAL | Age: 61
End: 2021-01-04

## 2021-01-04 ENCOUNTER — ANESTHESIA EVENT (OUTPATIENT)
Dept: PERIOP | Facility: HOSPITAL | Age: 61
DRG: 470 | End: 2021-01-04
Payer: COMMERCIAL

## 2021-01-04 NOTE — ANESTHESIA PREPROCEDURE EVALUATION
Procedure:  ARTHROPLASTY KNEE TOTAL (Right Knee)    Relevant Problems   No relevant active problems        Physical Exam    Airway    Mallampati score: II  TM Distance: >3 FB  Neck ROM: full     Dental   No notable dental hx     Cardiovascular  Cardiovascular exam normal    Pulmonary  Pulmonary exam normal     Other Findings        Anesthesia Plan  ASA Score- 2     Anesthesia Type- regional and spinal with ASA Monitors  Additional Monitors:   Airway Plan:           Plan Factors-    Chart reviewed  Imaging results reviewed  Existing labs reviewed  Patient summary reviewed  Patient is not a current smoker  Induction-     Postoperative Plan-     Informed Consent- Anesthetic plan and risks discussed with patient  I personally reviewed this patient with the CRNA  Discussed and agreed on the Anesthesia Plan with the CRNA  [place]

## 2021-01-05 ENCOUNTER — ANESTHESIA (OUTPATIENT)
Dept: PERIOP | Facility: HOSPITAL | Age: 61
DRG: 470 | End: 2021-01-05
Payer: COMMERCIAL

## 2021-01-05 ENCOUNTER — HOSPITAL ENCOUNTER (INPATIENT)
Facility: HOSPITAL | Age: 61
LOS: 2 days | Discharge: HOME | DRG: 470 | End: 2021-01-07
Attending: ORTHOPAEDIC SURGERY | Admitting: ORTHOPAEDIC SURGERY
Payer: COMMERCIAL

## 2021-01-05 ENCOUNTER — APPOINTMENT (INPATIENT)
Dept: RADIOLOGY | Facility: HOSPITAL | Age: 61
DRG: 470 | End: 2021-01-05
Payer: COMMERCIAL

## 2021-01-05 VITALS — HEART RATE: 95 BPM

## 2021-01-05 DIAGNOSIS — I97.3 POSTOPERATIVE HYPERTENSION: ICD-10-CM

## 2021-01-05 DIAGNOSIS — Z96.651 S/P TOTAL KNEE REPLACEMENT USING CEMENT, RIGHT: Primary | ICD-10-CM

## 2021-01-05 DIAGNOSIS — M25.561 CHRONIC PAIN OF RIGHT KNEE: ICD-10-CM

## 2021-01-05 DIAGNOSIS — M17.11 PRIMARY OSTEOARTHRITIS OF RIGHT KNEE: ICD-10-CM

## 2021-01-05 DIAGNOSIS — I10 BENIGN ESSENTIAL HYPERTENSION: ICD-10-CM

## 2021-01-05 DIAGNOSIS — G89.29 CHRONIC PAIN OF RIGHT KNEE: ICD-10-CM

## 2021-01-05 PROCEDURE — C1776 JOINT DEVICE (IMPLANTABLE): HCPCS | Performed by: ORTHOPAEDIC SURGERY

## 2021-01-05 PROCEDURE — 99024 POSTOP FOLLOW-UP VISIT: CPT | Performed by: ORTHOPAEDIC SURGERY

## 2021-01-05 PROCEDURE — 97163 PT EVAL HIGH COMPLEX 45 MIN: CPT

## 2021-01-05 PROCEDURE — 97530 THERAPEUTIC ACTIVITIES: CPT

## 2021-01-05 PROCEDURE — C1713 ANCHOR/SCREW BN/BN,TIS/BN: HCPCS | Performed by: ORTHOPAEDIC SURGERY

## 2021-01-05 PROCEDURE — C9290 INJ, BUPIVACAINE LIPOSOME: HCPCS | Performed by: ANESTHESIOLOGY

## 2021-01-05 PROCEDURE — 73560 X-RAY EXAM OF KNEE 1 OR 2: CPT

## 2021-01-05 PROCEDURE — 27447 TOTAL KNEE ARTHROPLASTY: CPT | Performed by: ORTHOPAEDIC SURGERY

## 2021-01-05 PROCEDURE — 0SRC0J9 REPLACEMENT OF RIGHT KNEE JOINT WITH SYNTHETIC SUBSTITUTE, CEMENTED, OPEN APPROACH: ICD-10-PCS | Performed by: ORTHOPAEDIC SURGERY

## 2021-01-05 DEVICE — ATTUNE KNEE SYSTEM TIBIAL BASE FIXED BEARING SIZE 7 CEMENTED
Type: IMPLANTABLE DEVICE | Site: KNEE | Status: FUNCTIONAL
Brand: ATTUNE

## 2021-01-05 DEVICE — ATTUNE KNEE SYSTEM TIBIAL INSERT FIXED BEARING CRUCIATE RETAINING 7 5MM AOX
Type: IMPLANTABLE DEVICE | Site: KNEE | Status: FUNCTIONAL
Brand: ATTUNE

## 2021-01-05 DEVICE — IMPLANTABLE DEVICE: Type: IMPLANTABLE DEVICE | Site: KNEE | Status: FUNCTIONAL

## 2021-01-05 DEVICE — ATTUNE PATELLA MEDIALIZED DOME 35MM CEMENTED AOX
Type: IMPLANTABLE DEVICE | Site: KNEE | Status: FUNCTIONAL
Brand: ATTUNE

## 2021-01-05 DEVICE — ATTUNE KNEE SYSTEM FEMORAL CRUCIATE RETAINING SIZE 7 RIGHT CEMENTED
Type: IMPLANTABLE DEVICE | Site: KNEE | Status: FUNCTIONAL
Brand: ATTUNE

## 2021-01-05 RX ORDER — SCOLOPAMINE TRANSDERMAL SYSTEM 1 MG/1
1 PATCH, EXTENDED RELEASE TRANSDERMAL ONCE
Status: DISCONTINUED | OUTPATIENT
Start: 2021-01-05 | End: 2021-01-07 | Stop reason: HOSPADM

## 2021-01-05 RX ORDER — CELECOXIB 100 MG/1
100 CAPSULE ORAL 2 TIMES DAILY
Status: DISCONTINUED | OUTPATIENT
Start: 2021-01-05 | End: 2021-01-07 | Stop reason: HOSPADM

## 2021-01-05 RX ORDER — PANTOPRAZOLE SODIUM 40 MG/1
40 TABLET, DELAYED RELEASE ORAL DAILY
Status: DISCONTINUED | OUTPATIENT
Start: 2021-01-06 | End: 2021-01-07 | Stop reason: HOSPADM

## 2021-01-05 RX ORDER — PROPOFOL 10 MG/ML
INJECTION, EMULSION INTRAVENOUS AS NEEDED
Status: DISCONTINUED | OUTPATIENT
Start: 2021-01-05 | End: 2021-01-05

## 2021-01-05 RX ORDER — OXYCODONE HYDROCHLORIDE 10 MG/1
10 TABLET ORAL EVERY 4 HOURS PRN
Status: DISCONTINUED | OUTPATIENT
Start: 2021-01-05 | End: 2021-01-07 | Stop reason: HOSPADM

## 2021-01-05 RX ORDER — ONDANSETRON 2 MG/ML
4 INJECTION INTRAMUSCULAR; INTRAVENOUS ONCE AS NEEDED
Status: DISCONTINUED | OUTPATIENT
Start: 2021-01-05 | End: 2021-01-05 | Stop reason: HOSPADM

## 2021-01-05 RX ORDER — SODIUM CHLORIDE, SODIUM LACTATE, POTASSIUM CHLORIDE, CALCIUM CHLORIDE 600; 310; 30; 20 MG/100ML; MG/100ML; MG/100ML; MG/100ML
50 INJECTION, SOLUTION INTRAVENOUS CONTINUOUS
Status: DISCONTINUED | OUTPATIENT
Start: 2021-01-05 | End: 2021-01-05

## 2021-01-05 RX ORDER — HYDROMORPHONE HCL/PF 1 MG/ML
0.5 SYRINGE (ML) INJECTION EVERY 2 HOUR PRN
Status: DISCONTINUED | OUTPATIENT
Start: 2021-01-05 | End: 2021-01-07 | Stop reason: HOSPADM

## 2021-01-05 RX ORDER — OXYCODONE HCL 10 MG/1
10 TABLET, FILM COATED, EXTENDED RELEASE ORAL ONCE
Status: COMPLETED | OUTPATIENT
Start: 2021-01-05 | End: 2021-01-05

## 2021-01-05 RX ORDER — GABAPENTIN 300 MG/1
300 CAPSULE ORAL ONCE
Status: COMPLETED | OUTPATIENT
Start: 2021-01-05 | End: 2021-01-05

## 2021-01-05 RX ORDER — FENTANYL CITRATE/PF 50 MCG/ML
25 SYRINGE (ML) INJECTION
Status: COMPLETED | OUTPATIENT
Start: 2021-01-05 | End: 2021-01-05

## 2021-01-05 RX ORDER — OXYCODONE HYDROCHLORIDE 5 MG/1
5 TABLET ORAL EVERY 4 HOURS PRN
Status: DISCONTINUED | OUTPATIENT
Start: 2021-01-05 | End: 2021-01-07 | Stop reason: HOSPADM

## 2021-01-05 RX ORDER — FLUTICASONE PROPIONATE 50 MCG
2 SPRAY, SUSPENSION (ML) NASAL DAILY
Status: DISCONTINUED | OUTPATIENT
Start: 2021-01-06 | End: 2021-01-07 | Stop reason: HOSPADM

## 2021-01-05 RX ORDER — DOCUSATE SODIUM 100 MG/1
100 CAPSULE, LIQUID FILLED ORAL 2 TIMES DAILY
Status: DISCONTINUED | OUTPATIENT
Start: 2021-01-05 | End: 2021-01-07 | Stop reason: HOSPADM

## 2021-01-05 RX ORDER — BUPIVACAINE HYDROCHLORIDE 7.5 MG/ML
INJECTION, SOLUTION INTRASPINAL AS NEEDED
Status: DISCONTINUED | OUTPATIENT
Start: 2021-01-05 | End: 2021-01-05

## 2021-01-05 RX ORDER — CEFAZOLIN SODIUM 2 G/50ML
2000 SOLUTION INTRAVENOUS ONCE
Status: COMPLETED | OUTPATIENT
Start: 2021-01-05 | End: 2021-01-05

## 2021-01-05 RX ORDER — TRANEXAMIC ACID 100 MG/ML
INJECTION, SOLUTION INTRAVENOUS AS NEEDED
Status: DISCONTINUED | OUTPATIENT
Start: 2021-01-05 | End: 2021-01-05

## 2021-01-05 RX ORDER — MAGNESIUM HYDROXIDE 1200 MG/15ML
LIQUID ORAL AS NEEDED
Status: DISCONTINUED | OUTPATIENT
Start: 2021-01-05 | End: 2021-01-05 | Stop reason: HOSPADM

## 2021-01-05 RX ORDER — MIDAZOLAM HYDROCHLORIDE 2 MG/2ML
INJECTION, SOLUTION INTRAMUSCULAR; INTRAVENOUS AS NEEDED
Status: DISCONTINUED | OUTPATIENT
Start: 2021-01-05 | End: 2021-01-05

## 2021-01-05 RX ORDER — FENTANYL CITRATE 50 UG/ML
INJECTION, SOLUTION INTRAMUSCULAR; INTRAVENOUS AS NEEDED
Status: DISCONTINUED | OUTPATIENT
Start: 2021-01-05 | End: 2021-01-05

## 2021-01-05 RX ORDER — ASPIRIN 325 MG
325 TABLET ORAL 2 TIMES DAILY
Status: DISCONTINUED | OUTPATIENT
Start: 2021-01-05 | End: 2021-01-07 | Stop reason: HOSPADM

## 2021-01-05 RX ORDER — BUPIVACAINE HYDROCHLORIDE 5 MG/ML
INJECTION, SOLUTION PERINEURAL
Status: DISCONTINUED | OUTPATIENT
Start: 2021-01-05 | End: 2021-01-05

## 2021-01-05 RX ORDER — ONDANSETRON 2 MG/ML
INJECTION INTRAMUSCULAR; INTRAVENOUS AS NEEDED
Status: DISCONTINUED | OUTPATIENT
Start: 2021-01-05 | End: 2021-01-05

## 2021-01-05 RX ORDER — ACETAMINOPHEN 325 MG/1
975 TABLET ORAL ONCE
Status: COMPLETED | OUTPATIENT
Start: 2021-01-05 | End: 2021-01-05

## 2021-01-05 RX ORDER — BUPIVACAINE HYDROCHLORIDE 2.5 MG/ML
INJECTION, SOLUTION EPIDURAL; INFILTRATION; INTRACAUDAL AS NEEDED
Status: DISCONTINUED | OUTPATIENT
Start: 2021-01-05 | End: 2021-01-05 | Stop reason: HOSPADM

## 2021-01-05 RX ORDER — GABAPENTIN 100 MG/1
200 CAPSULE ORAL 2 TIMES DAILY
Status: DISCONTINUED | OUTPATIENT
Start: 2021-01-05 | End: 2021-01-07 | Stop reason: HOSPADM

## 2021-01-05 RX ORDER — ACETAMINOPHEN 325 MG/1
975 TABLET ORAL EVERY 8 HOURS
Status: DISCONTINUED | OUTPATIENT
Start: 2021-01-05 | End: 2021-01-07 | Stop reason: HOSPADM

## 2021-01-05 RX ORDER — DEXAMETHASONE SODIUM PHOSPHATE 4 MG/ML
10 INJECTION, SOLUTION INTRA-ARTICULAR; INTRALESIONAL; INTRAMUSCULAR; INTRAVENOUS; SOFT TISSUE ONCE
Status: COMPLETED | OUTPATIENT
Start: 2021-01-06 | End: 2021-01-06

## 2021-01-05 RX ORDER — ONDANSETRON 2 MG/ML
4 INJECTION INTRAMUSCULAR; INTRAVENOUS EVERY 6 HOURS PRN
Status: DISCONTINUED | OUTPATIENT
Start: 2021-01-05 | End: 2021-01-07 | Stop reason: HOSPADM

## 2021-01-05 RX ORDER — PROPOFOL 10 MG/ML
INJECTION, EMULSION INTRAVENOUS CONTINUOUS PRN
Status: DISCONTINUED | OUTPATIENT
Start: 2021-01-05 | End: 2021-01-05

## 2021-01-05 RX ORDER — SODIUM CHLORIDE 9 MG/ML
75 INJECTION, SOLUTION INTRAVENOUS CONTINUOUS
Status: DISCONTINUED | OUTPATIENT
Start: 2021-01-05 | End: 2021-01-06 | Stop reason: ALTCHOICE

## 2021-01-05 RX ORDER — LIDOCAINE HYDROCHLORIDE 10 MG/ML
INJECTION, SOLUTION EPIDURAL; INFILTRATION; INTRACAUDAL; PERINEURAL AS NEEDED
Status: DISCONTINUED | OUTPATIENT
Start: 2021-01-05 | End: 2021-01-05

## 2021-01-05 RX ORDER — CEFAZOLIN SODIUM 2 G/50ML
2000 SOLUTION INTRAVENOUS EVERY 8 HOURS
Status: COMPLETED | OUTPATIENT
Start: 2021-01-05 | End: 2021-01-06

## 2021-01-05 RX ORDER — DEXAMETHASONE SODIUM PHOSPHATE 4 MG/ML
INJECTION, SOLUTION INTRA-ARTICULAR; INTRALESIONAL; INTRAMUSCULAR; INTRAVENOUS; SOFT TISSUE AS NEEDED
Status: DISCONTINUED | OUTPATIENT
Start: 2021-01-05 | End: 2021-01-05

## 2021-01-05 RX ORDER — HYDROMORPHONE HCL/PF 1 MG/ML
0.5 SYRINGE (ML) INJECTION ONCE
Status: COMPLETED | OUTPATIENT
Start: 2021-01-05 | End: 2021-01-05

## 2021-01-05 RX ORDER — CHLORHEXIDINE GLUCONATE 0.12 MG/ML
15 RINSE ORAL ONCE
Status: COMPLETED | OUTPATIENT
Start: 2021-01-05 | End: 2021-01-05

## 2021-01-05 RX ORDER — MAGNESIUM HYDROXIDE/ALUMINUM HYDROXICE/SIMETHICONE 120; 1200; 1200 MG/30ML; MG/30ML; MG/30ML
30 SUSPENSION ORAL EVERY 6 HOURS PRN
Status: DISCONTINUED | OUTPATIENT
Start: 2021-01-05 | End: 2021-01-07 | Stop reason: HOSPADM

## 2021-01-05 RX ORDER — SODIUM CHLORIDE, SODIUM LACTATE, POTASSIUM CHLORIDE, CALCIUM CHLORIDE 600; 310; 30; 20 MG/100ML; MG/100ML; MG/100ML; MG/100ML
125 INJECTION, SOLUTION INTRAVENOUS CONTINUOUS
Status: DISCONTINUED | OUTPATIENT
Start: 2021-01-05 | End: 2021-01-05

## 2021-01-05 RX ADMIN — ACETAMINOPHEN 975 MG: 325 TABLET, FILM COATED ORAL at 17:07

## 2021-01-05 RX ADMIN — SODIUM CHLORIDE, SODIUM LACTATE, POTASSIUM CHLORIDE, AND CALCIUM CHLORIDE 125 ML/HR: .6; .31; .03; .02 INJECTION, SOLUTION INTRAVENOUS at 10:35

## 2021-01-05 RX ADMIN — BUPIVACAINE HYDROCHLORIDE IN DEXTROSE 1.6 ML: 7.5 INJECTION, SOLUTION SUBARACHNOID at 12:11

## 2021-01-05 RX ADMIN — FENTANYL CITRATE 25 MCG: 50 INJECTION, SOLUTION INTRAMUSCULAR; INTRAVENOUS at 15:05

## 2021-01-05 RX ADMIN — DEXAMETHASONE SODIUM PHOSPHATE 4 MG: 4 INJECTION, SOLUTION INTRA-ARTICULAR; INTRALESIONAL; INTRAMUSCULAR; INTRAVENOUS; SOFT TISSUE at 12:25

## 2021-01-05 RX ADMIN — PROPOFOL 50 MG: 10 INJECTION, EMULSION INTRAVENOUS at 12:14

## 2021-01-05 RX ADMIN — DOCUSATE SODIUM 100 MG: 100 CAPSULE, LIQUID FILLED ORAL at 17:06

## 2021-01-05 RX ADMIN — FENTANYL CITRATE 25 MCG: 50 INJECTION, SOLUTION INTRAMUSCULAR; INTRAVENOUS at 15:26

## 2021-01-05 RX ADMIN — CEFAZOLIN SODIUM 2000 MG: 2 SOLUTION INTRAVENOUS at 12:09

## 2021-01-05 RX ADMIN — OXYCODONE HYDROCHLORIDE 10 MG: 10 TABLET, FILM COATED, EXTENDED RELEASE ORAL at 09:59

## 2021-01-05 RX ADMIN — HYDROMORPHONE HYDROCHLORIDE 0.5 MG: 1 INJECTION, SOLUTION INTRAMUSCULAR; INTRAVENOUS; SUBCUTANEOUS at 20:10

## 2021-01-05 RX ADMIN — MIDAZOLAM 2 MG: 1 INJECTION INTRAMUSCULAR; INTRAVENOUS at 12:07

## 2021-01-05 RX ADMIN — PROPOFOL 90 MCG/KG/MIN: 10 INJECTION, EMULSION INTRAVENOUS at 12:14

## 2021-01-05 RX ADMIN — HYDROMORPHONE HYDROCHLORIDE 0.5 MG: 1 INJECTION, SOLUTION INTRAMUSCULAR; INTRAVENOUS; SUBCUTANEOUS at 15:49

## 2021-01-05 RX ADMIN — OXYCODONE HYDROCHLORIDE 10 MG: 10 TABLET ORAL at 17:06

## 2021-01-05 RX ADMIN — BUPIVACAINE HYDROCHLORIDE 10 ML: 5 INJECTION, SOLUTION PERINEURAL at 16:48

## 2021-01-05 RX ADMIN — ONDANSETRON 4 MG: 2 INJECTION INTRAMUSCULAR; INTRAVENOUS at 12:25

## 2021-01-05 RX ADMIN — CEFAZOLIN SODIUM 2000 MG: 2 SOLUTION INTRAVENOUS at 20:02

## 2021-01-05 RX ADMIN — ASPIRIN 325 MG: 325 TABLET, FILM COATED ORAL at 20:02

## 2021-01-05 RX ADMIN — TRANEXAMIC ACID 1000 MG: 1 INJECTION, SOLUTION INTRAVENOUS at 12:14

## 2021-01-05 RX ADMIN — CELECOXIB 100 MG: 100 CAPSULE ORAL at 17:06

## 2021-01-05 RX ADMIN — FENTANYL CITRATE 25 MCG: 50 INJECTION, SOLUTION INTRAMUSCULAR; INTRAVENOUS at 15:15

## 2021-01-05 RX ADMIN — ACETAMINOPHEN 975 MG: 325 TABLET, FILM COATED ORAL at 09:59

## 2021-01-05 RX ADMIN — FENTANYL CITRATE 25 MCG: 50 INJECTION, SOLUTION INTRAMUSCULAR; INTRAVENOUS at 15:37

## 2021-01-05 RX ADMIN — SCOPALAMINE 1 PATCH: 1 PATCH, EXTENDED RELEASE TRANSDERMAL at 09:59

## 2021-01-05 RX ADMIN — LIDOCAINE HYDROCHLORIDE 40 MG: 10 INJECTION, SOLUTION EPIDURAL; INFILTRATION; INTRACAUDAL; PERINEURAL at 12:14

## 2021-01-05 RX ADMIN — GABAPENTIN 200 MG: 100 CAPSULE ORAL at 17:06

## 2021-01-05 RX ADMIN — CHLORHEXIDINE GLUCONATE 0.12% ORAL RINSE 15 ML: 1.2 LIQUID ORAL at 09:59

## 2021-01-05 RX ADMIN — GABAPENTIN 300 MG: 300 CAPSULE ORAL at 09:59

## 2021-01-05 RX ADMIN — FENTANYL CITRATE 25 MCG: 50 INJECTION, SOLUTION INTRAMUSCULAR; INTRAVENOUS at 12:22

## 2021-01-05 RX ADMIN — SODIUM CHLORIDE 75 ML/HR: 0.9 INJECTION, SOLUTION INTRAVENOUS at 18:00

## 2021-01-05 NOTE — OP NOTE
OPERATIVE REPORT  PATIENT NAME: Matty Junior    :  1960  MRN: [id number]  Pt Location: [facility] OR ROOM 01    SURGERY DATE: 2021    Surgeon(s) and Role:     * Giovanna Orta DO - Primary     * Murphy Mariee PA-C - Assisting, no qualified resident was available an assistant was needed for patient positioning, prepping and draping, soft tissue retraction, protection of vital structures throughout the case, and to complete the case safely  Rosemary Adkins ATC/OTC - 2nd Assist    Preop Diagnosis:  Primary osteoarthritis of right knee [M17 11]  Chronic pain of right knee [M25 561, G89 29]    Post-Op Diagnosis Codes:     * Primary osteoarthritis of right knee [M17 11]     * Chronic pain of right knee [M25 561, G89 29]    Procedure(s) (LRB):  ARTHROPLASTY KNEE TOTAL (Right)    Specimen(s):  * No specimens in log *    Estimated Blood Loss:   30 mL    Drains:  None  Urethral Catheter Latex 16 Fr  (Active)   Reasons to continue Urinary Catheter  Post-operative urological requirements 21 165   Goal for Removal Remove POD#1 21 165   Site Assessment Clean;Skin intact 21 165   Collection Container Standard drainage bag 21 165   Securement Method Securing device (Describe) 21   Output (mL) 150 mL 21 1559   Number of days: 0       Anesthesia Type:   Spinal with sedation, postoperative adductor canal block with Exparel  Intravenous fluids:  650 cc    Antibiotics:  Ancef 2 g    Urine output:  Rosas:  200 cc    Tourniquet time:  76 minutes at 250 mmHg    Implants: Depuy Attune size 7 right CR femur, size 7 S+ tibia, size 5 AOX CR polyethylene, size 35 polyethylene patella    Operative Indications:  Primary osteoarthritis of right knee [M17 11]  Chronic pain of right knee [M25 561, G89 29]  Patient is a 66-year-old male known to me for treatment of his bilateral knee osteoarthritis associated activity-related pain  His right was worse than the left    He has failed conservative measures of treatment for his bilateral knee pain and associated severe osteoarthritis  With failure of conservative treatment, persistent activity-related pain and limitations in activity recommended he undergo bilateral total knee arthroplasty  He would like to pursue the right knee 1st as this is more painful the 2  The patient was medically optimized for the intended procedure  The risks and benefits of undergoing the procedure were discussed at length  The patient had cleared his prior COVID infection in October and was deemed optimized and stable for the intended procedure on 1/5/2021  He underwent right total knee arthroplasty on 1/5/2021    Operative Findings:  After completion of the arthrotomy there was evidence of grade 4 changes in all 3 compartments  There was no significant bony loss or erosion  Ultimately a cemented right total knee arthroplasty was successfully implanted  Range of motion was from 0-130 degrees with excellent stability at 0° 30° 90°  Patella tracked midline and flat  After closure of the arthrotomy, range of motion, stability, and patellar tracking were unchanged  Patient tolerated procedure well  There were no complications  Complications:   None    Procedure and Technique:  The patient was identified and marked in the preoperative holding area  Final questions were addressed  Consents were visualized for correct laterality and the intended procedure  Patient was taken back to the operating room where they were transferred to the operating room table and administered spinal  anesthesia by the anesthesia staff  Tourniquet was then applied to the right thigh  The right lower extremity was prepped and draped in the standard sterile fashion with the addition of the Gracia knee positioner  The patient was administered 2 g of IV Ancef  Tranexamic acid  was administered by the anesthesia staff    A final timeout was performed and all members of the operating room staff were in agreement of the intended procedure and the correct laterality was confirmed  An anterior knee incision was marked over the anterior right knee and carried over the medial portion of the patella down medial to the tibial tubercle  The leg was exsanguinated and the tourniquet was inflated to 250 mmHg  An incision was made over the anterior knee using a scalpel, and sharp dissection was carried deep through Sophia's fascia creating full thickness flaps  The extensor mechanism was identified  A standard medial parapatellar arthrotomy was performed using a scalpel, and upon doing so benign-appearing yellow intra-articular fluid was expressed  The anterior horn of the medial and lateral meniscus was removed  50% of the patellar fat pad was removed for proper exposure  The distal arthrotomy was used to initiate a medial release around the proximal tibia at the joint line to the mid coronal plane  On inspection there was diffuse grade 4 degenerative change in the medial compartment, lateral compartment, and patellofemoral compartment  The intramedullary drill for the femur was introduced into the medullary canal anterior to the PCL  The distal femoral cutting jig was inserted after being set to 5° of valgus and a 9 mm resection  The distal femoral cutting jig was pinned in place at the 0 position  The distal femur was cut using an oscillating saw  The distal femoral sizing rotation guide was applied to the distal femur and femoral rotation was set to match Blaire's line and confirmed with the epicondylar axis  This was found to be 3° of external rotation and its position was pinned  The femur was sized to be a size 7  The sizing rotation guide was removed and a size 7  four-in-one cutting block was applied to the distal femur  Checking the anterior resection this appeared to be the appropriate size both in the AP and ML planes, and would not cause anterior femoral notching   The block was pinned in place and the anterior, posterior, posterior chamfer, and anterior chamfer cuts were made in succession more protecting all periarticular soft tissues and ligaments  The cutting block was removed and the perimeter of the femur was cleaned from remaining osteophytes using a rongeur  The ACL and PCL were removed using electrocautery  The distal femoral finishing guide was applied to the femur and trochlea was completed  The tibia was subluxed anteriorly in preparation for the tibial resection  The remaining portions of the medial and lateral meniscus were removed using a 15 blade being sure not to violate the MCL or LCL  The area of the lateral geniculate artery was then cauterized using electrocautery  On inspection of the tibia there no significant erosion of the medial tibial plateau and no significant erosion of the lateral tibial plateau  The tibial cutting jig and extra-medullary alignment guide was applied to the leg  Level of resection was set to just below the osteochondral junction of the proximal medial tibial plateau  The patient's native slope was evaluated and it was found that 5° of posterior slope would be appropriate  The jig was pinned in the medial lateral proximal tibia  Varus valgus alignment was confirmed and was appropriate at neutral to the mechanical axis  With the cutting jig pinned into place and proximal tibia was resected while protecting soft tissues  The proximal tibial osteophytes were removed  A trial tibial baseplate was placed upon the proximal tibia to check alignment and the alignment was unchanged and remained in neutral  The proximal tibia was sized and a size 7  baseplate was found to be appropriate and provided good cortical fit  The baseplate was pinned into place in appropriate rotation aligning with the medial third of the tibial tubercle  Tibia was then reamed broached and prepped in sequence in preparation for trialing       Trialing was initiated with a size 5 polyethylene insert placed upon the size 7 tibial baseplate  The size 7 standard femur was then placed upon the femur and trialing began  The 5 mm polyethylene trial demonstrated balanced medial and lateral gaps in flexion and extension while maintaining full extension and reaching 130 ° of flexion  Coronal stability testing revealed that medial lateral gaps were symmetric and extension, mid flexion, and full flexion  The knee demonstrated excellent stability with a range of motion from 0-130 ° of flexion  The femoral lug drills were drilled, and the trials were removed  Attention was then turned to the patella  The osteo synovial reflection was revealed using a Bovie  The patella height measured 26  millimeters prior to resection  The patella was sized and found to be a 35  mm patellar button  The cutting guide was set for the appropriate depth and the patella was evenly resected using oscillating saw  The 35  mm patellar button was then medialized over the patella and the lug holes were drilled  A trial patella button was placed upon the patella and the pre osteotomy patellar height was restored  A lateral facetectomy of the patella was performed  The tibial trial was removed and the knee was irrigated and debris was removed  The soft tissues of the posterior capsule were cauterized using Bovie to ensure hemostasis  Remaining fragments of the posterior medial and lateral meniscus were removed  The posterior capsule and medial and lateral periarticular soft tissues were injected with a periarticular analgesic cocktail  The knee was again irrigated in preparation for cementation  The tibia was subluxed and all retractors were in place for cementation when final implants were confirmed and placed upon the back table  2 bags of Palacos cement without gentamicin were mixed   And the final size 7 S+ tibial implant, 5  mm  AOX CR conforming polyethylene insert, size 7 standard CR right femur, and 35  mm patella button were cemented in sequence  Excess cement was removed after each implant was in place  As the cement cured the remainder of the periarticular pain cocktail was injected into the soft tissues around the knee  Irrigation then followed  After the cement had cured the joint was inspected for any residual loose bodies of cement and these were removed  The tourniquet was released after 76  minutes at 250 mmHg  The tracking and stability of the final components were assessed  The patella tracked midline without tilt, and the knee was stable in both flexion and extension, coronal stability was unchanged, and the knee demonstrated range of motion from 0-130 °  The knee was again irrigated and a very conservative partial synovectomy was performed  Hemostasis was achieved using electrocautery  Closure began using a #2 barbed bidirectional suture for the arthrotomy  After closure of the arthrotomy range of motion to gravity was tested and was found to be 0-130 ° of flexion  Quarter percent Marcaine plain was injected in the subcutaneous soft tissues  The deep subcutaneous tissues were reapproximated with undyed 0 Vicryl, the superficial subcutaneous tissues were reapproximated with undyed 2-0 Vicryl, the skin edges reapproximated with a running 3-0 bidirectional barbed Monocryl suture, and the skin edges were sealed with Dermabond  After the Dermabond had dried a silver impregnated Mepilex dressing was placed over the incision  The drapes were removed and USHA stockings were placed on the bilateral lower extremities  Patient was then awakened from anesthesia without difficulty, and transferred to PACU in stable condition          I was present for all critical portions of the procedure    Patient Disposition:  PACU     SIGNATURE: Zarina Ruelas DO  DATE: January 5, 2021  TIME: 5:41 PM

## 2021-01-05 NOTE — PROGRESS NOTES
Progress Note - Orthopedics   Ervin Fillers 61 y o  male MRN: [id number]  Unit/Bed#: 2 [place] [address] Encounter: [id number]    Assessment:  1) POD#0 s/p R TKA    Plan:  Ancef 2 g IV x 2 for 24 hours postop  DVT prophylaxis: ASA 325mg PO BID/SCD's/Ambulation  WBAT RLE  PT/OT- WBAT RLE  Analgesia PRN  Follow up AM labs  D/c fan in AM POD #1  Dressing- monitor for drainage  Discharge planning - disposition pending progress with PT postoperatively    Weight bearing: WBAT RLE    VTE Pharmacologic Prophylaxis: ASA 325mg PO BID  VTE Mechanical Prophylaxis: sequential compression device    Subjective:  Patient seen examined in PACU  Pain controlled  Events of surgery discussed with the patient the patient's girlfriend via telephone  Vitals: Blood pressure 157/98, pulse 69, temperature (!) 97 4 °F (36 3 °C), resp  rate 18, height 6' (1 829 m), weight 123 kg (271 lb 8 oz), SpO2 96 %  ,Body mass index is 36 82 kg/m²  Intake/Output Summary (Last 24 hours) at 1/5/2021 1739  Last data filed at 1/5/2021 1604  Gross per 24 hour   Intake 850 ml   Output 600 ml   Net 250 ml       Invasive Devices     Peripheral Intravenous Line            Peripheral IV 01/05/21 Right Hand less than 1 day          Drain            Urethral Catheter Latex 16 Fr  less than 1 day                Physical Exam: NAD  Ortho Exam: RLE: Dsg c/d/i, compartments soft, calf non-tender, +PF/DF/EHL, +DP/SP/Saph/Sural SILT, DP 2+, foot warm    Lab, Imaging and other studies: PO XR R knee:  Reviewed and acceptable    [employer] GARZA    Division of Adult Reconstruction  Department of Orthopaedic Surgery  [facility]

## 2021-01-05 NOTE — DISCHARGE INSTRUCTIONS
Total Knee Replacement     WHAT YOU SHOULD KNOW:   Total knee replacement is surgery to replace a knee joint damaged by wear, injury, or disease  It is also called a total knee arthroplasty  The knee joint is where your femur (thigh bone) and tibia (large lower leg bone or shin bone) meet  A small bone called the patella (kneecap) protects your knee joint  AFTER YOU LEAVE:     Medicines:   · Pain medicine: You may be given a prescription medicine to decrease pain  Do not wait until the pain is severe before you take this medicine  We recommend that you take Tylenol 975mg every 8 hours for baseline pain control  Oxycodone can be used as needed, but no more than 1-2 tablets every 4 to 6 hours  · NSAIDs:  These medicines decrease swelling, pain, and fever  NSAIDs are available without a doctor's order  Ask your primary healthcare provider which medicine is right for you  Ask how much to take and when to take it  Take as directed  NSAIDs can cause stomach bleeding and kidney problems if not taken correctly  You will be given Celebrex 100 mg to take twice a day for the first 2 weeks after surgery  · Stool softeners  make it easier for you to have a bowel movement  You may need this medicine to treat or prevent constipation  You will be given Colace 100mg to take twice a day    · Anticoagulants  are a type of blood thinner medicine that helps prevent clots  Clots can cause strokes, heart attacks, and death  These medicines may cause you to bleed or bruise more easily  You will be given aspirin 325 mg to take twice a day for 6 weeks after surgery  ¨ Watch for bleeding from your gums or nose  Watch for blood in your urine and bowel movements  Use a soft washcloth and a soft toothbrush  If you shave, use an electric razor  Avoid activities that can cause bruising or bleeding  · Take your medicine as directed    Call your healthcare provider if you think your medicine is not helping or if you have side effects  Tell him if you are allergic to any medicine  Keep a list of the medicines, vitamins, and herbs you take  Include the amounts, and when and why you take them  Bring the list or the pill bottles to follow-up visits  Carry your medicine list with you in case of an emergency  Follow up with your orthopedist as directed: You may need to return to have your wound checked and stitches, staples, or drain removed  Write down your questions so you remember to ask them during your visits  Please make an appointment to see Dr Richard Gomes 2 weeks postoperatively  Physical therapy:  A physical therapist teaches you exercises to help improve movement and strength, and to decrease pain  You will begin outpatient physical therapy later this week as planned  Self-care:   · Wound Care:  Please leave the Mepilex dressing in place for 7 days after surgery  After 7 days, you may remove the dressing and leave the incision open to air  Do not get the dressing or the incision wet until your seen in the office  If the incision is uncomfortable under clothing after the Mepilex is removed, you may cover it with a a dry sterile dressing, but should remain dry at all times  · Use ice:  Ice helps decrease swelling and pain  Ice may also help prevent tissue damage  Use an ice pack or put crushed ice in a plastic bag  Cover it with a towel, and place it on your knee for 15 to 20 minutes every hour as directed  · Wear pressure stockings: These are long, tight stockings that put pressure on your legs to promote blood flow and prevent clots  · Use a knee brace, cane, walker, or crutches as directed: These devices will help decrease your risk of falling  Contact your orthopedist if:  · You have a fever over 101 0°F     · You have trouble moving or bending your joint  · You have increased pain and swelling in your joint, even after you take pain medicine      · You have back pain or lower leg pain when you bend your foot upwards  · You have questions or concerns about your condition or care  Seek immediate care or call 911 if:   · You feel like you are going to faint  · Blood soaks through/saturates your bandage  · Your incision comes apart  · Your incision is red, swollen, or draining pus  · You cannot walk or move your joint, or the limb is numb  · Your arm or leg feels warm, tender, and painful  It may look swollen and red  · You have a seizure or feel confused  · You feel lightheaded, short of breath, and have chest pain  · You have chest pain when you take a deep breath or cough  You may cough up blood  © 2014 [address] is for End User's use only and may not be sold, redistributed or otherwise used for commercial purposes  All illustrations and images included in CareNotes® are the copyrighted property of [employer] , [employer]  or Krish Coleman  The above information is an  only  It is not intended as medical advice for individual conditions or treatments  Talk to your doctor, nurse or pharmacist before following any medical regimen to see if it is safe and effective for you

## 2021-01-05 NOTE — ANESTHESIA PROCEDURE NOTES
Peripheral Block    Patient location during procedure: post-op  Start time: 1/5/2021 3:30 PM  Reason for block: at surgeon's request and post-op pain management  Staffing  Anesthesiologist: Fabio Flaherty MD  Performed: anesthesiologist   Preanesthetic Checklist  Completed: patient identified, site marked, surgical consent, pre-op evaluation, timeout performed, IV checked, risks and benefits discussed and monitors and equipment checked  Peripheral Block  Patient position: supine  Prep: ChloraPrep  Patient monitoring: heart rate, continuous pulse ox and frequent blood pressure checks  Block type: adductor canal block  Laterality: right  Injection technique: single-shot  Procedures: ultrasound guided, Ultrasound guidance required for the procedure to increase accuracy and safety of medication placement and decrease risk of complications    Ultrasound permanent image savedbupivacaine (MARCAINE) 0 5 % perineural infiltration, 10 mL (exparel 10 cc)  Needle  Needle type: Stimuplex   Needle gauge: 22 G  Needle length: 10 cm  Needle localization: ultrasound guidance  Assessment  Injection assessment: incremental injection, local visualized surrounding nerve on ultrasound, negative aspiration for heme and no paresthesia on injection  Paresthesia pain: none  Heart rate change: no  Slow fractionated injection: yes  Post-procedure:  site cleaned  patient tolerated the procedure well with no immediate complications

## 2021-01-05 NOTE — PHYSICAL THERAPY NOTE
PT EVALUATION       01/05/21 1710   PT Last Visit   PT Visit Date 01/05/21   Note Type   Note type Evaluation   Pain Assessment   Pain Assessment Tool 0-10   Pain Score 9   Pain Location/Orientation Orientation: Right;Location: Knee   Home Living   Type of [address] Multi-level;1/2 bath on main level; Able to live on main level with bedroom/bathroom;Stairs to enter with rails   Bathroom Shower/Tub Tub/shower unit   Bathroom Equipment Tub transfer bench   Home Equipment Walker;Cane   Prior Function   Level of [place] Independent with ADLs and functional mobility   Lives With Alone   Receives Help From Family   ADL Assistance Independent   IADLs Independent   Falls in the last 6 months 0   Comments His cousin is coming from [place] to stay with him post op until able to drive   Restrictions/Precautions   Weight Bearing Precautions Per Order Yes   RLE Weight Bearing Per Order WBAT   Other Precautions Chair Alarm; Bed Alarm;O2;Fall Risk;Pain   General   Additional Pertinent History POD #0 for right TKA   Family/Caregiver Present No   Cognition   Overall Cognitive Status WFL   Arousal/Participation Cooperative   Orientation Level Oriented X4   Following Commands Follows all commands and directions without difficulty   RUE Assessment   RUE Assessment WFL   LUE Assessment   LUE Assessment WFL   RLE Assessment   RLE Assessment   (0 - 90)   LLE Assessment   LLE Assessment WFL   Bed Mobility   Supine to Sit 4  Minimal assistance   Additional items Assist x 1;Verbal cues;LE management   Sit to Supine 4  Minimal assistance   Additional items Assist x 1;LE management;Verbal cues   Transfers   Sit to Stand 4  Minimal assistance   Additional items Assist x 1;Verbal cues   Stand to Sit 4  Minimal assistance   Additional items Assist x 1;Verbal cues  (plopped onto bed)   Ambulation/Elevation   Gait pattern Antalgic   Gait Assistance 4  Minimal assist   Additional items Assist x 1;Verbal cues   Assistive Device Rolling walker   Distance 3 feet   Balance   Static Sitting Good   Dynamic Sitting Fair +   Static Standing Fair  (with RW)   Dynamic Standing Fair -  (with RW)   Ambulatory Fair -  (with RW)   Activity Tolerance   Activity Tolerance Patient limited by pain   Nurse Made Aware yes: RN present   Assessment   Prognosis Good   Problem List Decreased strength;Decreased range of motion;Decreased endurance; Impaired balance;Decreased mobility;Obesity; Decreased skin integrity;Pain   Assessment Patient seen for Physical Therapy evaluation  Patient admitted with S/P total knee replacement using cement, right  Comorbidities affecting patient's physical performance include: ankle edema, obesity, osteoarthritis  Personal factors affecting patient at time of initial evaluation include: lives in multi story house, stairs to enter home, inability to ambulate household distances, inability to navigate community distances, inability to navigate level surfaces without external assistance, limited home support, inability to perform ADLS and inability to perform IADLS   Prior to admission, patient was independent with functional mobility without assistive device, independent with ADLS, independent with IADLS, living in a multi-level home, living alone in multi story home with 5 steps to enter, ambulating household distance, ambulating community distances and lives in a multilevel house but has 1st floor setup  Please find objective findings from Physical Therapy assessment regarding body systems outlined above with impairments and limitations including weakness, decreased ROM, impaired balance, decreased endurance, gait deviations, pain, decreased activity tolerance, decreased functional mobility tolerance, fall risk and decreased skin integrity  The Barthel Index was used as a functional outcome tool presenting with a score of 45 today indicating marked limitations of functional mobility and ADLS    Patient's clinical presentation is currently unstable/unpredictable as seen in patient's presentation of changing level of pain, increased fall risk, new onset of impairment of functional mobility, decreased endurance and new onset of weakness  Pt would benefit from continued Physical Therapy treatment to address deficits as defined above and maximize level of functional mobility  As demonstrated by objective findings, the assigned level of complexity for this evaluation is high  Goals   Patient Goals to have less pain  (RN giving meds at end of session)   STG Expiration Date 01/12/21   Short Term Goal #1 Indep with all bedmobs and transfers    Short Term Goal #2 Indep with amb  using RW for functional household distances  (60 feet or >)   LTG Expiration Date 01/19/21   Long Term Goal #1 Indep with amb  with RW for community distances to allow pt to attend [address] #2 Indep navigating at least 5 stairs to allow pt to enter/exit his home  Plan   Treatment/Interventions ADL retraining;Functional transfer training;LE strengthening/ROM; Elevations; Therapeutic exercise; Endurance training;Patient/family training;Equipment eval/education; Bed mobility;Gait training;Spoke to nursing;Spoke to case management;OT   PT Frequency Twice a day   Recommendation   PT Discharge Recommendation Other (Comment)  (home with family support and OPPT)   Barthel Index   Feeding 10   Bathing 0   Grooming Score 0   Dressing Score 5   Bladder Score 0   Bowels Score 10   Toilet Use Score 5   Transfers (Bed/Chair) Score 10   Mobility (Level Surface) Score 0   Stairs Score 5   Barthel Index Score 45   Licensure   [place] License Number  Gio Rodriges PT  [id number]   PT TREATMENT  17:00-17:10  10 min  S: Pt states that he is having right knee pain  Also going to need the left knee done at some point  O: Pt completed gentle AAROM and APs in supine  Instructed to not put pillow under his knee  Pt transferred to short sit to acclimate to sitting    At side of bed: instructed with AROM into flexion  PT instructed pt on use of UEs to off load RLE when advancing LLE to achieve a WBAT status  Pt sit > stand with Min A   Stood at walker and able to demonstrate wgt shifting r/l with min A followed by mini marches   Pt sidestepped  3 feet along bed before sitting at side of bed: pt plopped onto bed  Min A to manage RLE into bed  Cold pack applied to right knee  RN present giving meds  All needs in reach  A: Tolerated fairly well  Limited by increased pain, however was motivated and cooperative  P: Cont  As per plan  Expect pt to progress tomorrow with therapy and d/c home with OPPT    [facility]

## 2021-01-05 NOTE — PLAN OF CARE
Problem: Potential for Falls  Goal: Patient will remain free of falls  Description: INTERVENTIONS:  - Assess patient frequently for physical needs  -  Identify cognitive and physical deficits and behaviors that affect risk of falls    -  [place] fall precautions as indicated by assessment   - Educate patient/family on patient safety including physical limitations  - Instruct patient to call for assistance with activity based on assessment  - Modify environment to reduce risk of injury  - Consider OT/PT consult to assist with strengthening/mobility  Outcome: Progressing     Problem: PAIN - ADULT  Goal: Verbalizes/displays adequate comfort level or baseline comfort level  Description: Interventions:  - Encourage patient to monitor pain and request assistance  - Assess pain using appropriate pain scale  - Administer analgesics based on type and severity of pain and evaluate response  - Implement non-pharmacological measures as appropriate and evaluate response  - Consider cultural and social influences on pain and pain management  - Notify physician/advanced practitioner if interventions unsuccessful or patient reports new pain  Outcome: Progressing     Problem: INFECTION - ADULT  Goal: Absence or prevention of progression during hospitalization  Description: INTERVENTIONS:  - Assess and monitor for signs and symptoms of infection  - Monitor lab/diagnostic results  - Monitor all insertion sites, i e  indwelling lines, tubes, and drains  - Monitor endotracheal if appropriate and nasal secretions for changes in amount and color  - [place] appropriate cooling/warming therapies per order  - Administer medications as ordered  - Instruct and encourage patient and family to use good hand hygiene technique  - Identify and instruct in appropriate isolation precautions for identified infection/condition  Outcome: Progressing     Problem: SAFETY ADULT  Goal: Patient will remain free of falls  Description: INTERVENTIONS:  - Assess patient frequently for physical needs  -  Identify cognitive and physical deficits and behaviors that affect risk of falls    -  [place] fall precautions as indicated by assessment   - Educate patient/family on patient safety including physical limitations  - Instruct patient to call for assistance with activity based on assessment  - Modify environment to reduce risk of injury  - Consider OT/PT consult to assist with strengthening/mobility  Outcome: Progressing  Goal: Maintain or return to baseline ADL function  Description: INTERVENTIONS:  -  Assess patient's ability to carry out ADLs; assess patient's baseline for ADL function and identify physical deficits which impact ability to perform ADLs (bathing, care of mouth/teeth, toileting, grooming, dressing, etc )  - Assess/evaluate cause of self-care deficits   - Assess range of motion  - Assess patient's mobility; develop plan if impaired  - Assess patient's need for assistive devices and provide as appropriate  - Encourage maximum independence but intervene and supervise when necessary  - Involve family in performance of ADLs  - Assess for home care needs following discharge   - Consider OT consult to assist with ADL evaluation and planning for discharge  - Provide patient education as appropriate  Outcome: Progressing  Goal: Maintain or return mobility status to optimal level  Description: INTERVENTIONS:  - Assess patient's baseline mobility status (ambulation, transfers, stairs, etc )    - Identify cognitive and physical deficits and behaviors that affect mobility  - Identify mobility aids required to assist with transfers and/or ambulation (gait belt, sit-to-stand, lift, walker, cane, etc )  - [place] fall precautions as indicated by assessment  - Record patient progress and toleration of activity level on Mobility SBAR; progress patient to next Phase/Stage  - Instruct patient to call for assistance with activity based on assessment  - Consider rehabilitation consult to assist with strengthening/weightbearing, etc   Outcome: Progressing     Problem: DISCHARGE PLANNING  Goal: Discharge to home or other facility with appropriate resources  Description: INTERVENTIONS:  - Identify barriers to discharge w/patient and caregiver  - Arrange for needed discharge resources and transportation as appropriate  - Identify discharge learning needs (meds, wound care, etc )  - Arrange for interpretive services to assist at discharge as needed  - Refer to Case Management Department for coordinating discharge planning if the patient needs post-hospital services based on physician/advanced practitioner order or complex needs related to functional status, cognitive ability, or social support system  Outcome: Progressing     Problem: Knowledge Deficit  Goal: Patient/family/caregiver demonstrates understanding of disease process, treatment plan, medications, and discharge instructions  Description: Complete learning assessment and assess knowledge base    Interventions:  - Provide teaching at level of understanding  - Provide teaching via preferred learning methods  Outcome: Progressing     Problem: RESPIRATORY - ADULT  Goal: Achieves optimal ventilation and oxygenation  Description: INTERVENTIONS:  - Assess for changes in respiratory status  - Assess for changes in mentation and behavior  - Position to facilitate oxygenation and minimize respiratory effort  - Oxygen administered by appropriate delivery if ordered  - Initiate smoking cessation education as indicated  - Encourage broncho-pulmonary hygiene including cough, deep breathe, Incentive Spirometry  - Assess the need for suctioning and aspirate as needed  - Assess and instruct to report SOB or any respiratory difficulty  - Respiratory Therapy support as indicated  Outcome: Progressing     Problem: GASTROINTESTINAL - ADULT  Goal: Minimal or absence of nausea and/or vomiting  Description: INTERVENTIONS:  - Administer IV fluids if ordered to ensure adequate hydration  - Maintain NPO status until nausea and vomiting are resolved  - Nasogastric tube if ordered  - Administer ordered antiemetic medications as needed  - Provide nonpharmacologic comfort measures as appropriate  - Advance diet as tolerated, if ordered  - Consider nutrition services referral to assist patient with adequate nutrition and appropriate food choices  Outcome: Progressing  Goal: Maintains or returns to baseline bowel function  Description: INTERVENTIONS:  - Assess bowel function  - Encourage oral fluids to ensure adequate hydration  - Administer IV fluids if ordered to ensure adequate hydration  - Administer ordered medications as needed  - Encourage mobilization and activity  - Consider nutritional services referral to assist patient with adequate nutrition and appropriate food choices  Outcome: Progressing  Goal: Maintains adequate nutritional intake  Description: INTERVENTIONS:  - Monitor percentage of each meal consumed  - Identify factors contributing to decreased intake, treat as appropriate  - Assist with meals as needed  - Monitor I&O, weight, and lab values if indicated  - Obtain nutrition services referral as needed  Outcome: Progressing     Problem: GENITOURINARY - ADULT  Goal: Maintains or returns to baseline urinary function  Description: INTERVENTIONS:  - Assess urinary function  - Encourage oral fluids to ensure adequate hydration if ordered  - Administer IV fluids as ordered to ensure adequate hydration  - Administer ordered medications as needed  - Offer frequent toileting  - Follow urinary retention protocol if ordered  Outcome: Progressing  Goal: Absence of urinary retention  Description: INTERVENTIONS:  - Assess patients ability to void and empty bladder  - Monitor I/O  - Bladder scan as needed  - Discuss with physician/AP medications to alleviate retention as needed  - Discuss catheterization for long term situations as appropriate  Outcome: Progressing  Goal: Urinary catheter remains patent  Description: INTERVENTIONS:  - Assess patency of urinary catheter  - If patient has a chronic fan, consider changing catheter if non-functioning  - Follow guidelines for intermittent irrigation of non-functioning urinary catheter  Outcome: Progressing     Problem: SKIN/TISSUE INTEGRITY - ADULT  Goal: Skin integrity remains intact  Description: INTERVENTIONS  - Identify patients at risk for skin breakdown  - Assess and monitor skin integrity  - Assess and monitor nutrition and hydration status  - Monitor labs (i e  albumin)  - Assess for incontinence   - Turn and reposition patient  - Assist with mobility/ambulation  - Relieve pressure over bony prominences  - Avoid friction and shearing  - Provide appropriate hygiene as needed including keeping skin clean and dry  - Evaluate need for skin moisturizer/barrier cream  - Collaborate with interdisciplinary team (i e  Nutrition, Rehabilitation, etc )   - Patient/family teaching  Outcome: Progressing  Goal: Incision(s), wounds(s) or drain site(s) healing without S/S of infection  Description: INTERVENTIONS  - Assess and document risk factors for skin impairment   - Assess and document dressing, incision, wound bed, drain sites and surrounding tissue  - Consider nutrition services referral as needed  - Oral mucous membranes remain intact  - Provide patient/ family education  Outcome: Progressing     Problem: HEMATOLOGIC - ADULT  Goal: Maintains hematologic stability  Description: INTERVENTIONS  - Assess for signs and symptoms of bleeding or hemorrhage  - Monitor labs  - Administer supportive blood products/factors as ordered and appropriate  Outcome: Progressing     Problem: MUSCULOSKELETAL - ADULT  Goal: Maintain or return mobility to safest level of function  Description: INTERVENTIONS:  - Assess patient's ability to carry out ADLs; assess patient's baseline for ADL function and identify physical deficits which impact ability to perform ADLs (bathing, care of mouth/teeth, toileting, grooming, dressing, etc )  - Assess/evaluate cause of self-care deficits   - Assess range of motion  - Assess patient's mobility  - Assess patient's need for assistive devices and provide as appropriate  - Encourage maximum independence but intervene and supervise when necessary  - Involve family in performance of ADLs  - Assess for home care needs following discharge   - Consider OT consult to assist with ADL evaluation and planning for discharge  - Provide patient education as appropriate  Outcome: Progressing  Goal: Maintain proper alignment of affected body part  Description: INTERVENTIONS:  - Support, maintain and protect limb and body alignment  - Provide patient/ family with appropriate education  Outcome: Progressing

## 2021-01-05 NOTE — ANESTHESIA PROCEDURE NOTES
Spinal Block    Patient location during procedure: OR  Start time: 1/5/2021 12:11 PM  Reason for block: at surgeon's request and post-op pain management  Staffing  Anesthesiologist: Torrie Bhatti MD  Performed: anesthesiologist   Preanesthetic Checklist  Completed: patient identified, site marked, surgical consent, pre-op evaluation, timeout performed, IV checked, risks and benefits discussed and monitors and equipment checked  Spinal Block  Patient position: sitting  Prep: Betadine and ChloraPrep  Patient monitoring: heart rate, continuous pulse ox and frequent blood pressure checks  Approach: midline  Location: L3-4  Injection technique: single-shot  Needle  Needle type: pencil-tip   Needle gauge: 25 G  Needle length: 10 cm  Assessment  Sensory level: T10  Injection Assessment:  positive aspiration for clear CSF, no paresthesia on injection and negative aspiration for heme    Post-procedure:  site cleaned

## 2021-01-05 NOTE — ANESTHESIA POSTPROCEDURE EVALUATION
Post-Op Assessment Note    CV Status:  Stable  Pain Score: 0    Pain management: adequate     Mental Status:  Sleepy   Hydration Status:  Stable and euvolemic   PONV Controlled:  None   Airway Patency:  Patent       Staff: CRNA         No complications documented      BP  158/82   Temp 36 0C   Pulse  82   Resp 20   SpO2 99%6LFM

## 2021-01-05 NOTE — H&P
Assessment/Plan:  1  Bilateral primary osteoarthritis of knee  XR knee 3 vw right non injury     XR knee 3 vw left non injury     Case request operating room: ARTHROPLASTY KNEE TOTAL - RIGHT     Comprehensive metabolic panel     Hemoglobin A1C W/EAG Estimation     CBC and differential     If Symptomatic, order: UA w Reflex to Microscopic w Reflex to Culture     PAT Covid Screening     Protime-INR     APTT     Type and screen     MRSA culture     Ambulatory referral to Family Practice     Ambulatory referral to Physical Therapy     EKG 12 lead     XR chest pa & lateral     Case request operating room: ARTHROPLASTY KNEE TOTAL - RIGHT     ascorbic acid (VITAMIN C) 500 MG tablet     ferrous sulfate 324 (65 Fe) mg     folic acid (FOLVITE) 1 mg tablet     zinc sulfate (ZINCATE) 220 mg capsule     cholecalciferol (VITAMIN D3) 1,000 units tablet   2  Chronic pain of both knees  XR knee 3 vw right non injury     XR knee 3 vw left non injury     Case request operating room: ARTHROPLASTY KNEE TOTAL - RIGHT     Comprehensive metabolic panel     Hemoglobin A1C W/EAG Estimation     CBC and differential     If Symptomatic, order: UA w Reflex to Microscopic w Reflex to Culture     PAT Covid Screening     Protime-INR     APTT     Type and screen     MRSA culture     Ambulatory referral to Family Practice     Ambulatory referral to Physical Therapy     EKG 12 lead     XR chest pa & lateral     Case request operating room: ARTHROPLASTY KNEE TOTAL - RIGHT     ascorbic acid (VITAMIN C) 500 MG tablet     ferrous sulfate 324 (65 Fe) mg     folic acid (FOLVITE) 1 mg tablet     zinc sulfate (ZINCATE) 220 mg capsule     cholecalciferol (VITAMIN D3) 1,000 units tablet   3   Pre-op exam  Ambulatory referral to Family Practice     Ambulatory referral to Physical Therapy     ascorbic acid (VITAMIN C) 500 MG tablet     ferrous sulfate 324 (65 Fe) mg     folic acid (FOLVITE) 1 mg tablet     zinc sulfate (ZINCATE) 220 mg capsule     cholecalciferol (VITAMIN D3) 1,000 units tablet           Scribe Attestation    I,:   Chad Contreras am acting as a scribe while in the presence of the attending physician :        I,:   Trevin Garza, DO personally performed the services described in this documentation    as scribed in my presence  :            Please see below the last office encounter to serve as today's H& P  There has been no changes in his orthopedic exam since last evaluation  The patient was diagnosed with COVID prior to his initial surgical date but has been seen and cleared by his PCPs status post COVID infection  He denies any current fever, chills, nausea, vomiting, headache, chest pain, trouble breathing  Patient be a candidate for aspirin postoperatively for DVT prophylaxis  He will also be a good candidate for outpatient physical therapy following his discharge from surgery  He has been cleared for the intended procedure today  All questions addressed   Proceed to OR for right total knee arthroplasty  Vitals:    01/05/21 0956   BP: 135/93   Pulse: 75   Resp: 16   Temp: (!) 95 7 °F (35 4 °C)   SpO2: 97%           Mercedes Rand is a very pleasant 77-year-old male who returns today for follow-up evaluation of his chronic bilateral knee pain due to underlying osteoarthritis  At his last visit I explained that he would be a candidate for total knee arthroplasty after 11/06/2020 as he has failed extensive conservative treatment including maintenance of appropriate weight, activity modification, bracing, use of an assistive device, exercise program, and injection therapy and continues to experience daily pain limiting his activities of daily living and enjoyment  The pre deb and postoperative expectations were discussed here in the office today  The risks and benefits of undergoing right total knee arthroplasty were discussed at length and consents were signed and placed in the chart   Please see risk discussion below    He is a candidate for scheduling a procedure after 11/06/2020  He denies a history of DVT/PE, MRSA infection, diabetes, GI bleeding or peptic ulcer, or malignancy  He is not a smoker  He understands he will require clearance from his primary care physician  He is an excellent candidate for outpatient physical therapy following his procedure  He will meet with my surgery scheduler today to pick a date for his procedure and make preoperative arrangements  All of his questions and concerns were addressed today in the office  We will see him back at time of surgery      Subjective: Follow-up evaluation for chronic bilateral knee pain     Patient ID: Khari Vásquez is a 61 y o  male who returns today for follow-up evaluation of his chronic bilateral knee due to underlying osteoarthritis  At his last visit his candidacy for undergoing total knee arthroplasty was discussed  At today's visit, he states that he has right proceed with surgery when he is a candidate  He has continued to experience diffuse activity related pain about both knees that can reach 6/10 on the pain scale  He states that recently his right knee has been more symptomatic than his left  He is hopeful to move forward with a right total knee arthroplasty  He denies any new injury or trauma      Review of Systems   Constitutional: Positive for activity change  Negative for chills, fever and unexpected weight change  HENT: Negative for hearing loss, nosebleeds and sore throat  Eyes: Negative for pain, redness and visual disturbance  Respiratory: Negative for cough, shortness of breath and wheezing  Cardiovascular: Negative for chest pain, palpitations and leg swelling  Gastrointestinal: Negative for abdominal pain, nausea and vomiting  Endocrine: Negative for polyphagia and polyuria  Genitourinary: Negative for dysuria and hematuria  Musculoskeletal: Positive for arthralgias and joint swelling  Negative for myalgias          See HPI   Skin: Negative for rash and wound  Neurological: Negative for dizziness, numbness and headaches  Psychiatric/Behavioral: Negative for decreased concentration and suicidal ideas   The patient is not nervous/anxious              Medical History        Past Medical History:   Diagnosis Date    Ankle edema 2015    Obesity      Organic impotence      Seasonal allergies             Surgical History         Past Surgical History:   Procedure Laterality Date    US GUIDED MSK PROCEDURE   2020                 Family History   Problem Relation Age of Onset    Alzheimer's disease Mother      Cancer Mother           Social History            Occupational History    Not on file   Tobacco Use    Smoking status: Former Smoker       Last attempt to quit: 1970       Years since quittin 1    Smokeless tobacco: Never Used   Substance and Sexual Activity    Alcohol use: No    Drug use: Never    Sexual activity: Not on file            Current Outpatient Medications:     ascorbic acid (VITAMIN C) 500 MG tablet, Take 1 tablet (500 mg total) by mouth 2 (two) times a day, Disp: 60 tablet, Rfl: 0    cholecalciferol (VITAMIN D3) 1,000 units tablet, Take 1 tablet (1,000 Units total) by mouth daily, Disp: 30 tablet, Rfl: 0    ferrous sulfate 324 (65 Fe) mg, Take 1 tablet (324 mg total) by mouth daily before breakfast, Disp: 30 tablet, Rfl: 0    fexofenadine (ALLEGRA) 60 MG tablet, TAKE 1 TABLET DAILY, Disp: 90 tablet, Rfl: 3    fluticasone (FLONASE) 50 mcg/act nasal spray, USE 2 SPRAYS IN EACH NOSTRIL DAILY, Disp: 48 g, Rfl: 3    folic acid (FOLVITE) 1 mg tablet, Take 1 tablet (1 mg total) by mouth daily, Disp: 30 tablet, Rfl: 0    Glucosamine-Chondroitin-Vit D3 750-600-500 MG-MG-UNIT TABS, Take 1 tablet by mouth daily, Disp: , Rfl:     multivitamin (THERAGRAN) TABS, Take 1 tablet by mouth daily, Disp: , Rfl:     naproxen (NAPROSYN) 500 mg tablet, Take 1 tablet (500 mg total) by mouth 2 (two) times a day with meals, Disp: 30 tablet, Rfl: 0    PEG 3350-KCl-NaCl-NaSulf-Na Asc-C (MOVIPREP) 100 g, Take by mouth, Disp: , Rfl:     zinc sulfate (ZINCATE) 220 mg capsule, Take 1 capsule (220 mg total) by mouth daily, Disp: 30 capsule, Rfl: 0     No Known Allergies     Objective:      Vitals:     10/09/20 1158   BP: 120/78   Pulse: 87         Body mass index is 36 54 kg/m²      Right Knee Exam      Tenderness   The patient is experiencing tenderness in the lateral joint line, medial joint line and patella      Range of Motion   Right knee extension: 0  Right knee flexion: 110       Tests   Varus: negative Valgus: negative  Drawer:  Anterior - negative    Posterior - negative  Patellar apprehension: negative     Other   Erythema: absent  Scars: absent  Sensation: normal  Pulse: present  Swelling: none  Effusion: no effusion present     Comments:  Stable at 0, 30 and 90°  Parapatellar crepitance noted with active and passive range of motion  Patellofemoral grind:  Positive        Left Knee Exam      Tenderness   The patient is experiencing tenderness in the lateral joint line, medial joint line and patella      Range of Motion   Left knee extension: 5  Left knee flexion: 100       Tests   Varus: negative Valgus: negative  Drawer:  Anterior - negative     Posterior - negative  Patellar apprehension: negative     Other   Erythema: absent  Scars: absent  Sensation: normal  Pulse: present  Swelling: none  Effusion: no effusion present     Comments:  Stable at 5, 30 and 90°  Parapatellar crepitance noted with active and passive range of motion  Patellofemoral grind:  Positive              Observations   Left Knee   Negative for effusion       Right Knee   Negative for effusion          Physical Exam  Vitals signs and nursing note reviewed  Constitutional:       Appearance: He is well-developed  HENT:      Head: Normocephalic and atraumatic     Eyes:      Conjunctiva/sclera: Conjunctivae normal       Pupils: Pupils are equal, round, and reactive to light  Neck:      Musculoskeletal: Normal range of motion and neck supple  Cardiovascular:      Rate and Rhythm: Normal rate  Pulmonary:      Effort: Pulmonary effort is normal  No respiratory distress  Musculoskeletal:      Right knee: He exhibits no effusion  Left knee: He exhibits no effusion  Comments: As noted in HPI   Skin:     General: Skin is warm and dry  Neurological:      Mental Status: He is alert and oriented to person, place, and time  Psychiatric:         Behavior: Behavior normal             I have personally reviewed pertinent films in PACS  X-ray of the right knee with magnification marker obtained on 10/09/2020 reviewed demonstrating severe degenerative change most prominent in the lateral compartment with near bone-on-bone contact, sclerosis, and marginal osteophytosis  There is no acute fracture, dislocation, lytic or blastic lesion      The patient was counseled in detail regarding the diagnosis, the treatment options available, the prognosis of each treatment option, the potential risks and complications  Zarina Ramirez are, but are not limited to; deep vein thrombosis, pulmonary embolism, neurologic and vascular injury, infection, instability, leg length discrepancy, dislocation, hematoma, reflex sympathetic dystrophy, loss of range of motion, ankylosis of the knee, fracture, screw or prosthetic perforation, chronic pain, acute pain, chronic leg pain and edema, loosening, death, heart attack, and stroke   The patient's questions were answered in detail   The patient demonstrates understanding of these risks and wishes to proceed with surgery

## 2021-01-06 LAB
ANION GAP SERPL CALCULATED.3IONS-SCNC: 4 MMOL/L (ref 4–13)
BASOPHILS # BLD MANUAL: 0 THOUSAND/UL (ref 0–0.1)
BASOPHILS NFR MAR MANUAL: 0 % (ref 0–1)
BUN SERPL-MCNC: 12 MG/DL (ref 5–25)
CALCIUM SERPL-MCNC: 8.4 MG/DL (ref 8.3–10.1)
CHLORIDE SERPL-SCNC: 103 MMOL/L (ref 100–108)
CO2 SERPL-SCNC: 30 MMOL/L (ref 21–32)
CREAT SERPL-MCNC: 0.83 MG/DL (ref 0.6–1.3)
EOSINOPHIL # BLD MANUAL: 0.51 THOUSAND/UL (ref 0–0.4)
EOSINOPHIL NFR BLD MANUAL: 3 % (ref 0–6)
ERYTHROCYTE [DISTWIDTH] IN BLOOD BY AUTOMATED COUNT: 13.7 % (ref 11.6–15.1)
GFR SERPL CREATININE-BSD FRML MDRD: 96 ML/MIN/1.73SQ M
GLUCOSE SERPL-MCNC: 112 MG/DL (ref 65–140)
HCT VFR BLD AUTO: 44.9 % (ref 36.5–49.3)
HGB BLD-MCNC: 14.8 G/DL (ref 12–17)
LYMPHOCYTES # BLD AUTO: 1.69 THOUSAND/UL (ref 0.6–4.47)
LYMPHOCYTES # BLD AUTO: 10 % (ref 14–44)
MCH RBC QN AUTO: 30 PG (ref 26.8–34.3)
MCHC RBC AUTO-ENTMCNC: 33 G/DL (ref 31.4–37.4)
MCV RBC AUTO: 91 FL (ref 82–98)
MONOCYTES # BLD AUTO: 1.18 THOUSAND/UL (ref 0–1.22)
MONOCYTES NFR BLD: 7 % (ref 4–12)
NEUTROPHILS # BLD MANUAL: 13.53 THOUSAND/UL (ref 1.85–7.62)
NEUTS BAND NFR BLD MANUAL: 1 % (ref 0–8)
NEUTS SEG NFR BLD AUTO: 79 % (ref 43–75)
PLATELET # BLD AUTO: 266 THOUSANDS/UL (ref 149–390)
PLATELET BLD QL SMEAR: ADEQUATE
PMV BLD AUTO: 9.2 FL (ref 8.9–12.7)
POTASSIUM SERPL-SCNC: 4.8 MMOL/L (ref 3.5–5.3)
RBC # BLD AUTO: 4.93 MILLION/UL (ref 3.88–5.62)
RBC MORPH BLD: NORMAL
SODIUM SERPL-SCNC: 137 MMOL/L (ref 136–145)
TOTAL CELLS COUNTED SPEC: 100
WBC # BLD AUTO: 16.91 THOUSAND/UL (ref 4.31–10.16)

## 2021-01-06 PROCEDURE — 80048 BASIC METABOLIC PNL TOTAL CA: CPT | Performed by: PHYSICIAN ASSISTANT

## 2021-01-06 PROCEDURE — 93005 ELECTROCARDIOGRAM TRACING: CPT

## 2021-01-06 PROCEDURE — 85007 BL SMEAR W/DIFF WBC COUNT: CPT | Performed by: PHYSICIAN ASSISTANT

## 2021-01-06 PROCEDURE — 85027 COMPLETE CBC AUTOMATED: CPT | Performed by: PHYSICIAN ASSISTANT

## 2021-01-06 PROCEDURE — 99024 POSTOP FOLLOW-UP VISIT: CPT | Performed by: ORTHOPAEDIC SURGERY

## 2021-01-06 PROCEDURE — 97116 GAIT TRAINING THERAPY: CPT

## 2021-01-06 PROCEDURE — 99222 1ST HOSP IP/OBS MODERATE 55: CPT | Performed by: NURSE PRACTITIONER

## 2021-01-06 PROCEDURE — 97110 THERAPEUTIC EXERCISES: CPT

## 2021-01-06 PROCEDURE — 97167 OT EVAL HIGH COMPLEX 60 MIN: CPT

## 2021-01-06 PROCEDURE — 97535 SELF CARE MNGMENT TRAINING: CPT

## 2021-01-06 RX ORDER — AMLODIPINE BESYLATE 10 MG/1
10 TABLET ORAL DAILY
Status: DISCONTINUED | OUTPATIENT
Start: 2021-01-06 | End: 2021-01-07 | Stop reason: HOSPADM

## 2021-01-06 RX ORDER — HYDRALAZINE HYDROCHLORIDE 20 MG/ML
5 INJECTION INTRAMUSCULAR; INTRAVENOUS EVERY 6 HOURS PRN
Status: DISCONTINUED | OUTPATIENT
Start: 2021-01-06 | End: 2021-01-07 | Stop reason: HOSPADM

## 2021-01-06 RX ADMIN — OXYCODONE HYDROCHLORIDE 5 MG: 5 TABLET ORAL at 11:23

## 2021-01-06 RX ADMIN — ACETAMINOPHEN 975 MG: 325 TABLET, FILM COATED ORAL at 17:28

## 2021-01-06 RX ADMIN — AMLODIPINE BESYLATE 10 MG: 10 TABLET ORAL at 20:36

## 2021-01-06 RX ADMIN — CELECOXIB 100 MG: 100 CAPSULE ORAL at 17:28

## 2021-01-06 RX ADMIN — OXYCODONE HYDROCHLORIDE 10 MG: 10 TABLET ORAL at 07:39

## 2021-01-06 RX ADMIN — OXYCODONE HYDROCHLORIDE 5 MG: 5 TABLET ORAL at 17:28

## 2021-01-06 RX ADMIN — ACETAMINOPHEN 975 MG: 325 TABLET, FILM COATED ORAL at 00:23

## 2021-01-06 RX ADMIN — OXYCODONE HYDROCHLORIDE 5 MG: 5 TABLET ORAL at 22:59

## 2021-01-06 RX ADMIN — DOCUSATE SODIUM 100 MG: 100 CAPSULE, LIQUID FILLED ORAL at 09:24

## 2021-01-06 RX ADMIN — GABAPENTIN 200 MG: 100 CAPSULE ORAL at 17:27

## 2021-01-06 RX ADMIN — HYDROMORPHONE HYDROCHLORIDE 0.5 MG: 1 INJECTION, SOLUTION INTRAMUSCULAR; INTRAVENOUS; SUBCUTANEOUS at 09:24

## 2021-01-06 RX ADMIN — DEXAMETHASONE SODIUM PHOSPHATE 10 MG: 4 INJECTION, SOLUTION INTRAMUSCULAR; INTRAVENOUS at 11:23

## 2021-01-06 RX ADMIN — CEFAZOLIN SODIUM 2000 MG: 2 SOLUTION INTRAVENOUS at 04:12

## 2021-01-06 RX ADMIN — ASPIRIN 325 MG: 325 TABLET, FILM COATED ORAL at 20:36

## 2021-01-06 RX ADMIN — PANTOPRAZOLE SODIUM 40 MG: 40 TABLET, DELAYED RELEASE ORAL at 09:24

## 2021-01-06 RX ADMIN — OXYCODONE HYDROCHLORIDE 10 MG: 10 TABLET ORAL at 02:15

## 2021-01-06 RX ADMIN — HYDRALAZINE HYDROCHLORIDE 5 MG: 20 INJECTION INTRAMUSCULAR; INTRAVENOUS at 23:00

## 2021-01-06 RX ADMIN — GABAPENTIN 200 MG: 100 CAPSULE ORAL at 09:24

## 2021-01-06 RX ADMIN — HYDROMORPHONE HYDROCHLORIDE 0.5 MG: 1 INJECTION, SOLUTION INTRAMUSCULAR; INTRAVENOUS; SUBCUTANEOUS at 13:48

## 2021-01-06 RX ADMIN — DOCUSATE SODIUM 100 MG: 100 CAPSULE, LIQUID FILLED ORAL at 17:28

## 2021-01-06 RX ADMIN — ACETAMINOPHEN 975 MG: 325 TABLET, FILM COATED ORAL at 09:24

## 2021-01-06 RX ADMIN — ASPIRIN 325 MG: 325 TABLET, FILM COATED ORAL at 09:24

## 2021-01-06 RX ADMIN — CELECOXIB 100 MG: 100 CAPSULE ORAL at 09:24

## 2021-01-06 RX ADMIN — HYDROMORPHONE HYDROCHLORIDE 0.5 MG: 1 INJECTION, SOLUTION INTRAMUSCULAR; INTRAVENOUS; SUBCUTANEOUS at 04:05

## 2021-01-06 NOTE — PROGRESS NOTES
Progress Note - Orthopedics   Chong Wilson 61 y o  male MRN: [id number]  Unit/Bed#: 2 [place] [address] Encounter: [id number]    Assessment:  1) POD#1 s/p Right TKA    Plan:  Ancef 2 g IV x 2 for 24 hours postop - completed  DVT prophylaxis: ASA 325mg PO BID/SCD's/Ambulation  PT/OT- WBAT RLE  Analgesia PRN  Follow up AM labs - H/H/BUN/Cr/gfr stable, no evidence hematoma, may DC IVF  Rosas DC - monitor for void  Dressing- monitor for drainage, Mepilex may remain in place 7 days  Discharge planning - disposition pending progress with PT postoperatively, plan for DC home today to begin outpatient PT later this week    Weight bearing: WBAT RLE    VTE Pharmacologic Prophylaxis: ASA 325mg PO BID  VTE Mechanical Prophylaxis: sequential compression device    Subjective:  Patient seen and examined in bed this morning  Pain controlled  No overnight events  Able work with physical therapy yesterday  Denies chest pain, shortness of breath, dizziness, lightheadedness, or paresthesias    Vitals: Blood pressure 160/97, pulse 72, temperature 98 6 °F (37 °C), resp  rate 18, height 6' (1 829 m), weight 123 kg (271 lb 8 oz), SpO2 94 %  ,Body mass index is 36 82 kg/m²        Intake/Output Summary (Last 24 hours) at 1/6/2021 1019  Last data filed at 1/6/2021 0631  Gross per 24 hour   Intake 1090 ml   Output 1300 ml   Net -210 ml       Invasive Devices     Peripheral Intravenous Line            Peripheral IV 01/05/21 Right Hand less than 1 day                Physical Exam: NAD, A&Ox3, resting comfortably in bed  Ortho Exam: RLE: right anterior knee dsg c/d/i, compartments soft, calf non-tender, +PF/DF/EHL, +DP/SP/Saph/Sural SILT, DP 2+, foot warm, TEDs/foot pumps in place    Lab, Imaging and other studies: PO XR R knee:  Reviewed and acceptable    Lab Results   Component Value Date    WBC 16 91 (H) 01/06/2021    HGB 14 8 01/06/2021    HCT 44 9 01/06/2021    MCV 91 01/06/2021     01/06/2021     Lab Results   Component Value Date    NA 142 11/10/2016    SODIUM 137 01/06/2021    K 4 8 01/06/2021     01/06/2021    CO2 30 01/06/2021    AGAP 4 01/06/2021    BUN 12 01/06/2021    CREATININE 0 83 01/06/2021    GLUC 112 01/06/2021    GLUF 96 12/24/2020    CALCIUM 8 4 01/06/2021    AST 18 12/24/2020    ALT 31 12/24/2020    ALKPHOS 79 12/24/2020    PROT 7 1 11/10/2016    TP 8 0 12/24/2020    BILITOT 0 6 11/10/2016    TBILI 1 00 12/24/2020    EGFR 96 01/06/2021     Isabel Buchanan PA-C

## 2021-01-06 NOTE — OCCUPATIONAL THERAPY NOTE
Occupational Therapy Evaluation/Treatment       01/06/21 1055   OT Last Visit   OT Visit Date 01/06/21   Note Type   Note type Evaluation   Restrictions/Precautions   RLE Weight Bearing Per Order WBAT   Other Precautions Fall Risk;Pain   Pain Assessment   Pain Assessment Tool 0-10   Pain Score 7   Pain Location/Orientation Orientation: Right;Location: Knee   Home Living   Type of Adeel Gutierrez Multi-level;1/2 bath on main level; Able to live on main level with bedroom/bathroom  (5 KEZIA)   Bathroom Shower/Tub Tub/shower unit   Bathroom Toilet Standard   Bathroom Equipment Tub transfer bench   Home Equipment Walker;Cane   Additional Comments pts cousin will be staying with patient upon discharge    Prior Function   Level of [place] Independent with ADLs and functional mobility   Lives With Alone   Receives Help From Family   ADL Assistance Independent   IADLs Independent   Vocational On disability   Lifestyle   Intrinsic Gratification fishing   ADL   Eating Assistance 7  Independent   Grooming Assistance 5  Supervision/Setup   UB Bathing Assistance 5  Supervision/Setup   LB Bathing Assistance 4  Minimal Assistance   UB Dressing Assistance 5  Supervision/Setup   LB Dressing Assistance 4  [address]  4  Minimal Assistance   Bed Mobility   Supine to Sit 5  Supervision   Additional items Verbal cues   Transfers   Sit to Stand 4  Minimal assistance   Additional items Assist x 1;Verbal cues   Stand to Sit 4  Minimal assistance   Additional items Assist x 1;Verbal cues   Functional Mobility   Functional Mobility 4  Minimal assistance   Additional Comments 15 feet   Additional items Rolling walker   Balance   Static Sitting Good   Dynamic Sitting Fair +   Static Standing Fair   Dynamic Standing Fair -   Activity Tolerance   Activity Tolerance Patient limited by fatigue;Patient limited by pain  (dizziness)   RUE Assessment   RUE Assessment WFL   LUE Assessment   LUE Assessment [facility] Cognition   Overall Cognitive Status WFL   Arousal/Participation Cooperative   Attention Within functional limits   Orientation Level Oriented X4   Following Commands Follows all commands and directions without difficulty   Assessment   Limitation Decreased ADL status; Decreased Safe judgement during ADL;Decreased endurance;Decreased self-care trans;Decreased high-level ADLs  (decreased balance and mobility )   Prognosis Good   Assessment Patient evaluated by Occupational Therapy  Patient admitted with S/P total knee replacement using cement, right  The patients occupational profile, medical and therapy history includes a extensive additional review of physical, cognitive, or psychosocial history related to current functional performance  Comorbidities affecting functional mobility and ADLS include: arthritis, COVID 19  Prior to admission, patient was independent with functional mobility without assistive device, independent with ADLS and independent with IADLS  The evaluation identifies the following performance deficits: weakness, impaired balance, decreased endurance, increased fall risk, new onset of impairment of functional mobility, decreased ADLS, decreased IADLS, pain, decreased activity tolerance, decreased safety awareness and impaired judgement, that result in activity limitations and/or participation restrictions  This evaluation requires clinical decision making of high complexity, because the patient presents with comorbidites that affect occupational performance and required significant modification of tasks or assistance with consideration of multiple treatment options  The Barthel Index was used as a functional outcome tool presenting with a score of 55, indicating marked limitations of functional mobility and ADLS  Patient will benefit from skilled Occupational Therapy services to address above deficits and facilitate a safe return to prior level of function     Goals   Patient Goals to go home and go fishing    STG Time Frame   (1-7 days)   Short Term Goal  Goals established to promote patient goal of to go home and go fishing:  Patient will increase standing tolerance to 3 minutes during ADL task to decrease assistance level and decrease fall risk; Patient will increase bed mobility to independent in preparation for ADLS and transfers; Patient will increase functional mobility to and from bathroom with rolling walker with supervision to increase performance with ADLS and to use a toilet; Patient will improve functional activity tolerance to 10 minutes of sustained functional tasks to increase participation in basic self-care and decrease assistance level;   Patient will increase dynamic standing balance to fair to improve postural stability and decrease fall risk during standing ADLS and transfers  LTG Time Frame   (8-14 days)   Long Term Goal Goals established to promote patient goal of to go home and go fishing:  Patient will increase standing tolerance to 6 minutes during ADL task to decrease assistance level and decrease fall risk;  Patient will increase functional mobility to and from bathroom with rolling walker independent to increase performance with ADLS and to use a toilet; Patient will improve functional activity tolerance to 20 minutes of sustained functional tasks to increase participation in basic self-care and decrease assistance level;   Patient will increase dynamic standing balance to fair+ to improve postural stability and decrease fall risk during standing ADLS and transfers      Functional Transfer Goals   Pt Will Perform All Functional Transfers   (STG supervision LTG independent )   ADL Goals   Pt Will Perform Grooming Standing at sink  (STG supervision LTG independent )   Pt Will Perform Bathing   (STG supervision LTG independent )   Pt Will Perform LE Dressing   (STG supervision LTG independent )   Pt Will Perform Toileting   (STG supervision LTG independent )   Plan Treatment Interventions ADL retraining;Functional transfer training; Endurance training;Patient/family training;Equipment evaluation/education; Activityengagement; Compensatory technique education   Goal Expiration Date 01/20/21   OT Frequency 5x/wk   Additional Treatment Session   Start Time 1036   End Time 1055   Treatment Assessment Patient instructed on car transfers, verbalized understanding  Patient completed donning underwear and pants with min assist, shoes with supervision seated  Patient donned shirt independently seated  Patient completed toilet transfer with supervision with verbal cues  Patient stood to wash hands at sink with min assist/supervision  Functional mobility 15 feet with RW, supervision  Patient tolerated well  Limited by dizziness  Patients cousin able to assist with ADLS/IADLS as needed  Patient has a reacher at home and verbalized understanding however does not need for LB dressing  Patient has all equipment he needs for home       Recommendation   OT Discharge Recommendation Return to previous environment with social support   Barthel Index   Feeding 10   Bathing 0   Grooming Score 0   Dressing Score 5   Bladder Score 10   Bowels Score 10   Toilet Use Score 5   Transfers (Bed/Chair) Score 10   Mobility (Level Surface) Score 0   Stairs Score 5   Barthel Index Score 54   Licensure   [place] License Number  Bhavin Payal Alas Johnson 87 OTR/L [id number]

## 2021-01-06 NOTE — UTILIZATION REVIEW
Initial Clinical Review    Elective inpatient  surgical procedure  Age/Sex: 61 y o  male admit for treatment of  right knee pain with underlying osteoarthritis  Cleared now for surgery after testing positive for covid 19  Anesthesia Start Date/Time: 01/05/21 1208   Procedure: ARTHROPLASTY KNEE TOTAL (Right Knee)   Anesthesia type: regional, spinal   Diagnosis:        Primary osteoarthritis of right knee [M17 11]       Chronic pain of right knee [M25 561, G89 29]   Pre-op diagnosis:        Primary osteoarthritis of right knee [M17 11]       Chronic pain of right knee [M25 561, G89 29]     Procedure(s) (LRB):  ARTHROPLASTY KNEE TOTAL (Right    Estimated Blood Loss:   30 mL    Operative Indications:  Primary osteoarthritis of right knee [M17 11]  Chronic pain of right knee [M25 561, G89 29]  Patient is a 69-year-old male known to me for treatment of his bilateral knee osteoarthritis associated activity-related pain  His right was worse than the left  He has failed conservative measures of treatment for his bilateral knee pain and associated severe osteoarthritis  With failure of conservative treatment, persistent activity-related pain and limitations in activity recommended he undergo bilateral total knee arthroplasty  He would like to pursue the right knee 1st as this is more painful the 2  The patient was medically optimized for the intended procedure  The risks and benefits of undergoing the procedure were discussed at length  The patient had cleared his prior COVID infection in October and was deemed optimized and stable for the intended procedure on 1/5/2021  He underwent right total knee arthroplasty on 1/5/2021     Operative Findings:  After completion of the arthrotomy there was evidence of grade 4 changes in all 3 compartments  There was no significant bony loss or erosion  Ultimately a cemented right total knee arthroplasty was successfully implanted    Range of motion was from 0-130 degrees with excellent stability at 0° 30° 90°  Patella tracked midline and flat  After closure of the arthrotomy, range of motion, stability, and patellar tracking were unchanged  Patient tolerated procedure well  There were no complications      Complications:   None    POD#1 Progress Note:   Plan to complete iv ancef , continue prn analgesia  Patient is weight bear as tolerated  PT /OT evaluations today  Rosas discontinued and due to void  Anticipate ed to home today if remains medically stable  Follow up labs  HB 14                Admission Orders: Date/Time/Statement:   Admission Orders (From admission, onward)     Ordered        01/05/21 1439  Inpatient Admission  Once                   Orders Placed This Encounter   Procedures    Inpatient Admission     Standing Status:   Standing     Number of Occurrences:   1     Order Specific Question:   Admitting Physician     Answer:   Milla Coles     Order Specific Question:   Level of Care     Answer:   Med Surg [16]     Order Specific Question:   Estimated length of stay     Answer:   Inpatient Only Surgery     Vital Signs: /97   Pulse 72   Temp 98 6 °F (37 °C)   Resp 18   Ht 6' (1 829 m)   Wt 123 kg (271 lb 8 oz)   SpO2 94%   BMI 36 82 kg/m²        Diet: regular   Mobility:  Ambulate with assist   DVT Prophylaxis:  Foot pumps  Medications/Pain Control: 7/10 post op received po oxycodone prn x 2 and iv dilaudid prn x3      Scheduled Medications:  acetaminophen, 975 mg, Oral, Q8H  aspirin, 325 mg, Oral, BID  celecoxib, 100 mg, Oral, BID  dexamethasone, 10 mg, Intravenous, Once  docusate sodium, 100 mg, Oral, BID  fluticasone, 2 spray, Each Nare, Daily  gabapentin, 200 mg, Oral, BID  pantoprazole, 40 mg, Oral, Daily  scopolamine, 1 patch, Transdermal, Once      Continuous IV Infusions:     PRN Meds:  aluminum-magnesium hydroxide-simethicone, 30 mL, Oral, Q6H PRN  HYDROmorphone, 0 5 mg, Intravenous, Q2H PRN  lactated ringers, 1,000 mL, Intravenous, Once PRN    And  lactated ringers, 1,000 mL, Intravenous, Once PRN  magnesium hydroxide, 30 mL, Oral, Daily PRN  ondansetron, 4 mg, Intravenous, Q6H PRN  oxyCODONE, 10 mg, Oral, Q4H PRN  oxyCODONE, 5 mg, Oral, Q4H PRN  sodium chloride, 1,000 mL, Intravenous, Once PRN    And  sodium chloride, 1,000 mL, Intravenous, Once PRN        Network Utilization Review Department  ATTENTION: Please call with any questions or concerns to [phone] and carefully listen to the prompts so that you are directed to the right person  All voicemails are confidential   Danyelle Crissy all requests for admission clinical reviews, approved or denied determinations and any other requests to dedicated fax number below belonging to the campus where the patient is receiving treatment   List of dedicated fax numbers for the Facilities:  [address] DENIALS (Administrative/Medical Necessity) [phone]   [address] (Maternity/NICU/Pediatrics) [phone]   [address] [place] [address] Dr Jaswinder Purvis [phone] (Shailesh Jones "Samantha" 103) [address] [address] [place] [address] [place] [address] [place] [address] [phone]

## 2021-01-06 NOTE — PHYSICAL THERAPY NOTE
PT TREATMENT     01/06/21 1328   PT Last Visit   PT Visit Date 01/06/21   Note Type   Note Type BID visit/treatment   Pain Assessment   Pain Assessment Tool 0-10   Pain Score 7   Pain Location/Orientation Orientation: Right;Location: Knee   Restrictions/Precautions   RLE Weight Bearing Per Order WBAT   Other Precautions Fall Risk;Bed Alarm; Chair Alarm;Pain   General   Chart Reviewed Yes   Subjective   Subjective Pt states dizzy when standing   Bed Mobility   Supine to Sit 5  Supervision   Additional items Verbal cues   Sit to Supine 5  Supervision   Additional items Verbal cues   Transfers   Sit to Stand 5  Supervision   Additional items Verbal cues  (bed raised)   Stand to Sit 5  Supervision   Additional items Verbal cues   Ambulation/Elevation   Gait Assistance 5  Supervision   Additional items Verbal cues   Assistive Device Rolling walker   Distance Pt stood with RW x 2 mins;pt reports dizziness with standing, BP taken with pt standing - 176/110;pt returned to EOB and sat x 1 min, /101, pt then returned to supine x 1 min, /100  RN notified as well as Dr Marcela Garay  Deferred further gait due to dizziness and increased BP  Balance   Static Sitting Good   Static Standing Fair   Activity Tolerance   Activity Tolerance Patient limited by fatigue;Patient limited by pain;Treatment limited secondary to medical complications (Comment)  (dizziness and increased BP)   Exercises   Quad Sets Right;10 reps   Heelslides AAROM;10 reps;Right   Knee AROM Short Arc Quad AAROM;10 reps;Right   Ankle Pumps 10 reps;Right   Assessment   Assessment Began PT session with RLE ex which pt tolerated well  Pt then trans supine to sit and then to standing with RW with S - pt reports dizziness and BP assessed as noted above  Pt returned to supine in bed due to reports of dizziness and increased BP  RN and Dr Marcela Garay notified  Deferred further attempts at amb due to dizziness and increased BP     Plan   Treatment/Interventions ADL retraining;Functional transfer training;LE strengthening/ROM; Elevations; Therapeutic exercise; Endurance training;Patient/family training;Equipment eval/education; Bed mobility;Gait training; Compensatory technique education   PT Frequency Twice a day   Recommendation   PT Discharge Recommendation Other (Comment)  (OP PT)   Licensure   [place] License Number  Paddy [place], [place] [id number]

## 2021-01-06 NOTE — PHYSICAL THERAPY NOTE
PT TREATMENT     01/06/21 1010   PT Last Visit   PT Visit Date 01/06/21   Note Type   Note Type BID visit/treatment   Pain Assessment   Pain Assessment Tool 0-10   Pain Score 7   Pain Location/Orientation Orientation: Right;Location: Knee   Restrictions/Precautions   RLE Weight Bearing Per Order WBAT   Other Precautions Pain; Fall Risk;Bed Alarm; Chair Alarm  (Dizzy)   General   Chart Reviewed Yes   Subjective   Subjective Pt reports dizziness   Bed Mobility   Supine to Sit 5  Supervision   Additional items Verbal cues   Transfers   Sit to Stand 4  Minimal assistance   Additional items Assist x 1;Verbal cues   Stand to Sit 4  Minimal assistance   Additional items Assist x 1;Verbal cues   Additional Comments Multiple sit to stand transfers due to pt dizzy   Ambulation/Elevation   Gait pattern Antalgic  (decreased heelstrike RLE;amb with flexed knee)   Gait Assistance 4  Minimal assist   Additional items Assist x 1;Verbal cues; Tactile cues   Assistive Device Rolling walker   Distance Few steps bed to chair, pt then became dizzy and sat in chair;rest;10 feet with change in direction and pt became dizzy so pt sat in chair;rest;10 feet with change in direction and again, pt became dizzy and sat in chair  Stair Management Assistance   (min A/S)   Additional items Assist x 1;Verbal cues; Tactile cues   Stair Management Technique Step to pattern; Two rails   Number of Stairs 3   Balance   Static Sitting Good   Static Standing Fair   Dynamic Standing Fair -   Ambulatory Fair -   Activity Tolerance   Activity Tolerance Patient limited by fatigue;Patient limited by pain;Treatment limited secondary to medical complications (Comment)  (dizziness;multiple brief episodes)   Exercises   Knee Flexion Stretch 10 reps;Right   Knee AROM Long Arc Quad AAROM;10 reps;Right   Ankle Pumps 10 reps;Right   Assessment   Assessment Pt with limited tolerance to trans, amb with and mobility with the RW due to multiple, brief episodes of dizziness  When pt not reporting dizziness, pt doing well functionally from and assistance perspective  Pt will cont to benefit from skilled PT services to increase his arom R knee and overall gait and mobility  Plan   Treatment/Interventions ADL retraining;Functional transfer training;LE strengthening/ROM; Elevations; Therapeutic exercise; Endurance training;Patient/family training;Equipment eval/education; Bed mobility;Gait training; Compensatory technique education   PT Frequency Twice a day   Recommendation   PT Discharge Recommendation Other (Comment)  (OP PT)   Equipment Recommended   (pt has a RW and cane)   Licensure   [facility] Number  [place], [place] [id number]

## 2021-01-07 ENCOUNTER — TELEPHONE (OUTPATIENT)
Dept: OBGYN CLINIC | Facility: CLINIC | Age: 61
End: 2021-01-07

## 2021-01-07 ENCOUNTER — TRANSITIONAL CARE MANAGEMENT (OUTPATIENT)
Dept: FAMILY MEDICINE CLINIC | Facility: CLINIC | Age: 61
End: 2021-01-07

## 2021-01-07 ENCOUNTER — TELEPHONE (OUTPATIENT)
Dept: OBGYN CLINIC | Facility: HOSPITAL | Age: 61
End: 2021-01-07

## 2021-01-07 VITALS
BODY MASS INDEX: 36.77 KG/M2 | OXYGEN SATURATION: 94 % | DIASTOLIC BLOOD PRESSURE: 85 MMHG | WEIGHT: 271.5 LBS | HEART RATE: 81 BPM | HEIGHT: 72 IN | RESPIRATION RATE: 16 BRPM | SYSTOLIC BLOOD PRESSURE: 143 MMHG | TEMPERATURE: 98 F

## 2021-01-07 PROBLEM — D72.829 LEUKOCYTOSIS: Status: ACTIVE | Noted: 2021-01-07

## 2021-01-07 LAB
ANION GAP SERPL CALCULATED.3IONS-SCNC: 9 MMOL/L (ref 4–13)
BASOPHILS # BLD AUTO: 0.06 THOUSANDS/ΜL (ref 0–0.1)
BASOPHILS NFR BLD AUTO: 0 % (ref 0–1)
BUN SERPL-MCNC: 13 MG/DL (ref 5–25)
CALCIUM SERPL-MCNC: 8.9 MG/DL (ref 8.3–10.1)
CHLORIDE SERPL-SCNC: 105 MMOL/L (ref 100–108)
CO2 SERPL-SCNC: 26 MMOL/L (ref 21–32)
CREAT SERPL-MCNC: 0.72 MG/DL (ref 0.6–1.3)
EOSINOPHIL # BLD AUTO: 0.01 THOUSAND/ΜL (ref 0–0.61)
EOSINOPHIL NFR BLD AUTO: 0 % (ref 0–6)
ERYTHROCYTE [DISTWIDTH] IN BLOOD BY AUTOMATED COUNT: 12.6 % (ref 11.6–15.1)
GFR SERPL CREATININE-BSD FRML MDRD: 101 ML/MIN/1.73SQ M
GLUCOSE SERPL-MCNC: 112 MG/DL (ref 65–140)
HCT VFR BLD AUTO: 45 % (ref 36.5–49.3)
HGB BLD-MCNC: 14.8 G/DL (ref 12–17)
IMM GRANULOCYTES # BLD AUTO: 0.23 THOUSAND/UL (ref 0–0.2)
IMM GRANULOCYTES NFR BLD AUTO: 1 % (ref 0–2)
LYMPHOCYTES # BLD AUTO: 2.12 THOUSANDS/ΜL (ref 0.6–4.47)
LYMPHOCYTES NFR BLD AUTO: 9 % (ref 14–44)
MCH RBC QN AUTO: 29.3 PG (ref 26.8–34.3)
MCHC RBC AUTO-ENTMCNC: 32.9 G/DL (ref 31.4–37.4)
MCV RBC AUTO: 89 FL (ref 82–98)
MONOCYTES # BLD AUTO: 2.27 THOUSAND/ΜL (ref 0.17–1.22)
MONOCYTES NFR BLD AUTO: 10 % (ref 4–12)
NEUTROPHILS # BLD AUTO: 18.54 THOUSANDS/ΜL (ref 1.85–7.62)
NEUTS SEG NFR BLD AUTO: 80 % (ref 43–75)
NRBC BLD AUTO-RTO: 0 /100 WBCS
PLATELET # BLD AUTO: 262 THOUSANDS/UL (ref 149–390)
PMV BLD AUTO: 9.1 FL (ref 8.9–12.7)
POTASSIUM SERPL-SCNC: 3.9 MMOL/L (ref 3.5–5.3)
RBC # BLD AUTO: 5.05 MILLION/UL (ref 3.88–5.62)
SODIUM SERPL-SCNC: 140 MMOL/L (ref 136–145)
WBC # BLD AUTO: 23.23 THOUSAND/UL (ref 4.31–10.16)

## 2021-01-07 PROCEDURE — 99232 SBSQ HOSP IP/OBS MODERATE 35: CPT | Performed by: INTERNAL MEDICINE

## 2021-01-07 PROCEDURE — 80048 BASIC METABOLIC PNL TOTAL CA: CPT | Performed by: PHYSICIAN ASSISTANT

## 2021-01-07 PROCEDURE — 85025 COMPLETE CBC W/AUTO DIFF WBC: CPT | Performed by: PHYSICIAN ASSISTANT

## 2021-01-07 PROCEDURE — 97116 GAIT TRAINING THERAPY: CPT

## 2021-01-07 PROCEDURE — 97110 THERAPEUTIC EXERCISES: CPT

## 2021-01-07 PROCEDURE — 99024 POSTOP FOLLOW-UP VISIT: CPT | Performed by: ORTHOPAEDIC SURGERY

## 2021-01-07 PROCEDURE — 99024 POSTOP FOLLOW-UP VISIT: CPT | Performed by: PHYSICIAN ASSISTANT

## 2021-01-07 RX ORDER — OXYCODONE HYDROCHLORIDE 5 MG/1
TABLET ORAL
Qty: 45 TABLET | Refills: 0 | Status: SHIPPED | OUTPATIENT
Start: 2021-01-07 | End: 2021-01-07 | Stop reason: SDUPTHER

## 2021-01-07 RX ORDER — DOCUSATE SODIUM 100 MG/1
100 CAPSULE, LIQUID FILLED ORAL 2 TIMES DAILY
Qty: 60 CAPSULE | Refills: 0 | Status: SHIPPED | OUTPATIENT
Start: 2021-01-07 | End: 2021-03-31 | Stop reason: ALTCHOICE

## 2021-01-07 RX ORDER — AMLODIPINE BESYLATE 10 MG/1
10 TABLET ORAL DAILY
Qty: 30 TABLET | Refills: 0 | Status: SHIPPED | OUTPATIENT
Start: 2021-01-07 | End: 2021-01-15 | Stop reason: SDUPTHER

## 2021-01-07 RX ORDER — ASPIRIN 325 MG
325 TABLET ORAL 2 TIMES DAILY
Qty: 82 TABLET | Refills: 0 | Status: SHIPPED | OUTPATIENT
Start: 2021-01-07 | End: 2021-03-31 | Stop reason: ALTCHOICE

## 2021-01-07 RX ORDER — OXYCODONE HYDROCHLORIDE 5 MG/1
5 TABLET ORAL EVERY 4 HOURS PRN
Qty: 30 TABLET | Refills: 0 | Status: SHIPPED | OUTPATIENT
Start: 2021-01-07 | End: 2021-01-20 | Stop reason: SDUPTHER

## 2021-01-07 RX ORDER — ACETAMINOPHEN 325 MG/1
975 TABLET ORAL EVERY 8 HOURS
Refills: 0
Start: 2021-01-07 | End: 2021-03-31 | Stop reason: ALTCHOICE

## 2021-01-07 RX ORDER — CELECOXIB 100 MG/1
100 CAPSULE ORAL 2 TIMES DAILY
Qty: 28 CAPSULE | Refills: 0 | Status: SHIPPED | OUTPATIENT
Start: 2021-01-07 | End: 2021-03-31 | Stop reason: ALTCHOICE

## 2021-01-07 RX ADMIN — GABAPENTIN 200 MG: 100 CAPSULE ORAL at 09:06

## 2021-01-07 RX ADMIN — ACETAMINOPHEN 975 MG: 325 TABLET, FILM COATED ORAL at 09:10

## 2021-01-07 RX ADMIN — OXYCODONE HYDROCHLORIDE 5 MG: 5 TABLET ORAL at 06:24

## 2021-01-07 RX ADMIN — OXYCODONE HYDROCHLORIDE 5 MG: 5 TABLET ORAL at 10:52

## 2021-01-07 RX ADMIN — FLUTICASONE PROPIONATE 2 SPRAY: 50 SPRAY, METERED NASAL at 09:09

## 2021-01-07 RX ADMIN — CELECOXIB 100 MG: 100 CAPSULE ORAL at 09:06

## 2021-01-07 RX ADMIN — ACETAMINOPHEN 975 MG: 325 TABLET, FILM COATED ORAL at 00:18

## 2021-01-07 RX ADMIN — DOCUSATE SODIUM 100 MG: 100 CAPSULE, LIQUID FILLED ORAL at 09:09

## 2021-01-07 RX ADMIN — ASPIRIN 325 MG: 325 TABLET, FILM COATED ORAL at 09:06

## 2021-01-07 RX ADMIN — PANTOPRAZOLE SODIUM 40 MG: 40 TABLET, DELAYED RELEASE ORAL at 09:07

## 2021-01-07 RX ADMIN — AMLODIPINE BESYLATE 10 MG: 10 TABLET ORAL at 09:06

## 2021-01-07 NOTE — PROGRESS NOTES
[address] Internal Medicine Progress Note  Patient: Jacque Shrestha 61 y o  male   MRN: [id number]  PCP: Ramona Munguia DO  Unit/Bed#: [address] [place] [address] Encounter: [id number]  Date Of Visit: 01/07/21    Problem List:    Principal Problem:    S/P total knee replacement using cement, right  Active Problems:    Benign essential hypertension    Leukocytosis    Obesity      Assessment & Plan:    Benign essential hypertension  Assessment & Plan  Previous history of hypertension, subsequently improved   Patient formally on norvasc and olmesartan  · Reported significant pain yesterday likely contributed to elevated blood pressure  · Blood pressure is significantly better now after starting amlodipine  · Continue amlodipine now  · Will recommend to continue monitoring of the blood pressure  · Follow-up PCP    Leukocytosis  Assessment & Plan  No clinical evidence of infection  Likely secondary to steroids  Observe    Obesity  Assessment & Plan  Dietary and lifestyle modifications     * S/P total knee replacement using cement, right  Assessment & Plan  Patient post elective knee replacement, 1/5  · DVT prophylaxis/post operative management per primary team  · Continue PT/OT, incentive spirometry  · Optimize pain control          VTE Pharmacologic Prophylaxis:   Pharmacologic: ASA BID  Mechanical VTE Prophylaxis in Place: Yes    Patient Centered Rounds: I have performed bedside rounds with nursing staff today  Discussions with Specialists or Other Care Team Provider:  Yes    Education and Discussions with Family / Patient:  Yes, discussed with patient    Time Spent for Care: 30 minutes  More than 50% of total time spent on counseling and coordination of care as described above  Current Length of Stay: 2 day(s)    Current Patient Status: Inpatient     Discharge Plan:  Continue current antihypertensive at discharge  Prescription sent      Code Status: Level 1 - Full Code      Subjective:   Noted to be ambulating in the room with walker  Denies any chest pain shortness of breath dizziness or palpitation  Still with postoperative pain but slowly improving  Reports feeling much better today  Denies any cough, urinary problems  Reports history of hypertension in the past   Reports that he was having significant pain yesterday with physical therapy which may have contributed to elevated blood pressure  Objective:     Vitals:   Temp (24hrs), Av 2 °F (36 8 °C), Min:97 3 °F (36 3 °C), Max:98 9 °F (37 2 °C)    Temp:  [97 3 °F (36 3 °C)-98 9 °F (37 2 °C)] 98 °F (36 7 °C)  HR:  [70-89] 81  Resp:  [16-20] 16  BP: (138-184)/() 143/85  SpO2:  [91 %-96 %] 94 %  Body mass index is 36 82 kg/m²  Input and Output Summary (last 24 hours): Intake/Output Summary (Last 24 hours) at 2021 1127  Last data filed at 2021 0500  Gross per 24 hour   Intake 1230 ml   Output    Net 1230 ml       Physical Exam:     Physical Exam  Constitutional:       General: He is not in acute distress  HENT:      Head: Normocephalic and atraumatic  Neck:      Musculoskeletal: Normal range of motion and neck supple  Cardiovascular:      Rate and Rhythm: Normal rate  Pulmonary:      Effort: Pulmonary effort is normal  No respiratory distress  Breath sounds: No wheezing, rhonchi or rales  Chest:      Chest wall: No tenderness  Abdominal:      General: Bowel sounds are normal  There is no distension  Palpations: Abdomen is soft  Tenderness: There is no abdominal tenderness  There is no guarding or rebound  Musculoskeletal: Normal range of motion  Comments: Right knee dressing in place  Skin:     General: Skin is warm and dry  Findings: No rash  Neurological:      General: No focal deficit present  Mental Status: He is alert and oriented to person, place, and time  Mental status is at baseline  Cranial Nerves: No cranial nerve deficit     Psychiatric:         Mood and Affect: Mood normal  Additional Data:     Labs:    Results from last 7 days   Lab Units 01/07/21  0527   WBC Thousand/uL 23 23*   HEMOGLOBIN g/dL 14 8   HEMATOCRIT % 45 0   PLATELETS Thousands/uL 262   NEUTROS PCT % 80*   LYMPHS PCT % 9*   MONOS PCT % 10   EOS PCT % 0     Results from last 7 days   Lab Units 01/07/21  0527   POTASSIUM mmol/L 3 9   CHLORIDE mmol/L 105   CO2 mmol/L 26   BUN mg/dL 13   CREATININE mg/dL 0 72   CALCIUM mg/dL 8 9           * I Have Reviewed All Lab Data Listed Above  * Additional Pertinent Lab Tests Reviewed:  All Labs Within Last 24 Hours Reviewed      Imaging:  Imaging Reports Reviewed Today Include:  Yes, x-ray  Recent Cultures (last 7 days):           Last 24 Hours Medication List:   Current Facility-Administered Medications   Medication Dose Route Frequency Provider Last Rate    acetaminophen  975 mg Oral Q8H Blenda Luis, PA-C      aluminum-magnesium hydroxide-simethicone  30 mL Oral Q6H PRN Blenda Luis, PA-C      amLODIPine  10 mg Oral Daily Brasher Ala, CRNP      aspirin  325 mg Oral BID Blenda Luis, PA-C      celecoxib  100 mg Oral BID Blenda Luis, PA-C      docusate sodium  100 mg Oral BID Blenda Luis, PA-C      fluticasone  2 spray Each Nare Daily Judya Luis, PA-C      gabapentin  200 mg Oral BID Blenda Luis, PA-C      hydrALAZINE  5 mg Intravenous Q6H PRN Brasher Ala, CRNP      HYDROmorphone  0 5 mg Intravenous Q2H PRN Blenda Luis, PA-C      magnesium hydroxide  30 mL Oral Daily PRN Blenda Luis, PA-C      ondansetron  4 mg Intravenous Q6H PRN Blenda Luis, PA-C      oxyCODONE  10 mg Oral Q4H PRN Blenda Luis, PA-C      oxyCODONE  5 mg Oral Q4H PRN Blenda Luis, PA-C      pantoprazole  40 mg Oral Daily Blenda Luis, PA-C      scopolamine  1 patch Transdermal Once Judya Luis, PA-C            Today, Patient Was Seen By: Jasmin Estevez MD    ** Please Note: "This note has been constructed using a voice recognition system  Therefore there may be syntax, spelling, and/or grammatical errors   Please call if you have any questions  "**

## 2021-01-07 NOTE — PHYSICAL THERAPY NOTE
PT TREATMENT     01/07/21 1000   PT Last Visit   PT Visit Date 01/07/21   Note Type   Note Type BID visit/treatment   Pain Assessment   Pain Assessment Tool 0-10   Pain Score 7   Pain Location/Orientation Orientation: Right;Location: Knee   Restrictions/Precautions   RLE Weight Bearing Per Order WBAT   Other Precautions Fall Risk;Pain   General   Chart Reviewed Yes   Subjective   Subjective "much better"   Bed Mobility   Supine to Sit 7  Independent   Sit to Supine 7  Independent   Transfers   Sit to Stand 7  Independent   Stand to Sit 7  Independent   Ambulation/Elevation   Gait pattern Antalgic   Gait Assistance 7  Independent   Assistive Device Rolling walker   Distance 200 feet with change in direction   Stair Management Assistance 7  Independent   Stair Management Technique Step to pattern; One rail R;With cane   Number of Stairs 4   Balance   Static Sitting Good   Static Standing Fair +   Dynamic Standing Fair   Ambulatory Fair   Activity Tolerance   Activity Tolerance Patient limited by fatigue;Patient limited by pain   Exercises   Quad Sets 10 reps;Right   Knee Flexion Stretch 10 reps;Right   Knee AROM Long Arc Quad AAROM;10 reps;Right   Assessment   Assessment Significant improvement noted in bed mob, trans, amb with RW, gait on stairs, balance and tolerance to RLE ex  Pt is safe for dc home today  Plan   Treatment/Interventions ADL retraining;Functional transfer training;LE strengthening/ROM; Elevations; Therapeutic exercise; Endurance training;Patient/family training;Equipment eval/education; Bed mobility;Gait training; Compensatory technique education   PT Frequency Twice a day   Recommendation   PT Discharge Recommendation Other (Comment)  (OP PT)   Licensure   [place] License Number  Paddy [place], [place] [id number]

## 2021-01-07 NOTE — ASSESSMENT & PLAN NOTE
Patient with history of hypertension, stopped home medications  Patient formally on norvasc and olmesartan  Intermittently checks BP at home, usually above   Resume norvasc, add hydralazine PRN

## 2021-01-07 NOTE — TELEPHONE ENCOUNTER
Jeffrey Hunt is calling from [facility] in reference to the order for oxyCODONE (ROXICODONE) 5 mg immediate release tablet [[id number]]     Initial prescription cannot be for 1-2 tablets every 4-6  This needs to be revised to read 1 tablet every four to six hours with a max of a five day supply    Please send revised order to [facility] in [place]

## 2021-01-07 NOTE — CONSULTS
Consult- George Shetty 1960, 61 y o  male MRN: [id number]    Unit/Bed#: [address] [place] [address] Encounter: [id number]    Primary Care Provider: Dm Jiménez DO   Date and time admitted to hospital: 1/5/2021  9:30 AM      Inpatient consult to Internal Medicine  Consult performed by: BRIJESH Mejia  Consult ordered by: Jessica Tolliver PA-C          * S/P total knee replacement using cement, right  Assessment & Plan  Patient post elective knee replacement  DVT prophylaxis/post operative management per primary team      Benign essential hypertension  Assessment & Plan  Patient with history of hypertension, stopped home medications  Patient formally on norvasc and olmesartan  Intermittently checks BP at home, usually above   Resume norvasc, add hydralazine PRN     Obesity  Assessment & Plan  Dietary and lifestyle modifications         PCP: Dm Jiménez DO  Date of Admission:  1/5/2021  Date of Consultation: 01/06/21  Requesting Physician: Ginger Mendoza DO    Reason For Consultation:     Hypertension   of Present Illness:    George Shetty is a 61 y o  male with a PMH of osteoarthritis, hypertension who is originally admitted to the surgical service on 1/5/2021 due to osteoarthritis for elective knee replacement  We are consulted for management of hypertension  Patient has hypertension at baseline, has been off medications  He intermittently monitors it at home and it is usually in the 140s  He stopped his home medications  Post operatively, patient is hypertensive  He reports feeling well - denies any chest pain, shortness of breath, headache, dizziness  He reports pain is well controlled  Review of Systems:    Review of Systems   Constitutional: Negative  HENT: Negative  Eyes: Negative  Cardiovascular: Negative  Gastrointestinal: Negative  Endocrine: Negative  Genitourinary: Negative  Musculoskeletal:        Knee pain   Skin: Negative      Allergic/Immunologic: Negative  Neurological: Negative  Hematological: Negative  Psychiatric/Behavioral: Negative  Past Medical and Surgical History:     Past Medical History:   Diagnosis Date    Ankle edema 2015    Arthritis     Benign essential hypertension     COVID-19     2020    Obesity     Organic impotence     Seasonal allergies        Past Surgical History:   Procedure Laterality Date    JOINT REPLACEMENT Right 2021    right knee    AR TOTAL KNEE ARTHROPLASTY Right 2021    Procedure: ARTHROPLASTY KNEE TOTAL;  Surgeon: Alen Rodriguez DO;  Location: [address] [place] [address];  Service: Orthopedics   [facility] MSK PROCEDURE  2020       Meds/Allergies:    all medications and allergies reviewed    Allergies: No Known Allergies  History:     Marital Status: /Civil Union    Substance Use History:   Social History     Substance and Sexual Activity   Alcohol Use Never    Alcohol/week: 0 0 standard drinks    Frequency: Never    Binge frequency: Never     Social History     Tobacco Use   Smoking Status Former Smoker    Quit date: 1970    Years since quittin 4   Smokeless Tobacco Never Used     Social History     Substance and Sexual Activity   Drug Use Never       Family History:    Family History   Problem Relation Age of Onset    Alzheimer's disease Mother     Cancer Mother        Physical Exam:     Vitals:   Blood Pressure: (!) 176/101 (21)  Pulse: 89 (21)  Temperature: 98 9 °F (37 2 °C) (21)  Temp Source: Oral (21 0347)  Respirations: 18 (21)  Height: 6' (182 9 cm) (21 1625)  Weight - Scale: 123 kg (271 lb 8 oz) (21 0956)  SpO2: 95 % (21)    Physical Exam  Vitals signs and nursing note reviewed  Constitutional:       Appearance: Normal appearance  HENT:      Head: Normocephalic and atraumatic        Nose: Nose normal       Mouth/Throat:      Mouth: Mucous membranes are moist  Eyes:      Extraocular Movements: Extraocular movements intact  Cardiovascular:      Rate and Rhythm: Normal rate and regular rhythm  Pulses: Normal pulses  Heart sounds: No murmur  Pulmonary:      Effort: Pulmonary effort is normal       Breath sounds: Normal breath sounds  Abdominal:      General: Abdomen is flat  Bowel sounds are normal       Palpations: Abdomen is soft  Musculoskeletal: Normal range of motion  General: No swelling  Comments: RLE dressing CDI  2+ pedal pulses   Skin:     General: Skin is warm and dry  Neurological:      General: No focal deficit present  Mental Status: He is alert and oriented to person, place, and time  Psychiatric:         Mood and Affect: Mood normal          Behavior: Behavior normal              Lab Results: I Have Reviewed All Lab Data Below:    Results from last 7 days   Lab Units 01/06/21  0552   WBC Thousand/uL 16 91*   HEMOGLOBIN g/dL 14 8   PLATELETS Thousands/uL 266     Results from last 7 days   Lab Units 01/06/21  0552   SODIUM mmol/L 137   POTASSIUM mmol/L 4 8   CHLORIDE mmol/L 103   CO2 mmol/L 30   BUN mg/dL 12   CREATININE mg/dL 0 83   CALCIUM mg/dL 8 4             Lab Results   Component Value Date/Time    HGBA1C 5 2 10/30/2020 08:32 AM               Blood Culture: No results found for: BLOODCX  Urine Culture: No results found for: URINECX  Sputum Culture: No components found for: SPUTUMCX  Wound Culture: No results found for: [facility]        * Additional Pertinent Lab Tests Reviewed: [facility] [address] Admission Reviewed    Imaging: I have personally reviewed pertinent reports  XR knee right 1 or 2 views   Final Result by Mohamud Garcia DO (01/05 7935)      Unremarkable appearance of total knee arthroplasty  Workstation performed: [id number]               VTE Prophylaxis: per primary team       Counseling / Coordination of Care Time: 15 minutes    Greater than 50% of total time spent on patient counseling and coordination of care  Collaboration of Care:  Were Recommendations Directly Discussed with Primary Treatment Team? - Yes     ** Please Note: [employer] Dictation speech to text software may have been used in the creation of this document **

## 2021-01-07 NOTE — ASSESSMENT & PLAN NOTE
Patient post elective knee replacement, 1/5  · DVT prophylaxis/post operative management per primary team  · Continue PT/OT, incentive spirometry  · Optimize pain control

## 2021-01-07 NOTE — PROGRESS NOTES
Progress Note - Orthopedics   Darci Pair 61 y o  male MRN: [id number]  Unit/Bed#: [address] [address] Encounter: [id number]    Assessment:  1) POD#2 s/p Right TKA    Plan:  Perioperative antibiotics completed  DVT prophylaxis: ASA 325mg PO BID/SCD's/Ambulation  PT/OT- WBAT RLE  Analgesia PRN  Follow up AM labs - H/H/BUN/Cr/gfr stable, no evidence hematoma, leukocytosis likely reactive nature to surgery and administration steroids intra and postoperatively; no concern for infection  Rosas DC - monitor for void  Elevated blood pressure - appreciate medicine team consult and input  Dressing- monitor for drainage, Mepilex may remain in place 7 days  Discharge planning - disposition pending progress with PT postoperatively and control of BP, plan for DC home today to begin outpatient PT later this week    Weight bearing: WBAT RLE    VTE Pharmacologic Prophylaxis: ASA 325mg PO BID  VTE Mechanical Prophylaxis: sequential compression device    Subjective:  Patient seen and examined in bed this morning  Pain controlled  Medicine team was consulted last night for postoperative hypertension  They restarted a medication that he was previously on, blood pressures have improved  He remains asymptomatic  Able work with physical therapy yesterday  Denies chest pain, shortness of breath, dizziness, lightheadedness, or paresthesias    Vitals: Blood pressure 152/89, pulse 79, temperature 98 °F (36 7 °C), resp  rate 16, height 6' (1 829 m), weight 123 kg (271 lb 8 oz), SpO2 96 %  ,Body mass index is 36 82 kg/m²        Intake/Output Summary (Last 24 hours) at 1/7/2021 0716  Last data filed at 1/7/2021 0500  Gross per 24 hour   Intake 1230 ml   Output    Net 1230 ml       Invasive Devices     Peripheral Intravenous Line            Peripheral IV 01/06/21 Left;Dorsal (posterior) Hand less than 1 day                Physical Exam: NAD, A&Ox3, resting comfortably in bed  Ortho Exam: RLE: right anterior knee dsg c/d/i, compartments soft, calf non-tender, +PF/DF/EHL, +DP/SP/Saph/Sural SILT, DP 2+, foot warm, TEDs/foot pumps in place    Lab, Imaging and other studies:     Lab Results   Component Value Date    WBC 23 23 (H) 01/07/2021    HGB 14 8 01/07/2021    HCT 45 0 01/07/2021    MCV 89 01/07/2021     01/07/2021     Lab Results   Component Value Date     11/10/2016    SODIUM 140 01/07/2021    K 3 9 01/07/2021     01/07/2021    CO2 26 01/07/2021    AGAP 9 01/07/2021    BUN 13 01/07/2021    CREATININE 0 72 01/07/2021    GLUC 112 01/07/2021    GLUF 96 12/24/2020    CALCIUM 8 9 01/07/2021    AST 18 12/24/2020    ALT 31 12/24/2020    ALKPHOS 79 12/24/2020    PROT 7 1 11/10/2016    TP 8 0 12/24/2020    BILITOT 0 6 11/10/2016    TBILI 1 00 12/24/2020    EGFR 101 01/07/2021     Isabel Buchanan PA-C

## 2021-01-07 NOTE — ASSESSMENT & PLAN NOTE
Previous history of hypertension, subsequently improved   Patient formally on norvasc and olmesartan  · Reported significant pain yesterday likely contributed to elevated blood pressure  · Blood pressure is significantly better now after starting amlodipine  · Continue amlodipine now  · Will recommend to continue monitoring of the blood pressure  · Follow-up PCP

## 2021-01-08 ENCOUNTER — EVALUATION (OUTPATIENT)
Dept: PHYSICAL THERAPY | Facility: CLINIC | Age: 61
End: 2021-01-08
Payer: COMMERCIAL

## 2021-01-08 ENCOUNTER — TELEPHONE (OUTPATIENT)
Dept: OBGYN CLINIC | Facility: HOSPITAL | Age: 61
End: 2021-01-08

## 2021-01-08 DIAGNOSIS — Z96.651 STATUS POST TOTAL KNEE REPLACEMENT, RIGHT: Primary | ICD-10-CM

## 2021-01-08 DIAGNOSIS — M25.561 ACUTE PAIN OF RIGHT KNEE: ICD-10-CM

## 2021-01-08 LAB
ATRIAL RATE: 75 BPM
P AXIS: 31 DEGREES
PR INTERVAL: 160 MS
QRS AXIS: -2 DEGREES
QRSD INTERVAL: 96 MS
QT INTERVAL: 384 MS
QTC INTERVAL: 428 MS
T WAVE AXIS: 4 DEGREES
VENTRICULAR RATE: 75 BPM

## 2021-01-08 PROCEDURE — 93010 ELECTROCARDIOGRAM REPORT: CPT | Performed by: INTERNAL MEDICINE

## 2021-01-08 PROCEDURE — 97110 THERAPEUTIC EXERCISES: CPT

## 2021-01-08 NOTE — TELEPHONE ENCOUNTER
Pt contacted to complete a postoperative follow up call assessment  Pt reports doing "just fine" and that he had "PT today, got good movement " Pt reports current 6/10 pain and reports PT went "pretty good, going smooth "    Pts AVS and AVS medication list were reviewed  Pt reports taking tylenol 975mg TID and celebrex 100mg BID for pain at this time  Pt does not yet have the oxycodone, reports family is going to pick it up currently  We reviewed per AVS it is 5mg every 4 hours as needed for pain  Pt confirmed taking the Colace BID, reports having "a couple" bowel movements yesterday and denies any nausea, vomiting or abdominal pain  The patient also confirmed taking ASA 325mg BID to prevent blood clots  Pt confirmed he is ambulating with the RW and doing "okay, no problem at all " Pt reports swelling is "the same" and that he has been icing  He reports dressing is dry and denies any drainage, he reports he showered last evening and did well doing so  Pt denies any chest pain, SOB, dizziness, fever or calf pain  He is aware of his f/u with the surgeon on 1/20 and was advised to contact our team before then with questions or issues

## 2021-01-08 NOTE — DISCHARGE SUMMARY
Discharge Summary - Earlene Lay 61 y o  male MRN: [id number]    Unit/Bed#: [address] [address] Encounter: [id number]    Admission Date:   Admission Orders (From admission, onward)     Ordered        01/05/21 1439  Inpatient Admission  Once                     Admitting Diagnosis: Primary osteoarthritis of right knee [M17 11]  Chronic pain of right knee [M25 561, G89 29]    HPI:  Patient is a very pleasant 80-year-old gentleman well to our practice for activity related bilateral knee pain due his severe underlying osteoarthritis  His right knee be more symptomatic than his left and failed all forms of conservative treatment  After discussing risks and benefits, consents were signed and placed in the chart for a right total knee arthroplasty  He did receive clearance for the procedure from his primary care physician and a cardiologist   His initial surgical date had to be postponed due to a positive COVID swab, but he was seen and cleared again by his primary care physician in deemed medically suitable for the operation on 01/05/2021  Procedures Performed:  Right total knee arthroplasty performed by Dr Richard Gomes on 1/5/21  No intraoperative complications  Please see operative report full details    Summary of Hospital Course:  Patient underwent a right total knee arthroplasty using cement with Dr Richard Gomes 01/05/2021  He underwent spinal anesthesia with a postoperative adductor canal block in the PACU, which she tolerated well without difficulty or complication  He was transferred to the PACU and subsequently the floor in stable condition  Was able to work with physical therapy on postoperative day 0  He did not have any overnight events  On postoperative day 1, the Rosas catheter was removed and he was able to void without difficulty or complication for the remainder of his stay  His laboratory values and vital signs were monitored, and he did have consistent hypertension  He was asymptomatic    He was able to work with physical therapy in the morning and afternoon and made tremendous gains in his overall comfort and function  However, repeat evaluation of his vital signs revealed persistent asymptomatic hypertension  The decision was made to monitor him overnight and consult the internal medicine team   The internal medicine team did recommend re-initiation of his previously stopped hypertensive medications  With this adjustment and therapy, his blood pressure significantly improved  He did not have any overnight events  On postoperative day 2, his vital signs and laboratory values were again monitored and remained within the normal expected postoperative limits  He did very well with physical therapy in the morning  After discussion with the internal medicine team, physical therapy team, and the patient, it was determined that he was safe to discharge home in the afternoon of postoperative day 2  All discharge instructions and patient questions were discussed in detail at bedside prior to discharge he will begin outpatient physical therapy later this week and follow-up with Dr Slava Cobb in 2 weeks  Significant Findings, Care, Treatment and Services Provided:  Right total knee arthroplasty using cement on 16/05/9753    Complications: None    Discharge Diagnosis:  Status post right total knee arthroplasty using cement    Resolved Problems  Date Reviewed: 1/8/2021    None          Condition at Discharge: stable         Discharge instructions/Information to patient and family:   See after visit summary for information provided to patient and family  Provisions for Follow-Up Care:  See after visit summary for information related to follow-up care and any pertinent home health orders  PCP: Susan Rivera DO    Disposition: Home to begin outpatient physical therapy later this week    Planned Readmission: No      Discharge Statement   I spent 30 minutes discharging the patient   This time was spent on the day of discharge  I had direct contact with the patient on the day of discharge  Additional documentation is required if more than 30 minutes were spent on discharge  Discharge Medications:  See after visit summary for reconciled discharge medications provided to patient and family

## 2021-01-08 NOTE — PROGRESS NOTES
PT Evaluation     Today's date: 2021  Patient name: Khari Vásquez  : 1960  MRN: [id number]  Referring provider: Noemy Joseph DO  Dx:   Encounter Diagnosis     ICD-10-CM    1  Status post total knee replacement, right  Z96 651    2  Acute pain of right knee  M25 561        Start Time: 1015  Stop Time: 1100  Total time in clinic (min): 45 minutes    Assessment  Assessment details: Khari Vásquez is a 61y o  year old male reports to physical therapy with symptoms consistent with referring diagnosis of: status post R TKA on 2021, and right knee pain  Patient is ambulating with a RW, demonstrating proper heel strike and stride length  Decreased gait speed noted due to pain in R knee  Patient is a , and wishes to return to playing tennis and working out at [employer]  Patient demonstrates limitations in R knee ROM, strength, and functional mobility  Patient is limited in the following functional activities: ascending/descending stairs, sleeping through the night, and ambulating greater than 15 minutes  FOTO score to be taken next visit  Patient requires skilled physical therapy to restore prior level of function, address functional limitations, and progress towards independence with home exercise program  Patient was educated on the following: home preparation checklist, home exercise program, stair training, car transfers, DVT/infection prevention, and assistive device sizing instruction  Patient demonstrated, verbalized, and signed off on all above mentioned instructions  Patient was provided with a copy of packet regarding their post-operative care for his right total knee replacement  Symptom irritability: low  Goals  STGs to be achieved in 4 weeks  -STG 1: Patient will be independent with HEP    -STG 2: Patient will have 0/10 R knee pain at rest    -STG 3: Patient will increase R knee ROM to within normal limits     -STG 4: Patient will report 40% functional improvement  -STG 5: Patient will demonstrate 1/2 grade MMT improvement in R knee  -STG 6: Patient will be able to ambulate for greater than 15 minutes with no pain  -STG 7: Patient will ascend/descend stairs reciprocally with no R knee pain  LTGs to be achieved in 8 weeks  -LTG 1: Patient will demonstrate independence with maintenance program    -LTG 2: Patient will perform all functional activities with less than 2/10 R knee pain pain  -LTG 3: Patient will improve FOTO score by 10 points  -LTG 4: Patient will report 80% functional improvement  -LTG 5: Patient will be able to ambulate for greater than 60 minutes with no pain  -LTG 6: Patient will demonstrate 1 grade MMT improvement in R knee  -LTG 7: Patient will get into truck to return to work  Plan  Plan details: Patient treated pro jose juan today due to inactive insurance  Scheduled next week  Patient will call regarding insurance next week  Patient would benefit from: PT eval and skilled physical therapy  Planned modality interventions: manual electrical stimulation, TENS, thermotherapy: hydrocollator packs, electrical stimulation/Russian stimulation, cryotherapy, traction and ultrasound  Planned therapy interventions: manual therapy, massage, neuromuscular re-education, patient education, postural training, balance, strengthening, stretching, therapeutic activities, therapeutic exercise, therapeutic training, flexibility, functional ROM exercises, gait training, graded activity, graded exercise, graded motor and home exercise program  Frequency: 3x week  Duration in weeks: 6  Treatment plan discussed with: patient        Subjective Evaluation    History of Present Illness  Date of surgery: 1/5/2021  Mechanism of injury: surgery  Mechanism of injury: Patient reports to skilled PT S/P R TKA on 1/5/2021  Patient reports severe pain last night in R knee, as he was unable to  prescription medication yet   He is ambulating with a RW at this time  He is a , and wishes to return to work in the next several months  He is required to climb a set of 5 steps to reach his truck  At home, he is ambulating with his RW and SPC at times  He notes difficulties with ambulating greater than 15 minutes, sleeping through the night due to pain, and ascending/descending stairs reciprocally  Patient would like to return to playing tennis and working out at the gym after his rehabilitation is over  Pain  Current pain ratin  At best pain ratin  At worst pain ratin  Quality: sharp  Relieving factors: ice and medications  Aggravating factors: walking, standing and running  Progression: no change    Social Support  Steps to enter house: yes  5  Stairs in house: yes   13  Lives in: multiple-level home  Lives with: alone    Employment status: working  Treatments  Current treatment: medication  Patient Goals  Patient goals for therapy: increased strength, return to work, decreased pain and increased motion  Patient goal: Return to playing tennis and getting into truck for work  Objective     Observations     Right Knee   Positive for incision  Additional Observation Details  Silver dressing on R incision  No signs of drainage, infection  Tenderness     Right Knee   Tenderness in the ITB, patellar tendon and quadriceps tendon  Neurological Testing     Sensation     Knee   Left Knee   Intact: light touch    Right Knee   Intact: light touch     Active Range of Motion   Left Knee   Normal active range of motion    Right Knee   Flexion: 100 degrees   Extension: 8 degrees     Passive Range of Motion   Left Knee   Normal passive range of motion    Right Knee   Flexion: 100 degrees   Extension: 8 degrees     Strength/Myotome Testing     Left Knee   Normal strength    Right Knee   Flexion: 3+  Extension: 3+    Tests     Additional Tests Details  No special tests performed, as ortho note reviewed prior to initial evaluation  Ambulation     Comments   Ambulating with RW  Demonstrates R heel strike with appropriate toe off  Limited R TKE due to lack of ROM       General Comments:      Knee Comments  TUG pre-op: 17 87 sec   TUG post-op: 20 4 sec             Precautions: Standard, R TKA 1/5/2021      Manuals 1/8       R knee PROM        STM R knee 5'               R knee AAROM 8-100 deg       TUG 20 4 sec                       Neuro Re-Ed        Glute set x10       Quad set  x10       Ankle pumps Rev               Ther Ex        NuStep warmup        SLR  2x5       SAQ 2x5       LAQ x10       Heel slides Peanut x10       Hip abd slides Rev                       Ther Activity                        Gait Training        Gait training with RW, device fitting 5'               Modalities        Ice post 10' ant, post R knee

## 2021-01-11 ENCOUNTER — TELEPHONE (OUTPATIENT)
Dept: OBGYN CLINIC | Facility: MEDICAL CENTER | Age: 61
End: 2021-01-11

## 2021-01-11 ENCOUNTER — TELEPHONE (OUTPATIENT)
Dept: OBGYN CLINIC | Facility: HOSPITAL | Age: 61
End: 2021-01-11

## 2021-01-11 NOTE — TELEPHONE ENCOUNTER
[phone]    Patient is asking where he can get another compression stocking that he received in hospital

## 2021-01-11 NOTE — TELEPHONE ENCOUNTER
Patient sees Dr Margarito Forrester  Patient is looking to  new compression stocking, he is asking where he can get these and what size does he look for        Asking for a call back [phone]

## 2021-01-11 NOTE — TELEPHONE ENCOUNTER
Those are hospital issued and unlikely to be found at a store  He can check either [employer] or any local durable medical equipment stores for thigh-high compression stockings

## 2021-01-12 ENCOUNTER — TELEPHONE (OUTPATIENT)
Dept: OBGYN CLINIC | Facility: CLINIC | Age: 61
End: 2021-01-12

## 2021-01-12 ENCOUNTER — OFFICE VISIT (OUTPATIENT)
Dept: PHYSICAL THERAPY | Facility: CLINIC | Age: 61
End: 2021-01-12
Payer: COMMERCIAL

## 2021-01-12 DIAGNOSIS — Z96.651 STATUS POST TOTAL KNEE REPLACEMENT, RIGHT: Primary | ICD-10-CM

## 2021-01-12 DIAGNOSIS — M25.561 ACUTE PAIN OF RIGHT KNEE: ICD-10-CM

## 2021-01-12 PROCEDURE — 97110 THERAPEUTIC EXERCISES: CPT

## 2021-01-12 PROCEDURE — 97140 MANUAL THERAPY 1/> REGIONS: CPT

## 2021-01-12 NOTE — TELEPHONE ENCOUNTER
PT did come over to the office to make us aware that patient was c/o bloody stool  I did advise that this can be normal after post op usually because the patient is straining themselves  I advised that if this does continue to contact his PCP  PT also wanted to make us aware that the patient's insurnace is still showing in active and today is is second no charge visit because he is unable to afford the copay

## 2021-01-12 NOTE — PROGRESS NOTES
Daily Note     Today's date: 2021  Patient name: Junior Dawson  : 1960  MRN: [id number]  Referring provider: Dave Michelle DO  Dx:   Encounter Diagnosis     ICD-10-CM    1  Status post total knee replacement, right  Z96 651    2  Acute pain of right knee  M25 561        Start Time: 09  Stop Time: 1015  Total time in clinic (min): 45 minutes    Subjective: Patient reports 6/10 R knee pain today  He reports blood in stool today  He is concerned about the blood in his stool  He reports wanting to sleep all day yesterday, as his pain was increased  He is ambulating with his RW today and wearing his stocking  Able to ascend/descend stairs non-reciprocally at this time  Performing self care independently  Objective: See treatment diary below   Silver dressing removed  Incision intact with no signs of drainage or infection  Assessment: Spoke with Michael Ross MA, who works with referring surgeon Dr Paddy Tyler, regarding blood in stool  MA states that he is to contact PCP if blood persists  Silver dressing removed during treatment session  No drainage or infection noted  Educated on signs and symptoms of DVT and infection  Encouraged to avoid getting incision wet until MD appointment  Verbalized understanding  Patient encouraged to continue to wear stocking throughout the day to decrease swelling  Verbalized understanding  Tolerated treatment well  Performed all exercises with no increases in pain  Patient would benefit from continued PT to improve R knee ROM, strength, and maximize function for independence at home and community  Plan: Continue per plan of care        Precautions: Standard, R TKA 2021      Manuals       R knee PROM  5'      STM R knee 5' 5'              R knee AAROM 8-100 deg 4-105 deg      TUG 20 4 sec                       Neuro Re-Ed        Glute set x10 x10      Quad set  x10 x10      Ankle pumps Rev               Ther Ex        NuStep warmup        SLR  2x5 2x5      SAQ 2x5 30      LAQ x10 20      Heel slides Peanut x10 Peanut x10      Hip abd slides Rev                       Ther Activity                        Gait Training        Gait training with RW, device fitting 5'               Modalities        Ice post 10' ant, post R knee 10' ant, post R knee

## 2021-01-12 NOTE — TELEPHONE ENCOUNTER
Hopefully he will be able to sort out his insurance issues today  I have gotten good reports from PT  If he continues to have concerns, he could always come in this week for an interval post-operative check

## 2021-01-14 ENCOUNTER — APPOINTMENT (OUTPATIENT)
Dept: PHYSICAL THERAPY | Facility: CLINIC | Age: 61
End: 2021-01-14
Payer: COMMERCIAL

## 2021-01-15 ENCOUNTER — OFFICE VISIT (OUTPATIENT)
Dept: FAMILY MEDICINE CLINIC | Facility: CLINIC | Age: 61
End: 2021-01-15
Payer: COMMERCIAL

## 2021-01-15 ENCOUNTER — TELEPHONE (OUTPATIENT)
Dept: OBGYN CLINIC | Facility: HOSPITAL | Age: 61
End: 2021-01-15

## 2021-01-15 ENCOUNTER — TELEPHONE (OUTPATIENT)
Dept: OBGYN CLINIC | Facility: CLINIC | Age: 61
End: 2021-01-15

## 2021-01-15 VITALS
HEART RATE: 84 BPM | WEIGHT: 273 LBS | RESPIRATION RATE: 16 BRPM | HEIGHT: 72 IN | BODY MASS INDEX: 36.98 KG/M2 | DIASTOLIC BLOOD PRESSURE: 70 MMHG | SYSTOLIC BLOOD PRESSURE: 122 MMHG | TEMPERATURE: 98 F

## 2021-01-15 DIAGNOSIS — Z96.651 S/P TOTAL KNEE REPLACEMENT USING CEMENT, RIGHT: ICD-10-CM

## 2021-01-15 DIAGNOSIS — I10 BENIGN ESSENTIAL HYPERTENSION: Primary | ICD-10-CM

## 2021-01-15 PROCEDURE — 1111F DSCHRG MED/CURRENT MED MERGE: CPT | Performed by: FAMILY MEDICINE

## 2021-01-15 PROCEDURE — 99495 TRANSJ CARE MGMT MOD F2F 14D: CPT | Performed by: FAMILY MEDICINE

## 2021-01-15 RX ORDER — AMLODIPINE BESYLATE 10 MG/1
10 TABLET ORAL DAILY
Qty: 90 TABLET | Refills: 1 | Status: SHIPPED | OUTPATIENT
Start: 2021-01-15 | End: 2021-04-21 | Stop reason: SDUPTHER

## 2021-01-15 NOTE — TELEPHONE ENCOUNTER
Patient See's Dr Montes Chimera: Patient is in need of a letter stating that he will be out of work due to being disabled after surgery  Patient will  note when ready

## 2021-01-15 NOTE — PROGRESS NOTES
Assessment/Plan:    No problem-specific Assessment & Plan notes found for this encounter  tcm  htn stable, should stay on meds and f/u q6m  Any disability per ortho, jhony since he is considering long term disability due to his knees    Wt loss suggested  bmi aware       Diagnoses and all orders for this visit:    Benign essential hypertension  -     amLODIPine (NORVASC) 10 mg tablet; Take 1 tablet (10 mg total) by mouth daily    S/P total knee replacement using cement, right  -     amLODIPine (NORVASC) 10 mg tablet; Take 1 tablet (10 mg total) by mouth daily        On temp disability currently  htn new, f/u q6m  Ortho giving  disab for 3m  Plans left TKR in 3m    Some redness and swelling noted, continue ice, call surgeon if gets worse, no homans    The patient was advised about the office disability policy and that no forms of this nature would be promised or completed  This was explained clearly to the patient's understanding  Return in about 6 months (around 7/15/2021) for Recheck  Subjective:      Patient ID: Caron Carmona is a 61 y o  male      Chief Complaint   Patient presents with    Transition of Care Management     TCM follow up right knee replacement ss,lpn       HPI    Doing HEP  S/p TKR on right  Now on amlodipine 10mg currently, on a different med before, stopped after loss of wife and related stress  No ankle edema with norvasc    TCM Call (since 12/15/2020)     Date and time call was made  1/7/2021  1:07 PM    Hospital care reviewed  Records reviewed    Patient was hospitialized at  [address] [place] [address]        Date of Admission  01/05/21    Date of discharge  01/07/21    Diagnosis  S/P total knee replacement using cement, right    Disposition  Home    Were the patients medications reviewed and updated  Yes    Current Symptoms  -- (Comment)  Right knee pain post op, controlled by pain meds      TCM Call (since 12/15/2020)     Post hospital issues  None    Should patient be enrolled in anticoag monitoring? No    Scheduled for follow up? Yes    Patients specialists  Other (comment)    Other specialists names  Dr Brad Perez    Did you obtain your prescribed medications  Yes    Do you need help managing your prescriptions or medications  No    Is transportation to your appointment needed  No    I have advised the patient to call PCP with any new or worsening symptoms  [place]  Family    The type of support provided  Physical; Emotional    Do you have social support  Yes, as much as I need    Are you recieving any outpatient services  Yes    What type of services  PT    Have you fallen in the last 12 months  No    Interperter language line needed  No    Counseling  Patient    Counseling topics  Activities of daily living; patient and family education; instructions for management; Risk factor reduction; Importance of RX compliance    Comments  Using the walker and a cane without difficulty  He denies fever and states his pain is controlled with his pain med, PRN  He knows to go to the ER if any chest pain or dypsnea and to report any fevers or uncontrolled pain, severe cramping/pain in the calf or foot, etc to his surgeon [facility]          The following portions of the patient's history were reviewed and updated as appropriate: allergies, current medications, past family history, past medical history, past social history, past surgical history and problem list     Review of Systems   Constitutional: Negative for chills and fever           Current Outpatient Medications   Medication Sig Dispense Refill    acetaminophen (TYLENOL) 325 mg tablet Take 3 tablets (975 mg total) by mouth every 8 (eight) hours  0    amLODIPine (NORVASC) 10 mg tablet Take 1 tablet (10 mg total) by mouth daily 90 tablet 1    aspirin 325 mg tablet Take 1 tablet (325 mg total) by mouth 2 (two) times a day 82 tablet 0    celecoxib (CeleBREX) 100 mg capsule Take 1 capsule (100 mg total) by mouth 2 (two) times a day 28 capsule 0    docusate sodium (COLACE) 100 mg capsule Take 1 capsule (100 mg total) by mouth 2 (two) times a day 60 capsule 0    fexofenadine (ALLEGRA) 60 MG tablet TAKE 1 TABLET DAILY 90 tablet 3    fluticasone (FLONASE) 50 mcg/act nasal spray USE 2 SPRAYS IN EACH NOSTRIL DAILY 48 g 3    oxyCODONE (ROXICODONE) 5 mg immediate release tablet Take 1 tablet (5 mg total) by mouth every 4 (four) hours as needed for moderate painMax Daily Amount: 30 mg 30 tablet 0    PEG 3350-KCl-NaCl-NaSulf-Na Asc-C (MOVIPREP) 100 g Take by mouth       No current facility-administered medications for this visit  Objective:    /70   Pulse 84   Temp 98 °F (36 7 °C)   Resp 16   Ht 6' (1 829 m)   Wt 124 kg (273 lb)   BMI 37 03 kg/m²        Physical Exam  Vitals signs and nursing note reviewed  Constitutional:       Appearance: He is well-developed  He is obese  He is not ill-appearing  HENT:      Head: Normocephalic  Eyes:      General: No scleral icterus  Conjunctiva/sclera: Conjunctivae normal    Neck:      Musculoskeletal: Neck supple  Cardiovascular:      Rate and Rhythm: Normal rate and regular rhythm  Heart sounds: No murmur  Pulmonary:      Effort: Pulmonary effort is normal  No respiratory distress  Breath sounds: No wheezing  Abdominal:      Palpations: Abdomen is soft  Tenderness: There is no abdominal tenderness  Musculoskeletal:         General: No deformity  Right lower leg: No edema  Left lower leg: No edema  Skin:     General: Skin is warm and dry  Coloration: Skin is not pale  Findings: Erythema present  Comments: Some redness about right knee   Neurological:      Mental Status: He is alert  Gait: Gait abnormal    Psychiatric:         Mood and Affect: Mood normal          Behavior: Behavior normal          Thought Content:  Thought content normal                 Cody Travis DO

## 2021-01-19 ENCOUNTER — OFFICE VISIT (OUTPATIENT)
Dept: PHYSICAL THERAPY | Facility: CLINIC | Age: 61
End: 2021-01-19
Payer: COMMERCIAL

## 2021-01-19 ENCOUNTER — APPOINTMENT (OUTPATIENT)
Dept: PHYSICAL THERAPY | Facility: CLINIC | Age: 61
End: 2021-01-19
Payer: COMMERCIAL

## 2021-01-19 DIAGNOSIS — M25.561 ACUTE PAIN OF RIGHT KNEE: ICD-10-CM

## 2021-01-19 DIAGNOSIS — Z96.651 STATUS POST TOTAL KNEE REPLACEMENT, RIGHT: Primary | ICD-10-CM

## 2021-01-19 PROCEDURE — 97110 THERAPEUTIC EXERCISES: CPT

## 2021-01-19 PROCEDURE — 97140 MANUAL THERAPY 1/> REGIONS: CPT

## 2021-01-19 NOTE — PROGRESS NOTES
Daily Note     Today's date: 2021  Patient name: Ramírez Laird  : 1960  MRN: [id number]  Referring provider: Zarina Flores DO  Dx:   Encounter Diagnosis     ICD-10-CM    1  Status post total knee replacement, right  Z96 651    2  Acute pain of right knee  M25 561        Start Time: 1230  Stop Time: 1315  Total time in clinic (min): 45 minutes    Subjective: Patient reports 4/10 R knee pain today  He ambulates into clinic with quad cane  Patient complains of difficulties with sleeping through the night  Linette Ahuja is able to ascend/descend stairs reciprocally with no difficulties  He is ambulating non-reciprocally at this time  He is see MD tomorrow for 2 weeks follow up  Objective: See treatment diary below      Assessment: Tolerated treatment well  Fatigue noted post tx  Able to perform all tasks in standing  He is progressing well despite lack of PT  Patient educated on proper cane placement during ambulation, and HEP as noted below  Patient would benefit from continued PT to improve R knee ROM, strength, and maximize function for return to work  Plan: Continue per plan of care        Precautions: Standard, R TKA 2021      Manuals      R knee PROM  5' 5'     STM R knee 5' 5' 5'             R knee AAROM 8-100 deg 4-105 deg 5-101 deg     TUG 20 4 sec                       Neuro Re-Ed        Glute set x10 x10      Quad set  x10 x10 3sx10     Ankle pumps Rev  Prostretch 10sx10             Ther Ex        NuStep warmup   5' L1     SLR  2x5 2x5 2x5     SAQ 2x5 30 20     LAQ x10 20 10     Heel slides Peanut x10 Peanut x10 Peanut x10     Hip abd slides Rev       Mini squats   x10     Step taps   x10      HR   20     Step lunge stretch   10     HS step stretch        Gait Training        Gait training with RW, device fitting 5'  5'             Modalities        Ice post 10' ant, post R knee 10' ant, post R knee 5' post R knee

## 2021-01-20 ENCOUNTER — OFFICE VISIT (OUTPATIENT)
Dept: OBGYN CLINIC | Facility: CLINIC | Age: 61
End: 2021-01-20

## 2021-01-20 ENCOUNTER — APPOINTMENT (OUTPATIENT)
Dept: RADIOLOGY | Facility: CLINIC | Age: 61
End: 2021-01-20
Payer: COMMERCIAL

## 2021-01-20 VITALS
WEIGHT: 274 LBS | SYSTOLIC BLOOD PRESSURE: 140 MMHG | BODY MASS INDEX: 37.11 KG/M2 | HEART RATE: 76 BPM | DIASTOLIC BLOOD PRESSURE: 84 MMHG | HEIGHT: 72 IN

## 2021-01-20 DIAGNOSIS — Z96.651 S/P TOTAL KNEE REPLACEMENT USING CEMENT, RIGHT: ICD-10-CM

## 2021-01-20 DIAGNOSIS — Z96.651 AFTERCARE FOLLOWING RIGHT KNEE JOINT REPLACEMENT SURGERY: ICD-10-CM

## 2021-01-20 DIAGNOSIS — Z47.1 AFTERCARE FOLLOWING RIGHT KNEE JOINT REPLACEMENT SURGERY: ICD-10-CM

## 2021-01-20 DIAGNOSIS — Z96.651 S/P TOTAL KNEE REPLACEMENT USING CEMENT, RIGHT: Primary | ICD-10-CM

## 2021-01-20 PROCEDURE — 73562 X-RAY EXAM OF KNEE 3: CPT

## 2021-01-20 PROCEDURE — 99024 POSTOP FOLLOW-UP VISIT: CPT | Performed by: ORTHOPAEDIC SURGERY

## 2021-01-20 RX ORDER — OXYCODONE HYDROCHLORIDE 5 MG/1
5 TABLET ORAL EVERY 4 HOURS PRN
Qty: 30 TABLET | Refills: 0 | Status: SHIPPED | OUTPATIENT
Start: 2021-01-20 | End: 2021-01-28 | Stop reason: SDUPTHER

## 2021-01-20 NOTE — PROGRESS NOTES
Assessment/Plan:  1  S/P total knee replacement using cement, right  XR knee 3 vw right non injury    oxyCODONE (ROXICODONE) 5 mg immediate release tablet   2  Aftercare following right knee joint replacement surgery       Ivelisse Lin is a pleasant 69-year-old gentleman presenting today for follow-up 2 weeks after undergoing a right total knee arthroplasty  He is doing well at this time  There is no concern for infection or DVT  His incision is healing nicely and may now get it wet in the shower, but should avoid direct scrubbing or saturation  He will continue with aspirin twice a day for the next 4 weeks for DVT prophylaxis  His prosthesis is stable on imaging and exam   He believes that he has now been able to sort out the insurance issues, and plans to continue with physical therapy 3 times a week, as he has been doing well in his recovery so far  We encouraged him to continue with the Lui stocking and elevation to help reduce the swelling  We did refill oxycodone for him today and talked about proper use of Tylenol pain management strategies  We will plan to see him back in 4 weeks for clinical re-evaluation  He will not need x-rays at that time  All of his questions were addressed    Subjective: 2 weeks s/p right TKA    Patient ID: Barbara Mills is a 61 y o  male  Marilou Humphrey is a pleasant 69-year-old gentleman presenting 2 weeks after the aforementioned surgery  He reports that he is doing well at this time  He has transition a walker and is now using a cane to help ambulate  He has been attending physical therapy in a compliant with his home exercise program   He has been taking aspirin for DVT prophylaxis  He finished the Celebrex has been taking oxycodone pain control and Tylenol once or twice a day  He denies any significant calf pain  He denies any paresthesias  Overall, he is pleased with his progress  Review of Systems   Constitutional: Negative  HENT: Negative  Eyes: Negative  Respiratory: Negative  Cardiovascular: Positive for leg swelling  Gastrointestinal: Negative  Endocrine: Negative  Genitourinary: Negative  Musculoskeletal: Positive for arthralgias, gait problem, joint swelling and myalgias  Skin: Negative  Allergic/Immunologic: Negative  Hematological: Negative  Psychiatric/Behavioral: Negative          Past Medical History:   Diagnosis Date    Ankle edema 2015    Arthritis     Benign essential hypertension     COVID-19     2020    Obesity     Organic impotence     Seasonal allergies        Past Surgical History:   Procedure Laterality Date    JOINT REPLACEMENT Right 2021    right knee    ME TOTAL KNEE ARTHROPLASTY Right 2021    Procedure: ARTHROPLASTY KNEE TOTAL;  Surgeon: Lanette Lambert DO;  Location: [facility];  Service: Orthopedics   [facility]K PROCEDURE  2020       Family History   Problem Relation Age of Onset    Alzheimer's disease Mother     Cancer Mother        Social History     Occupational History    Not on file   Tobacco Use    Smoking status: Former Smoker     Quit date: 1970     Years since quittin 4    Smokeless tobacco: Never Used   Substance and Sexual Activity    Alcohol use: Never     Alcohol/week: 0 0 standard drinks     Frequency: Never     Binge frequency: Never    Drug use: Never    Sexual activity: Not on file         Current Outpatient Medications:     amLODIPine (NORVASC) 10 mg tablet, Take 1 tablet (10 mg total) by mouth daily, Disp: 90 tablet, Rfl: 1    aspirin 325 mg tablet, Take 1 tablet (325 mg total) by mouth 2 (two) times a day, Disp: 82 tablet, Rfl: 0    docusate sodium (COLACE) 100 mg capsule, Take 1 capsule (100 mg total) by mouth 2 (two) times a day, Disp: 60 capsule, Rfl: 0    oxyCODONE (ROXICODONE) 5 mg immediate release tablet, Take 1 tablet (5 mg total) by mouth every 4 (four) hours as needed for moderate painMax Daily Amount: 30 mg, Disp: 30 tablet, Rfl: 0    PEG 3350-KCl-NaCl-NaSulf-Na Asc-C (MOVIPREP) 100 g, Take by mouth, Disp: , Rfl:     acetaminophen (TYLENOL) 325 mg tablet, Take 3 tablets (975 mg total) by mouth every 8 (eight) hours (Patient not taking: Reported on 1/20/2021), Disp:  , Rfl: 0    celecoxib (CeleBREX) 100 mg capsule, Take 1 capsule (100 mg total) by mouth 2 (two) times a day (Patient not taking: Reported on 1/20/2021), Disp: 28 capsule, Rfl: 0    fexofenadine (ALLEGRA) 60 MG tablet, TAKE 1 TABLET DAILY (Patient not taking: Reported on 1/20/2021), Disp: 90 tablet, Rfl: 3    fluticasone (FLONASE) 50 mcg/act nasal spray, USE 2 SPRAYS IN EACH NOSTRIL DAILY (Patient not taking: Reported on 1/20/2021), Disp: 48 g, Rfl: 3    No Known Allergies    Objective:  Vitals:    01/20/21 1031   BP: 140/84   Pulse: 76       Body mass index is 37 16 kg/m²  Right Knee Exam     Tenderness   The patient is experiencing tenderness in the lateral joint line, medial joint line, medial retinaculum and lateral retinaculum (IT band)  Range of Motion   Extension: abnormal Right knee extension: 2  Flexion: abnormal Right knee flexion: 95  Tests   Varus: negative Valgus: negative  Drawer:  Anterior - negative      Patellar apprehension: negative    Other   Erythema: absent  Scars: present  Sensation: normal  Pulse: present  Swelling: moderate  Effusion: effusion (trace) present    Comments:  Prosthesis stable at 2, 30, 90°  Soft endpoints the range of motion  Thigh and calf soft and nontender  Moderate nonpitting edema right lower extremity normal for this stage postoperatively  Anterior incision well approximated without signs drainage, dehiscence, or other signs of infection  Right lower extremity slightly more warm and erythematous than left, normal this stage postoperatively  Ambulates slowly with an antalgic gait in the use of single-point cane          Observations     Right Knee   Positive for effusion (trace)  Physical Exam  Vitals signs and nursing note reviewed  Constitutional:       Appearance: He is well-developed  Comments: Body mass index is 37 16 kg/m²  HENT:      Head: Normocephalic and atraumatic  Right Ear: External ear normal       Left Ear: External ear normal    Eyes:      Extraocular Movements: Extraocular movements intact  Neck:      Musculoskeletal: Normal range of motion  Cardiovascular:      Rate and Rhythm: Normal rate  Pulmonary:      Effort: Pulmonary effort is normal    Abdominal:      Palpations: Abdomen is soft  Musculoskeletal:      Right knee: He exhibits effusion (trace)  Comments: See ortho exam   Skin:     General: Skin is warm and dry  Neurological:      Mental Status: He is alert and oriented to person, place, and time  Psychiatric:         Mood and Affect: Mood normal          Behavior: Behavior normal          Thought Content: Thought content normal          Judgment: Judgment normal        I have personally reviewed pertinent films in PACS of the x-rays taken today of his right knee compared them previous postoperative films    There has been no change in alignment of the total knee prosthesis remains well aligned and well cemented without signs of loosening, periprosthetic fracture, or other acute complication

## 2021-01-20 NOTE — LETTER
January 20, 2021     Patient: Bobby Rodriguez   YOB: 1960   Date of Visit: 1/20/2021       To Whom it May Concern:    Angelitoandrew Saida is under my professional care  He was seen in my office on 1/20/2021  He underwent a right total knee arthroplasty on 01/05/2021  He will need to be excused from his IOP group meetings for 3 months postoperatively, as this will be too physically taxing during his recovery  We can provide updates as needed  If you have any questions or concerns, please don't hesitate to call           Sincerely,          Denise Hernandez DO        CC: No Recipients

## 2021-01-21 ENCOUNTER — OFFICE VISIT (OUTPATIENT)
Dept: PHYSICAL THERAPY | Facility: CLINIC | Age: 61
End: 2021-01-21
Payer: COMMERCIAL

## 2021-01-21 DIAGNOSIS — M25.561 ACUTE PAIN OF RIGHT KNEE: ICD-10-CM

## 2021-01-21 DIAGNOSIS — Z96.651 STATUS POST TOTAL KNEE REPLACEMENT, RIGHT: Primary | ICD-10-CM

## 2021-01-21 PROCEDURE — 97140 MANUAL THERAPY 1/> REGIONS: CPT

## 2021-01-21 PROCEDURE — 97110 THERAPEUTIC EXERCISES: CPT

## 2021-01-21 NOTE — PROGRESS NOTES
Daily Note     Today's date: 2021  Patient name: Subha Mendoza  : 1960  MRN: [id number]  Referring provider: Tigist Magallanes DO  Dx:   Encounter Diagnosis     ICD-10-CM    1  Status post total knee replacement, right  Z96 651    2  Acute pain of right knee  M25 561        Start Time: 1430  Stop Time: 1530  Total time in clinic (min): 60 minutes    Subjective: Patient reports 4/10 R knee pain today  He states that his pain is "much better than the other day " He is having an easier time performing stairs at home  Objective: See treatment diary below      Assessment: Tolerated treatment well  Able to navigate steps with no increases in R knee pain  Adequate quad set at this time  Functionally, patient does not have to ambulate with AD, as he does not lose balance  Lack of R TKE during gait noted  Encouraged to perform exercises at home twice a day for improvements in R LE strength  Verbalized understanding  Patient would benefit from continued PT to normalize gait, improve L LE strength and ROM, and maximize function for independence in community  Plan: Continue per plan of care        Precautions: Standard, R TKA 2021      Manuals     R knee PROM  5' 5'     STM R knee 5' 5' 5'             R knee AAROM 8-100 deg 4-105 deg 5-101 deg 5-110 deg    TUG 20 4 sec                       Neuro Re-Ed        Glute set x10 x10      Quad set  x10 x10 3sx10 3sx10    Ankle pumps Rev  Prostretch 10sx10 Prostretch 10sx10            Ther Ex        NuStep warmup   5' L1 10' L2    SLR  2x5 2x5 2x5 2x8    SAQ 2x5 30 20 20    LAQ x10 20 10 10    Heel slides Peanut x10 Peanut x10 Peanut x10 Peanut x10    Hip abd slides Rev       Mini squats   x10 x10    Step taps   x10  x10 4" step up    HR   20 20    Step lunge stretch   10 5 x 10s    HS step stretch    3 x 10s    Knee flex    x10            Gait Training        Gait training with RW, device fitting 5'  5' 5'            Modalities        Ice post 8' ant, post R knee 10' ant, post R knee 5' post R knee 5' post R knee

## 2021-01-26 ENCOUNTER — APPOINTMENT (OUTPATIENT)
Dept: PHYSICAL THERAPY | Facility: CLINIC | Age: 61
End: 2021-01-26
Payer: COMMERCIAL

## 2021-01-27 ENCOUNTER — OFFICE VISIT (OUTPATIENT)
Dept: PHYSICAL THERAPY | Facility: CLINIC | Age: 61
End: 2021-01-27
Payer: COMMERCIAL

## 2021-01-27 DIAGNOSIS — M25.561 ACUTE PAIN OF RIGHT KNEE: ICD-10-CM

## 2021-01-27 DIAGNOSIS — Z96.651 STATUS POST TOTAL KNEE REPLACEMENT, RIGHT: Primary | ICD-10-CM

## 2021-01-27 PROCEDURE — 97140 MANUAL THERAPY 1/> REGIONS: CPT

## 2021-01-27 PROCEDURE — 97110 THERAPEUTIC EXERCISES: CPT

## 2021-01-27 PROCEDURE — 97112 NEUROMUSCULAR REEDUCATION: CPT

## 2021-01-27 NOTE — PROGRESS NOTES
Daily Note     Today's date: 2021  Patient name: Jose Mcdaniel  : 1960  MRN: [id number]  Referring provider: Zach Rangel DO  Dx:   Encounter Diagnosis     ICD-10-CM    1  Status post total knee replacement, right  Z96 651    2  Acute pain of right knee  M25 561        Start Time: 1445  Stop Time: 1530  Total time in clinic (min): 45 minutes    Subjective: Patient reports 4/10 pain in R knee today  Objective: See treatment diary below      Assessment: Tolerated treatment well  Pt demo difficulty with squats, given new exercise of prone ext hang, tolerated well and edu to perform at home  Patient would benefit from continued PT to normalize gait, improve L LE strength and ROM, and maximize function for independence in community  Plan: Continue per plan of care        Precautions: Standard, R TKA 2021      Manuals    R knee PROM  5' 5'  5'   STM R knee 5' 5' 5'  5'           R knee AAROM 8-100 deg 4-105 deg 5-101 deg 5-110 deg 5-110 deg   TUG 20 4 sec                       Neuro Re-Ed        Glute set x10 x10      Quad set  x10 x10 3sx10 3sx10 3s10x   Ankle pumps Rev  Prostretch 10sx10 Prostretch 10sx10 Prostretch 10s10x           Ther Ex        NuStep warmup   5' L1 10' L2 10' L2   SLR  2x5 2x5 2x5 2x8 2x10   SAQ 2x5 30 20 20    LAQ x10 20 10 10 20   Heel slides Peanut x10 Peanut x10 Peanut x10 Peanut x10 Peanut x10   Hip abd slides Rev       Mini squats   x10 x10 x20   Step taps   x10  x10 4" step up x20 6" step tap/step ups   HR   20 20 30   Step lunge stretch   10 5 x 10s 5x10s   HS step stretch    3 x 10s 3x10s   Knee flex    x10 10x   Prone knee ext str     3'   Gait Training        Gait training with RW, device fitting 5'  5' 5'            Modalities        Ice post 10' ant, post R knee 10' ant, post R knee 5' post R knee 5' post R knee deferred

## 2021-01-28 ENCOUNTER — OFFICE VISIT (OUTPATIENT)
Dept: PHYSICAL THERAPY | Facility: CLINIC | Age: 61
End: 2021-01-28
Payer: COMMERCIAL

## 2021-01-28 ENCOUNTER — TELEPHONE (OUTPATIENT)
Dept: OBGYN CLINIC | Facility: CLINIC | Age: 61
End: 2021-01-28

## 2021-01-28 DIAGNOSIS — Z96.651 STATUS POST TOTAL KNEE REPLACEMENT, RIGHT: Primary | ICD-10-CM

## 2021-01-28 DIAGNOSIS — Z96.651 S/P TOTAL KNEE REPLACEMENT USING CEMENT, RIGHT: ICD-10-CM

## 2021-01-28 DIAGNOSIS — M25.561 ACUTE PAIN OF RIGHT KNEE: ICD-10-CM

## 2021-01-28 PROCEDURE — 97140 MANUAL THERAPY 1/> REGIONS: CPT

## 2021-01-28 PROCEDURE — 97110 THERAPEUTIC EXERCISES: CPT

## 2021-01-28 RX ORDER — OXYCODONE HYDROCHLORIDE 5 MG/1
5 TABLET ORAL EVERY 4 HOURS PRN
Qty: 45 TABLET | Refills: 0 | Status: SHIPPED | OUTPATIENT
Start: 2021-01-28 | End: 2021-02-12 | Stop reason: SDUPTHER

## 2021-01-28 RX ORDER — OXYCODONE HYDROCHLORIDE 5 MG/1
5 TABLET ORAL EVERY 4 HOURS PRN
Qty: 30 TABLET | Refills: 0 | Status: CANCELLED | OUTPATIENT
Start: 2021-01-28

## 2021-01-28 NOTE — TELEPHONE ENCOUNTER
Attempted to call and advise    full     ----- Message from Joseph Peralta DO sent at 1/28/2021 11:28 AM EST -----   Please let this patient know I refilled his oxycodone and sent to the [facility] on [facility] thank you    Bella Sanders

## 2021-01-28 NOTE — PROGRESS NOTES
Daily Note     Today's date: 2021  Patient name: Cassia Goel  : 1960  MRN: [id number]  Referring provider: Manuel Ruiz DO  Dx:   Encounter Diagnosis     ICD-10-CM    1  Status post total knee replacement, right  Z96 651    2  Acute pain of right knee  M25 561        Start Time: 1445  Stop Time: 1530  Total time in clinic (min): 45 minutes    Subjective: Patient reports 5/10 R knee pain today  He is ambulating with a SPC with no gait deviations into clinic today  Objective: See treatment diary below      Assessment: Tolerated treatment well  Tolerating closed chain exercises well  Difficulties performing R knee extension ROM during gait and supine exercises  Pain of R knee noted post tx 6/10  Patient would benefit from continued PT to improve R knee ROM, strength, and maximize function for return to work  Plan: Continue per plan of care        Precautions: Standard, R TKA 2021      Manuals    R knee PROM  5' 5'  5' 5'   STM R knee 5' 5' 5'  5' 5'            R knee AAROM 8-100 deg 4-105 deg 5-101 deg 5-110 deg 5-110 deg 5-112 deg   TUG 20 4 sec                          Neuro Re-Ed         Glute set x10 x10       Quad set  x10 x10 3sx10 3sx10 3s10x 2x5   Ankle pumps Rev  Prostretch 10sx10 Prostretch 10sx10 Prostretch 10s10x Prostretch 10s10x            Ther Ex         NuStep warmup   5' L1 10' L2 10' L2 10' L2   SLR  2x5 2x5 2x5 2x8 2x10 2x8   SAQ 2x5 30 20 20  20   LAQ x10 20 10 10 20 20   Heel slides Peanut x10 Peanut x10 Peanut x10 Peanut x10 Peanut x10    Hip abd slides Rev        Mini squats   x10 x10 x20 20   Step taps   x10  x10 4" step up x20 6" step tap/step ups x20 6" step tap/step ups   HR   20 20 30 30   Step lunge stretch   10 5 x 10s 5x10s 5x10s   HS step stretch    3 x 10s 3x10s 3x10s   Knee flex    x10 10x 10   Prone knee ext str     3'    Gait Training         Gait training with RW, device fitting 5'  5' 5'              Modalities Ice post 10' ant, post R knee 10' ant, post R knee 5' post R knee 5' post R knee deferred Def

## 2021-01-28 NOTE — TELEPHONE ENCOUNTER
Patient came into office asking for a refill on his Oxycodone 5 mg  Please advise when sent in  If [facility] is out of Oxycodone for some reason he said you can use [facility] on [facility]

## 2021-02-04 ENCOUNTER — OFFICE VISIT (OUTPATIENT)
Dept: PHYSICAL THERAPY | Facility: CLINIC | Age: 61
End: 2021-02-04
Payer: COMMERCIAL

## 2021-02-04 DIAGNOSIS — Z96.651 STATUS POST TOTAL KNEE REPLACEMENT, RIGHT: Primary | ICD-10-CM

## 2021-02-04 DIAGNOSIS — M25.561 ACUTE PAIN OF RIGHT KNEE: ICD-10-CM

## 2021-02-04 PROCEDURE — 97110 THERAPEUTIC EXERCISES: CPT

## 2021-02-04 PROCEDURE — 97140 MANUAL THERAPY 1/> REGIONS: CPT

## 2021-02-04 NOTE — PROGRESS NOTES
Daily Note     Today's date: 2021  Patient name: George Shetty  : 1960  MRN: [id number]  Referring provider: Yu Jones DO  Dx:   Encounter Diagnosis     ICD-10-CM    1  Status post total knee replacement, right  Z96 651    2  Acute pain of right knee  M25 561        Start Time: 1405          Subjective: Patient reports 7/10 pain in R knee due to "overdueing" it snowblowing this past week  He reports inc swelling in ankle and knee  Objective: See treatment diary below      Assessment: Tolerated treatment well  Not all exercises performed in wt bearing today due to pain and swelling  Dec swelling post RG massage  Patient would benefit from continued PT to improve R knee ROM, strength, and maximize function for return to work  Plan: Continue per plan of care        Precautions: Standard, R TKA 2021      Manuals    R knee PROM 5'  5' 5' hold   STM R knee 5'  5' 5' Retro 8' R knee   HS str     R 3x30"   R knee AAROM 5-101 deg 5-110 deg 5-110 deg 5-112 deg    TUG                        Neuro Re-Ed        [employer] set  3sx10 3sx10 3s10x 2x5    Ankle pumps Prostretch 10sx10 Prostretch 10sx10 Prostretch 10s10x Prostretch 10s10x Table SOS 10s10x           Ther Ex        NuStep warmup 5' L1 10' L2 10' L2 10' L2 15' L1   SLR  2x5 2x8 2x10 2x8 10x   SAQ 20 20  20 30   LAQ 10 10 20 20 30   Heel slides Peanut x10 Peanut x10 Peanut x10     Hip abd slides        Mini squats x10 x10 x20 20 hold   Step taps x10  x10 4" step up x20 6" step tap/step ups x20 6" step tap/step ups hold   HR 20 20 30 30 Ankle pumps 30x elevated   Step lunge stretch 10 5 x 10s 5x10s 5x10s hold   HS step stretch  3 x 10s 3x10s 3x10s Manual    Knee flex  x10 10x 10    Prone knee ext str   3'     Gait Training        Gait training with RW, device fitting 5' 5'              Modalities        Ice post 5' post R knee 5' post R knee deferred Def Deferred home

## 2021-02-09 ENCOUNTER — EVALUATION (OUTPATIENT)
Dept: PHYSICAL THERAPY | Facility: CLINIC | Age: 61
End: 2021-02-09
Payer: COMMERCIAL

## 2021-02-09 DIAGNOSIS — M25.561 ACUTE PAIN OF RIGHT KNEE: ICD-10-CM

## 2021-02-09 DIAGNOSIS — Z96.651 STATUS POST TOTAL KNEE REPLACEMENT, RIGHT: Primary | ICD-10-CM

## 2021-02-09 PROCEDURE — 97140 MANUAL THERAPY 1/> REGIONS: CPT

## 2021-02-09 PROCEDURE — 97110 THERAPEUTIC EXERCISES: CPT

## 2021-02-09 NOTE — PROGRESS NOTES
PT Re-evaluation/Daily Note     Today's date: 2021  Patient name: Caron Carmona  : 1960  MRN: [id number]  Referring provider: Dinora Mackey DO  Dx:   Encounter Diagnosis     ICD-10-CM    1  Status post total knee replacement, right  Z96 651    2  Acute pain of right knee  M25 561        Start Time: 1445  Stop Time: 1530  Total time in clinic (min): 45 minutes    Subjective: Patient reports 6/10 R knee pain today  He complains of increased pain when he is sitting for prolonged periods of time, and continues to ambulate afterwards  He reports increased swelling at times in R knee when he is ambulating for the majority of his day  He has not returned to work due to recent surgical intervention  Since the start of PT, patient reports 75% improvement in function  Objective: See treatment diary below  Goals  STGs to be achieved in 4 weeks  -STG 1: Patient will be independent with HEP  - MET  -STG 2: Patient will have 0/10 R knee pain at rest  - NOT MET  -STG 3: Patient will increase R knee ROM to within normal limits  - MET  -STG 4: Patient will report 40% functional improvement - MET  -STG 5: Patient will demonstrate 1/2 grade MMT improvement in R knee  - MET  -STG 6: Patient will be able to ambulate for greater than 15 minutes with no pain  - MET  -STG 7: Patient will ascend/descend stairs reciprocally with no R knee pain  - NOT MET    LTGs to be achieved in 8 weeks  -LTG 1: Patient will demonstrate independence with maintenance program  - MET  -LTG 2: Patient will perform all functional activities with less than 2/10 R knee pain pain  - NOT MET  -LTG 3: Patient will improve FOTO score by 10 points  - NOT MET  -LTG 4: Patient will report 80% functional improvement  - NOT MET  -LTG 5: Patient will be able to ambulate for greater than 60 minutes with no pain  - NOT MET  -LTG 6: Patient will demonstrate 1 grade MMT improvement in R knee   - NOT MET  -LTG 7: Patient will get into truck to return to work  - NOT MET    Active Range of Motion   Left Knee   Normal active range of motion    Right Knee   Flexion: 100 degrees   Extension: 8 degrees     Passive Range of Motion   Left Knee   Normal passive range of motion    Right Knee   Flexion: 100 degrees   Extension: 8 degrees     Strength/Myotome Testing     Left Knee   Normal strength    Right Knee   Flexion: 3+  Extension: 3+    FOTO goal: 69%   2/9/2021: 51%     Assessment: Patient demonstrates improvements in function reports subjectively, strength, and ROM of R knee  Patient's gait is normalizing despite lack of full R knee ROM at this time  He complains of increased pain in close chain activities  Progressing well despite pain levels  Encouraged to perform stretching and strengthening exercises at home to continue making progress towards goals  Tolerated treatment well  Patient would benefit from continued PT to maximize function for return to work  Plan: Continue per plan of care        Precautions: Standard, R TKA 1/5/2021      Manuals 1/19 1/21 1/27 1/28 2/4 2/9   R knee PROM 5'  5' 5' hold 5'   STM R knee 5'  5' 5' Retro 8' R knee Retro 8' R knee   HS str     R 3x30" R 3x30"   R knee AAROM 5-101 deg 5-110 deg 5-110 deg 5-112 deg  5-114 deg                     Neuro Re-Ed         [employer] set  3sx10 3sx10 3s10x 2x5  3sx10   Ankle pumps Prostretch 10sx10 Prostretch 10sx10 Prostretch 10s10x Prostretch 10s10x Table SOS 10s10x             Ther Ex         NuStep warmup 5' L1 10' L2 10' L2 10' L2 15' L1 10' L1   SLR  2x5 2x8 2x10 2x8 10x    SAQ 20 20  20 30 30   LAQ 10 10 20 20 30 20   Heel slides Peanut x10 Peanut x10 Peanut x10      Mini squats x10 x10 x20 20 hold 20   Step taps x10  x10 4" step up x20 6" step tap/step ups x20 6" step tap/step ups hold x20 6" step tap/step ups   HR 20 20 30 30 Ankle pumps 30x elevated 20   Step lunge stretch 10 5 x 10s 5x10s 5x10s hold 5x10s   HS step stretch  3 x 10s 3x10s 3x10s Manual  3x10s   Knee flex x10 10x 10     Prone knee ext str   3'      TKE      3sx10 CC 4 5#   Gait Training         Gait training with RW, device fitting 5' 5'                Modalities         Ice post 5' post R knee 5' post R knee deferred Def Deferred home Def home

## 2021-02-11 ENCOUNTER — OFFICE VISIT (OUTPATIENT)
Dept: PHYSICAL THERAPY | Facility: CLINIC | Age: 61
End: 2021-02-11
Payer: COMMERCIAL

## 2021-02-11 DIAGNOSIS — Z96.651 STATUS POST TOTAL KNEE REPLACEMENT, RIGHT: Primary | ICD-10-CM

## 2021-02-11 DIAGNOSIS — M25.561 ACUTE PAIN OF RIGHT KNEE: ICD-10-CM

## 2021-02-11 PROCEDURE — 97110 THERAPEUTIC EXERCISES: CPT

## 2021-02-11 PROCEDURE — 97140 MANUAL THERAPY 1/> REGIONS: CPT

## 2021-02-11 NOTE — PROGRESS NOTES
Daily Note     Today's date: 2021  Patient name: Richard Ordonez  : 1960  MRN: [id number]  Referring provider: Evonne Harmon DO  Dx:   Encounter Diagnosis     ICD-10-CM    1  Status post total knee replacement, right  Z96 651    2  Acute pain of right knee  M25 561        Start Time: 1400  Stop Time: 1445  Total time in clinic (min): 45 minutes    Subjective: Patient reports 4/10 R knee pain today  He states that his swelling is much improved since last visit  He complains of increased soreness after PT sessions that is dissipated with ice and rest        Objective: See treatment diary below      Assessment: Tolerated treatment well  Pain persists with closed chain activities  Complains of increased pain with R knee extension  Encouraged to avoid placement of pillow under R knee to improve R knee extension ROM  Patient would benefit from continued PT to improve R knee strength, ROM, and maximize function for return to work  Plan: Continue per plan of care        Precautions: Standard, R TKA 2021      Manuals    R knee PROM 5' 5' hold 5' 5'   STM R knee 5' 5' Retro 8' R knee Retro 8' R knee Retro 8' R knee   HS str   R 3x30" R 3x30"    R knee AAROM 5-110 deg 5-112 deg  5-114 deg 5-114 deg                   Neuro Re-Ed        [employer] set  3s10x 2x5  3sx10 3sx10   Ankle pumps Prostretch 10s10x Prostretch 10s10x Table SOS 10s10x             Ther Ex        NuStep warmup 10' L2 10' L2 15' L1 10' L1 10' L2   SLR  2x10 2x8 10x  2x5   SAQ  20 30 30 30   LAQ 20 20 30 20 20   Heel slides Peanut x10       Mini squats x20 20 hold 20 15   Step taps x20 6" step tap/step ups x20 6" step tap/step ups hold x20 6" step tap/step ups x20 6" step ups   HR 30 30 Ankle pumps 30x elevated 20 20   Step lunge stretch 5x10s 5x10s hold 5x10s 5x10s   HS step stretch 3x10s 3x10s Manual  3x10s 3x10s   Knee flex 10x 10      Prone knee ext str 3'       TKE    3sx10 CC 4 5# 3sx10 CC 4 5# Pro stretch     5x10s           Gait Training        Gait training with RW, device fitting                Modalities        Ice post deferred Def Deferred home Def home Def home

## 2021-02-12 ENCOUNTER — TELEPHONE (OUTPATIENT)
Dept: OBGYN CLINIC | Facility: CLINIC | Age: 61
End: 2021-02-12

## 2021-02-12 DIAGNOSIS — Z96.651 S/P TOTAL KNEE REPLACEMENT USING CEMENT, RIGHT: ICD-10-CM

## 2021-02-12 RX ORDER — OXYCODONE HYDROCHLORIDE 5 MG/1
5 TABLET ORAL EVERY 4 HOURS PRN
Qty: 30 TABLET | Refills: 0 | Status: SHIPPED | OUTPATIENT
Start: 2021-02-12 | End: 2021-02-17 | Stop reason: SDUPTHER

## 2021-02-16 ENCOUNTER — OFFICE VISIT (OUTPATIENT)
Dept: PHYSICAL THERAPY | Facility: CLINIC | Age: 61
End: 2021-02-16
Payer: COMMERCIAL

## 2021-02-16 DIAGNOSIS — Z96.651 STATUS POST TOTAL KNEE REPLACEMENT, RIGHT: Primary | ICD-10-CM

## 2021-02-16 DIAGNOSIS — M25.561 ACUTE PAIN OF RIGHT KNEE: ICD-10-CM

## 2021-02-16 PROCEDURE — 97110 THERAPEUTIC EXERCISES: CPT

## 2021-02-16 NOTE — PROGRESS NOTES
Daily Note     Today's date: 2021  Patient name: Davon Buck  : 1960  MRN: [id number]  Referring provider: Victorina Burks DO  Dx:   Encounter Diagnosis     ICD-10-CM    1  Status post total knee replacement, right  Z96 651    2  Acute pain of right knee  M25 561        Start Time: 1400  Stop Time: 1445  Total time in clinic (min): 45 minutes    Subjective: Patient reports 4/10 R knee pain today  He reports performing stairs reciprocally while holding on to B handrail  His swelling is much improved since last visit  Objective: See treatment diary below      Assessment: Gait is normalizing  Lack of R heel strike noted due to lack of TKE  Swelling persists despite elevation, ankle pumps, and application of ice  Educated on nature of swelling associated to performing activities around his home  Verbalized understanding  Tolerated treatment well  Patient would benefit from continued PT to normalize gait, and maximize function to return to work  Plan: Continue per plan of care        Precautions: Standard, R TKA 2021      Manuals    R knee PROM 5' 5' hold 5' 5'    STM R knee 5' 5' Retro 8' R knee Retro 8' R knee Retro 8' R knee    HS str   R 3x30" R 3x30"     R knee AAROM 5-110 deg 5-112 deg  5-114 deg 5-114 deg 4-115 deg                     Neuro Re-Ed         [employer] set  3s10x 2x5  3sx10 3sx10 3sx10   Ankle pumps Prostretch 10s10x Prostretch 10s10x Table SOS 10s10x               Ther Ex         NuStep warmup 10' L2 10' L2 15' L1 10' L1 10' L2 10' L2   SLR  2x10 2x8 10x  2x5    SAQ  20 30 30 30 20   LAQ 20 20 30 20 20 20   Heel slides Peanut x10     Peanut 10   Mini squats x20 20 hold 20 15 10   Step taps x20 6" step tap/step ups x20 6" step tap/step ups hold x20 6" step tap/step ups x20 6" step ups x20 6" step ups   HR 30 30 Ankle pumps 30x elevated 20 20 20   Step lunge stretch 5x10s 5x10s hold 5x10s 5x10s 5x10s   HS step stretch 3x10s 3x10s Manual  3x10s 3x10s 3x10s   Knee flex 10x 10       Prone knee ext str 3'        TKE    3sx10 CC 4 5# 3sx10 CC 4 5# 3sx10 CC 6#   Pro stretch     5x10s 5x10s            Gait Training         Gait training with RW, device fitting                  Modalities         Ice post deferred Def Deferred home Def home Def home

## 2021-02-17 ENCOUNTER — OFFICE VISIT (OUTPATIENT)
Dept: OBGYN CLINIC | Facility: CLINIC | Age: 61
End: 2021-02-17

## 2021-02-17 VITALS — BODY MASS INDEX: 37.41 KG/M2 | HEIGHT: 72 IN | WEIGHT: 276.2 LBS

## 2021-02-17 DIAGNOSIS — Z96.651 AFTERCARE FOLLOWING RIGHT KNEE JOINT REPLACEMENT SURGERY: ICD-10-CM

## 2021-02-17 DIAGNOSIS — Z96.651 S/P TOTAL KNEE REPLACEMENT USING CEMENT, RIGHT: Primary | ICD-10-CM

## 2021-02-17 DIAGNOSIS — Z47.1 AFTERCARE FOLLOWING RIGHT KNEE JOINT REPLACEMENT SURGERY: ICD-10-CM

## 2021-02-17 PROCEDURE — 99024 POSTOP FOLLOW-UP VISIT: CPT | Performed by: ORTHOPAEDIC SURGERY

## 2021-02-17 RX ORDER — OXYCODONE HYDROCHLORIDE 5 MG/1
5 TABLET ORAL EVERY 4 HOURS PRN
Qty: 30 TABLET | Refills: 0 | Status: SHIPPED | OUTPATIENT
Start: 2021-02-17 | End: 2021-03-31 | Stop reason: ALTCHOICE

## 2021-02-17 NOTE — PROGRESS NOTES
Assessment/Plan:  1  S/P total knee replacement using cement, right  oxyCODONE (ROXICODONE) 5 mg immediate release tablet   2  Aftercare following right knee joint replacement surgery       Scribe Attestation    I,:  Pernell Mckoy am acting as a scribe while in the presence of the attending physician :       I,:  Giovanna Orta DO personally performed the services described in this documentation    as scribed in my presence :         Thi Phillips is a pleasant 55-year-old male who returns today for follow-up evaluation six weeks status post right total knee arthroplasty  I am very pleased with his clinical presentation today in the office  He no longer needs DVT prophylaxis at this time  He should continue with his efforts at formal therapy  He may continue with activity to his tolerance  He may return to driving once he discontinues his use of narcotics for pain control  A new prescription was recently provided to him  We will see him back in six weeks for repeat clinical evaluation with right knee x-rays on arrival   We will evaluate his candidacy for potential left total knee arthroplasty at that visit  Subjective: Follow-up evaluation six weeks status post right total knee arthroplasty    Patient ID: [id number]  is a 61 y o  male who returns today for follow-up evaluation six weeks status post right total knee arthroplasty  He has been participating in formal therapy  He states that he has been doing well and feels great  He has been able to participate in all desired activities of daily living and enjoyment without issue  He states that he forgets he has a knee replacement some days  He states that he does feel ready to wean from an assistive device  He denies any new injury or trauma  Review of Systems   Constitutional: Positive for activity change  Negative for chills, fever and unexpected weight change  HENT: Negative for hearing loss, nosebleeds and sore throat      Eyes: Negative for pain, redness and visual disturbance  Respiratory: Negative for cough, shortness of breath and wheezing  Cardiovascular: Negative for chest pain, palpitations and leg swelling  Gastrointestinal: Negative for abdominal pain, nausea and vomiting  Endocrine: Negative for polyphagia and polyuria  Genitourinary: Negative for dysuria and hematuria  Musculoskeletal: Positive for myalgias  Negative for arthralgias and joint swelling  See HPI   Skin: Negative for rash and wound  Neurological: Negative for dizziness, numbness and headaches  Psychiatric/Behavioral: Negative for decreased concentration and suicidal ideas  The patient is not nervous/anxious            Past Medical History:   Diagnosis Date    Ankle edema 2015    Arthritis     Benign essential hypertension     COVID-19     2020    Obesity     Organic impotence     Seasonal allergies        Past Surgical History:   Procedure Laterality Date    JOINT REPLACEMENT Right 2021    right knee    MI TOTAL KNEE ARTHROPLASTY Right 2021    Procedure: ARTHROPLASTY KNEE TOTAL;  Surgeon: Yung Mejia DO;  Location: [facility];  Service: Orthopedics   [address] [place] [address] MSK PROCEDURE  2020       Family History   Problem Relation Age of Onset    Alzheimer's disease Mother     Cancer Mother        Social History     Occupational History    Not on file   Tobacco Use    Smoking status: Former Smoker     Quit date: 1970     Years since quittin 5    Smokeless tobacco: Never Used   Substance and Sexual Activity    Alcohol use: Never     Alcohol/week: 0 0 standard drinks     Frequency: Never     Binge frequency: Never    Drug use: Never    Sexual activity: Not on file         Current Outpatient Medications:     acetaminophen (TYLENOL) 325 mg tablet, Take 3 tablets (975 mg total) by mouth every 8 (eight) hours, Disp:  , Rfl: 0    amLODIPine (NORVASC) 10 mg tablet, Take 1 tablet (10 mg total) by mouth daily, Disp: 90 tablet, Rfl: 1    oxyCODONE (ROXICODONE) 5 mg immediate release tablet, Take 1 tablet (5 mg total) by mouth every 4 (four) hours as needed for moderate painMax Daily Amount: 30 mg, Disp: 30 tablet, Rfl: 0    aspirin 325 mg tablet, Take 1 tablet (325 mg total) by mouth 2 (two) times a day (Patient not taking: Reported on 2/17/2021), Disp: 82 tablet, Rfl: 0    celecoxib (CeleBREX) 100 mg capsule, Take 1 capsule (100 mg total) by mouth 2 (two) times a day (Patient not taking: Reported on 1/20/2021), Disp: 28 capsule, Rfl: 0    docusate sodium (COLACE) 100 mg capsule, Take 1 capsule (100 mg total) by mouth 2 (two) times a day (Patient not taking: Reported on 2/17/2021), Disp: 60 capsule, Rfl: 0    fexofenadine (ALLEGRA) 60 MG tablet, TAKE 1 TABLET DAILY (Patient not taking: Reported on 1/20/2021), Disp: 90 tablet, Rfl: 3    fluticasone (FLONASE) 50 mcg/act nasal spray, USE 2 SPRAYS IN EACH NOSTRIL DAILY (Patient not taking: Reported on 1/20/2021), Disp: 48 g, Rfl: 3    PEG 3350-KCl-NaCl-NaSulf-Na Asc-C (MOVIPREP) 100 g, Take by mouth, Disp: , Rfl:     No Known Allergies    Objective: There were no vitals filed for this visit  Body mass index is 37 46 kg/m²  Right Knee Exam     Tenderness   The patient is experiencing no tenderness  Range of Motion   Right knee extension: 5  Right knee flexion: 120  Tests   Varus: negative Valgus: negative  Drawer:  Anterior - negative    Posterior - negative    Other   Erythema: absent  Scars: present (well healed anterior operative scar)  Sensation: normal  Pulse: present  Swelling: none  Effusion: no effusion present    Comments:  Stable at five, 30 90°  Neurovascularly intact distally  No warmth, erythema or signs of infection  Patella tracks midline and flat  Resolving hematoma            Observations     Right Knee   Negative for effusion  Physical Exam  Vitals signs and nursing note reviewed     Constitutional: Appearance: He is well-developed  HENT:      Head: Normocephalic and atraumatic  Eyes:      General: No scleral icterus  Conjunctiva/sclera: Conjunctivae normal    Neck:      Musculoskeletal: Normal range of motion and neck supple  Cardiovascular:      Rate and Rhythm: Normal rate  Pulmonary:      Effort: Pulmonary effort is normal  No respiratory distress  Musculoskeletal:      Right knee: He exhibits no effusion  Comments: As noted in HPI   Skin:     General: Skin is warm and dry  Neurological:      Mental Status: He is alert and oriented to person, place, and time     Psychiatric:         Behavior: Behavior normal

## 2021-02-18 ENCOUNTER — APPOINTMENT (OUTPATIENT)
Dept: PHYSICAL THERAPY | Facility: CLINIC | Age: 61
End: 2021-02-18
Payer: COMMERCIAL

## 2021-02-19 ENCOUNTER — TELEPHONE (OUTPATIENT)
Dept: PHYSICAL THERAPY | Facility: CLINIC | Age: 61
End: 2021-02-19

## 2021-02-19 ENCOUNTER — APPOINTMENT (OUTPATIENT)
Dept: PHYSICAL THERAPY | Facility: CLINIC | Age: 61
End: 2021-02-19
Payer: COMMERCIAL

## 2021-02-24 ENCOUNTER — APPOINTMENT (OUTPATIENT)
Dept: PHYSICAL THERAPY | Facility: CLINIC | Age: 61
End: 2021-02-24
Payer: COMMERCIAL

## 2021-02-25 ENCOUNTER — TELEPHONE (OUTPATIENT)
Dept: OBGYN CLINIC | Facility: HOSPITAL | Age: 61
End: 2021-02-25

## 2021-02-25 ENCOUNTER — TELEPHONE (OUTPATIENT)
Dept: PHYSICAL THERAPY | Facility: CLINIC | Age: 61
End: 2021-02-25

## 2021-02-25 NOTE — TELEPHONE ENCOUNTER
Pt contacted Call Center requested refill of their medication  Medication Name:oxyCODONE      Dosage of Med: 5 mg immediate release      Frequency of Med: Take 1 tablet (5 mg total) by mouth every 4 (four) hours as needed for moderate painMax Daily Amount: 30 mg    Remaining Medication: 1 tablet       Pharmacy and Location: [address]      Pt   Preferred Callback Phone Number:  [phone]

## 2021-02-25 NOTE — TELEPHONE ENCOUNTER
He is outside of the 6 week post-operative window that we can safely prescribe narcotics  He will need to rely on tylenol and NSAIDs at this time   Thanks

## 2021-02-25 NOTE — TELEPHONE ENCOUNTER
Lucía Jaimes I clarified with Dr Fantasma Blair he cannot prescribe narcotics  Tylenol and Nsaids only

## 2021-03-02 ENCOUNTER — TELEPHONE (OUTPATIENT)
Dept: PHYSICAL THERAPY | Facility: CLINIC | Age: 61
End: 2021-03-02

## 2021-03-02 ENCOUNTER — TELEMEDICINE (OUTPATIENT)
Dept: FAMILY MEDICINE CLINIC | Facility: CLINIC | Age: 61
End: 2021-03-02
Payer: COMMERCIAL

## 2021-03-02 DIAGNOSIS — J02.9 ACUTE PHARYNGITIS, UNSPECIFIED ETIOLOGY: Primary | ICD-10-CM

## 2021-03-02 PROCEDURE — 99213 OFFICE O/P EST LOW 20 MIN: CPT | Performed by: NURSE PRACTITIONER

## 2021-03-02 RX ORDER — AMOXICILLIN 875 MG/1
875 TABLET, COATED ORAL 2 TIMES DAILY
Qty: 20 TABLET | Refills: 0 | Status: SHIPPED | OUTPATIENT
Start: 2021-03-02 | End: 2021-03-12

## 2021-03-02 NOTE — TELEPHONE ENCOUNTER
Patient called regarding continued fever, sore thorat, and now face swelling  He is concerned about possibly having a positive COVID test  He is to call his PCP for testing  Today and Thursday's appointments will both be cancelled to await results of COVID screening

## 2021-03-02 NOTE — PROGRESS NOTES
Virtual Regular Visit      Assessment/Plan:    Problem List Items Addressed This Visit     None      Visit Diagnoses     Acute pharyngitis, unspecified etiology    -  Primary    Relevant Medications    amoxicillin (AMOXIL) 875 mg tablet        Take medication with food  It is important that you take the entire course of antibiotics prescribed  May also take a probiotic of your choice to maintain healthy GI alin  Can take some probiotic and yogurt with the medication  Gargle with warm salt water for 5 minutes every 4 hours  Drink plenty of fluids at least 6 glasses of water a day  Can use some honey lemon tea  Call or follow up if symptoms are not better in 7 days  Supportive care discussed and advised  Advised to RTO for any worsening and no improvement  Follow up for no improvement and worsening of conditions  Patient advised and educated when to see immediate medical care  Reason for visit is   Chief Complaint   Patient presents with    Virtual Regular Visit        Encounter provider BRIJESH Rivas    Provider located at  O  [address] [id number]      Recent Visits  No visits were found meeting these conditions  Showing recent visits within past 7 days and meeting all other requirements     Today's Visits  Date Type Provider Dept   03/02/21 Telemedicine Silas Rivas today's visits and meeting all other requirements     Future Appointments  No visits were found meeting these conditions  Showing future appointments within next 150 days and meeting all other requirements        The patient was identified by name and date of birth  Quinton Sosa was informed that this is a telemedicine visit and that the visit is being conducted through [facility] and patient was informed that this is a secure, HIPAA-compliant platform  He agrees to proceed     My office door was closed   No one else was in the room  He acknowledged consent and understanding of privacy and security of the video platform  The patient has agreed to participate and understands they can discontinue the visit at any time  Patient is aware this is a billable service  Sonia Childress is a 61 y o  male    HPI   Patient woke with sore throat today and left side of his neck glands are swollen  Stated that currently feeling little better  Denies any known exposure to COVID-19 positive or presumed patient  Denies any fever, chills, sob, cough, running nose, fatigue, headache, new loss of sense of smell and taste,  congestion,  nausea, vomiting and diarrhea      Past Medical History:   Diagnosis Date    Ankle edema 07/13/2015    Arthritis     Benign essential hypertension     COVID-19     november 2020    Obesity     Organic impotence     Seasonal allergies        Past Surgical History:   Procedure Laterality Date    JOINT REPLACEMENT Right 01/05/2021    right knee    MA TOTAL KNEE ARTHROPLASTY Right 1/5/2021    Procedure: ARTHROPLASTY KNEE TOTAL;  Surgeon: Julia Meadows DO;  Location: [address] [place] [address];  Service: Orthopedics    TONSILLECTOMY      US GUIDED MSK PROCEDURE  6/11/2020       Current Outpatient Medications   Medication Sig Dispense Refill    acetaminophen (TYLENOL) 325 mg tablet Take 3 tablets (975 mg total) by mouth every 8 (eight) hours  0    amLODIPine (NORVASC) 10 mg tablet Take 1 tablet (10 mg total) by mouth daily 90 tablet 1    amoxicillin (AMOXIL) 875 mg tablet Take 1 tablet (875 mg total) by mouth 2 (two) times a day for 10 days 20 tablet 0    aspirin 325 mg tablet Take 1 tablet (325 mg total) by mouth 2 (two) times a day (Patient not taking: Reported on 2/17/2021) 82 tablet 0    celecoxib (CeleBREX) 100 mg capsule Take 1 capsule (100 mg total) by mouth 2 (two) times a day (Patient not taking: Reported on 1/20/2021) 28 capsule 0    docusate sodium (COLACE) 100 mg capsule Take 1 capsule (100 mg total) by mouth 2 (two) times a day (Patient not taking: Reported on 2/17/2021) 60 capsule 0    fexofenadine (ALLEGRA) 60 MG tablet TAKE 1 TABLET DAILY (Patient not taking: Reported on 1/20/2021) 90 tablet 3    fluticasone (FLONASE) 50 mcg/act nasal spray USE 2 SPRAYS IN EACH NOSTRIL DAILY (Patient not taking: Reported on 1/20/2021) 48 g 3    oxyCODONE (ROXICODONE) 5 mg immediate release tablet Take 1 tablet (5 mg total) by mouth every 4 (four) hours as needed for moderate painMax Daily Amount: 30 mg 30 tablet 0    PEG 3350-KCl-NaCl-NaSulf-Na Asc-C (MOVIPREP) 100 g Take by mouth       No current facility-administered medications for this visit  No Known Allergies    Review of Systems   Constitutional: Negative for chills, diaphoresis, fatigue, fever and unexpected weight change  HENT: Positive for sore throat  Negative for congestion, dental problem, drooling, ear discharge, ear pain, facial swelling, hearing loss, mouth sores, nosebleeds, postnasal drip, rhinorrhea, sinus pressure, sinus pain, sneezing, tinnitus, trouble swallowing and voice change  As noted in HPI     Respiratory: Negative for cough, chest tightness, shortness of breath and wheezing  Cardiovascular: Negative  Gastrointestinal: Negative for abdominal pain, constipation, diarrhea, nausea and vomiting  Musculoskeletal: Negative  Skin: Negative  Neurological: Negative for dizziness, light-headedness and headaches  Hematological: Positive for adenopathy  As noted in HPI         Video Exam    There were no vitals filed for this visit  Physical Exam  Constitutional:       Appearance: He is well-developed  HENT:      Nose: Nose normal    Eyes:      Conjunctiva/sclera: Conjunctivae normal    Neck:      Musculoskeletal: Normal range of motion     Pulmonary:      Effort: Pulmonary effort is normal       Comments: No cough and distress noted on video call  Skin:     Comments: No diaphoresis noted on video call   Neurological:      Mental Status: He is alert and oriented to person, place, and time  Psychiatric:         Mood and Affect: Mood normal          Behavior: Behavior normal          Thought Content: Thought content normal          Judgment: Judgment normal           I spent 8 minutes directly with the patient during this visit      VIRTUAL VISIT 308 Jacklyn Gutierrez acknowledges that he has consented to an online visit or consultation  He understands that the online visit is based solely on information provided by him, and that, in the absence of a face-to-face physical evaluation by the physician, the diagnosis he receives is both limited and provisional in terms of accuracy and completeness  This is not intended to replace a full medical face-to-face evaluation by the physician  Bryant Bridges understands and accepts these terms

## 2021-03-08 ENCOUNTER — TELEPHONE (OUTPATIENT)
Dept: PHYSICAL THERAPY | Facility: CLINIC | Age: 61
End: 2021-03-08

## 2021-03-08 NOTE — TELEPHONE ENCOUNTER
Unable to leave message due to mailbox being full  MD made aware of poor attendance s/p R TKA  Patient to schedule re-evaluation with primary therapist next visit

## 2021-03-09 ENCOUNTER — TELEPHONE (OUTPATIENT)
Dept: PHYSICAL THERAPY | Facility: CLINIC | Age: 61
End: 2021-03-09

## 2021-03-10 ENCOUNTER — TELEPHONE (OUTPATIENT)
Dept: PHYSICAL THERAPY | Facility: CLINIC | Age: 61
End: 2021-03-10

## 2021-03-15 ENCOUNTER — TELEPHONE (OUTPATIENT)
Dept: PHYSICAL THERAPY | Facility: CLINIC | Age: 61
End: 2021-03-15

## 2021-03-15 NOTE — TELEPHONE ENCOUNTER
Unable to leave message regarding rescheduling appointments due to mailbox full  Referring surgeon made aware  Patient has not attended PT since 2/16/2021  Reschedule re-evaluation with primary PT if patient calls back

## 2021-03-22 ENCOUNTER — TELEPHONE (OUTPATIENT)
Dept: OBGYN CLINIC | Facility: HOSPITAL | Age: 61
End: 2021-03-22

## 2021-03-22 NOTE — TELEPHONE ENCOUNTER
DR Susan Wilkerson  RE: Records    Patient  States he filled out a medical records request in February at the [facility] office    Patient calling to check status of request

## 2021-03-31 ENCOUNTER — OFFICE VISIT (OUTPATIENT)
Dept: OBGYN CLINIC | Facility: CLINIC | Age: 61
End: 2021-03-31
Payer: COMMERCIAL

## 2021-03-31 ENCOUNTER — APPOINTMENT (OUTPATIENT)
Dept: RADIOLOGY | Facility: CLINIC | Age: 61
End: 2021-03-31
Payer: COMMERCIAL

## 2021-03-31 ENCOUNTER — HOSPITAL ENCOUNTER (OUTPATIENT)
Facility: HOSPITAL | Age: 61
Setting detail: OUTPATIENT SURGERY
End: 2021-03-31
Attending: ORTHOPAEDIC SURGERY | Admitting: ORTHOPAEDIC SURGERY
Payer: COMMERCIAL

## 2021-03-31 VITALS — BODY MASS INDEX: 36.6 KG/M2 | WEIGHT: 270.2 LBS | HEIGHT: 72 IN

## 2021-03-31 DIAGNOSIS — Z96.651 S/P TOTAL KNEE REPLACEMENT USING CEMENT, RIGHT: ICD-10-CM

## 2021-03-31 DIAGNOSIS — M17.12 PRIMARY LOCALIZED OSTEOARTHRITIS OF LEFT KNEE: ICD-10-CM

## 2021-03-31 DIAGNOSIS — Z11.59 SCREENING FOR VIRAL DISEASE: ICD-10-CM

## 2021-03-31 DIAGNOSIS — Z96.651 AFTERCARE FOLLOWING RIGHT KNEE JOINT REPLACEMENT SURGERY: ICD-10-CM

## 2021-03-31 DIAGNOSIS — M25.562 CHRONIC PAIN OF LEFT KNEE: ICD-10-CM

## 2021-03-31 DIAGNOSIS — E66.9 OBESITY (BMI 30-39.9): ICD-10-CM

## 2021-03-31 DIAGNOSIS — M17.12 PRIMARY LOCALIZED OSTEOARTHRITIS OF LEFT KNEE: Primary | ICD-10-CM

## 2021-03-31 DIAGNOSIS — G47.26 CIRCADIAN RHYTHM SLEEP DISORDER, SHIFT WORK TYPE: ICD-10-CM

## 2021-03-31 DIAGNOSIS — I10 BENIGN ESSENTIAL HYPERTENSION: ICD-10-CM

## 2021-03-31 DIAGNOSIS — Z01.812 PRE-OPERATIVE LABORATORY EXAMINATION: ICD-10-CM

## 2021-03-31 DIAGNOSIS — G89.29 CHRONIC PAIN OF LEFT KNEE: ICD-10-CM

## 2021-03-31 DIAGNOSIS — Z47.1 AFTERCARE FOLLOWING RIGHT KNEE JOINT REPLACEMENT SURGERY: ICD-10-CM

## 2021-03-31 PROBLEM — M71.21 BAKER'S CYST OF KNEE, RIGHT: Status: RESOLVED | Noted: 2020-03-30 | Resolved: 2021-03-31

## 2021-03-31 PROCEDURE — 73562 X-RAY EXAM OF KNEE 3: CPT

## 2021-03-31 PROCEDURE — 99214 OFFICE O/P EST MOD 30 MIN: CPT | Performed by: ORTHOPAEDIC SURGERY

## 2021-03-31 PROCEDURE — 1036F TOBACCO NON-USER: CPT | Performed by: ORTHOPAEDIC SURGERY

## 2021-03-31 PROCEDURE — 3008F BODY MASS INDEX DOCD: CPT | Performed by: ORTHOPAEDIC SURGERY

## 2021-03-31 RX ORDER — CHLORHEXIDINE GLUCONATE 0.12 MG/ML
15 RINSE ORAL ONCE
Status: CANCELLED | OUTPATIENT
Start: 2021-04-27 | End: 2021-03-31

## 2021-03-31 RX ORDER — FERROUS SULFATE TAB EC 324 MG (65 MG FE EQUIVALENT) 324 (65 FE) MG
324 TABLET DELAYED RESPONSE ORAL
Qty: 30 TABLET | Refills: 0 | Status: SHIPPED | OUTPATIENT
Start: 2021-03-31 | End: 2021-04-25

## 2021-03-31 RX ORDER — FOLIC ACID 1 MG/1
1 TABLET ORAL DAILY
Qty: 30 TABLET | Refills: 0 | Status: SHIPPED | OUTPATIENT
Start: 2021-03-31 | End: 2021-04-25

## 2021-03-31 RX ORDER — ZINC SULFATE 50(220)MG
220 CAPSULE ORAL DAILY
Qty: 30 CAPSULE | Refills: 0 | Status: SHIPPED | OUTPATIENT
Start: 2021-03-31 | End: 2022-05-19

## 2021-03-31 RX ORDER — MELATONIN
1000 DAILY
Qty: 30 TABLET | Refills: 0 | Status: SHIPPED | OUTPATIENT
Start: 2021-03-31 | End: 2021-04-25

## 2021-03-31 NOTE — PROGRESS NOTES
Assessment/Plan:  1  Primary localized osteoarthritis of left knee  Case request operating room: ARTHROPLASTY KNEE TOTAL    Comprehensive metabolic panel    CBC and differential    PAT Covid Screening    Protime-INR    APTT    MRSA culture    Ambulatory referral to Family Practice    Ambulatory referral to Physical Therapy    EKG 12 lead    XR chest pa & lateral    Case request operating room: ARTHROPLASTY KNEE TOTAL    cholecalciferol (VITAMIN D3) 1,000 units tablet    ascorbic Acid (VITAMIN C) 500 MG CPCR    zinc sulfate (ZINCATE) 220 mg capsule    ferrous sulfate 324 (65 Fe) mg    folic acid (FOLVITE) 1 mg tablet    XR knee 3 vw left non injury   2  Chronic pain of left knee  Case request operating room: ARTHROPLASTY KNEE TOTAL    Comprehensive metabolic panel    CBC and differential    PAT Covid Screening    Protime-INR    APTT    MRSA culture    Ambulatory referral to [facility]    Ambulatory referral to Physical Therapy    EKG 12 lead    XR chest pa & lateral    Case request operating room: ARTHROPLASTY KNEE TOTAL    cholecalciferol (VITAMIN D3) 1,000 units tablet    ascorbic Acid (VITAMIN C) 500 MG CPCR    zinc sulfate (ZINCATE) 220 mg capsule    ferrous sulfate 324 (65 Fe) mg    folic acid (FOLVITE) 1 mg tablet   3  S/P total knee replacement using cement, right  XR knee 3 vw right non injury    Ambulatory referral to [facility]   4  Aftercare following right knee joint replacement surgery  Ambulatory referral to [facility]   5  Benign essential hypertension  Ambulatory referral to [facility]   6  Circadian rhythm sleep disorder, shift work type  Ambulatory referral to [facility]   7  Obesity (BMI 30-39  9)  Ambulatory referral to [facility]   8   Pre-operative laboratory examination  Comprehensive metabolic panel    CBC and differential    PAT Covid Screening    Protime-INR    APTT    MRSA culture    Ambulatory referral to [facility]    Ambulatory referral to Physical Therapy    EKG 12 lead    XR chest pa & lateral   9  Screening for viral disease  PAT Covid Screening    Ambulatory referral to [facility] Finnegan Carol Haro is a very pleasant 69-year-old gentleman very well known to our practice for his bilateral knee complaints  He is now 3 months status post a right total knee arthroplasty and has recovered very well  He does not have any significant activity related discomfort on a daily basis in his right knee  The prosthesis has remained stable on imaging and exam   However, his left knee does have severe end-stage underlying osteoarthritis and continues to affect his ability to perform activities of daily living  He has previously attempted and failed all forms of conservative treatment for his left knee  All risks and benefits of surgery including but not limited to bleeding, infection, stiffness, need for further surgery, leg-length discrepancy, persistent limp, damage to vessels or nerves, blood clots, heart attack, stroke, and death were discussed  Consents were signed and placed in the chart for a left total knee arthroplasty  He will need clearance from his primary care physician and some basic labs  He did not have any acute events after his right total knee arthroplasty, so he will not need a repeat clearance from Cardiology  He will again be a candidate for outpatient physical therapy postoperatively  He denies history of MRSA infection, DVT/ PE, diabetes, gastric ulcer or GI bleed, malignancy, and he is not a smoker  He was introduced today to our surgical scheduler for further planning  We look for to taking care of him in the near future  All of his questions were addressed today    Subjective: Bilateral knee follow up    Patient ID: Raul Walters is a 61 y o  male  Ozzieviolet Langford is a very pleasant 10year-old gentleman presenting today for follow-up 3 months after undergoing right total knee arthroplasty    He reports that he has done very well despite not consistently doing physical therapy  He does not have any significant activity related discomfort in the right knee anymore  He does feel a click on occasion and some soreness at the end of the day, but otherwise is very pleased with his progress overall  His left knee, which has known severe end-stage underlying osteoarthritis, has been much more symptomatic recently now that his right knee has improved  He does not feel that he is able to return to work because of his limitations due to his left knee  He is interested in pursuing a total knee arthroplasty  Review of Systems   Constitutional: Negative  HENT: Negative  Eyes: Negative  Respiratory: Negative  Cardiovascular: Negative  Gastrointestinal: Negative  Endocrine: Negative  Genitourinary: Negative  Musculoskeletal: Positive for arthralgias, gait problem, joint swelling and myalgias  Skin: Negative  Allergic/Immunologic: Negative  Hematological: Negative  Psychiatric/Behavioral: Negative          Past Medical History:   Diagnosis Date    Ankle edema 2015    Arthritis     Benign essential hypertension     COVID-19     2020    Obesity     Organic impotence     Seasonal allergies        Past Surgical History:   Procedure Laterality Date    JOINT REPLACEMENT Right 2021    right knee    AK TOTAL KNEE ARTHROPLASTY Right 2021    Procedure: ARTHROPLASTY KNEE TOTAL;  Surgeon: Kerline Mason DO;  Location: [facility];  Service: Orthopedics   [facility] MSK PROCEDURE  2020       Family History   Problem Relation Age of Onset    Alzheimer's disease Mother     Cancer Mother        Social History     Occupational History    Not on file   Tobacco Use    Smoking status: Former Smoker     Quit date: 1970     Years since quittin 7    Smokeless tobacco: Never Used   Substance and Sexual Activity    Alcohol use: Never     Alcohol/week: 0 0 standard drinks Frequency: Never     Binge frequency: Never    Drug use: Never    Sexual activity: Not on file         Current Outpatient Medications:     amLODIPine (NORVASC) 10 mg tablet, Take 1 tablet (10 mg total) by mouth daily, Disp: 90 tablet, Rfl: 1    ascorbic Acid (VITAMIN C) 500 MG CPCR, Take 1 capsule (500 mg total) by mouth 2 (two) times a day, Disp: 60 capsule, Rfl: 0    cholecalciferol (VITAMIN D3) 1,000 units tablet, Take 1 tablet (1,000 Units total) by mouth daily, Disp: 30 tablet, Rfl: 0    ferrous sulfate 324 (65 Fe) mg, Take 1 tablet (324 mg total) by mouth daily before breakfast, Disp: 30 tablet, Rfl: 0    fexofenadine (ALLEGRA) 60 MG tablet, TAKE 1 TABLET DAILY (Patient not taking: Reported on 1/20/2021), Disp: 90 tablet, Rfl: 3    fluticasone (FLONASE) 50 mcg/act nasal spray, USE 2 SPRAYS IN EACH NOSTRIL DAILY (Patient not taking: Reported on 1/20/2021), Disp: 48 g, Rfl: 3    folic acid (FOLVITE) 1 mg tablet, Take 1 tablet (1 mg total) by mouth daily, Disp: 30 tablet, Rfl: 0    PEG 3350-KCl-NaCl-NaSulf-Na Asc-C (MOVIPREP) 100 g, Take by mouth, Disp: , Rfl:     zinc sulfate (ZINCATE) 220 mg capsule, Take 1 capsule (220 mg total) by mouth daily, Disp: 30 capsule, Rfl: 0    No Known Allergies    Objective: There were no vitals filed for this visit  Body mass index is 36 65 kg/m²  Right Knee Exam     Muscle Strength   The patient has normal right knee strength  Tenderness   The patient is experiencing no tenderness  Range of Motion   Extension: normal Right knee extension: 0  Flexion: normal Right knee flexion: 120       Tests   Varus: negative Valgus: negative  Drawer:  Anterior - negative    Posterior - negative    Other   Erythema: absent  Scars: present (well healed anterior operative scar)  Sensation: normal  Pulse: present  Swelling: none  Effusion: no effusion present    Comments:   Prosthesis Stable at 0, 30 90°  Neurovascularly intact distally  No warmth, erythema or signs of infection  Patella tracks midline and flat  Thigh and calf soft nontender        Left Knee Exam     Muscle Strength   The patient has normal left knee strength  Tenderness   The patient is experiencing tenderness in the medial joint line, MCL, medial retinaculum and patella  Range of Motion   Extension:  5 abnormal   Flexion:  110 normal     Tests   Efren:  Medial - negative Lateral - negative  Varus: negative Valgus: negative  Drawer:  Anterior - negative     Posterior - negative  Patellar apprehension: negative    Other   Erythema: absent  Scars: absent  Sensation: normal  Pulse: present  Swelling: none  Effusion: no effusion present    Comments:   5 degree valgus deformity passively correctable in neutral   thigh and calf soft nontender   collateral ligaments stable at 0, 30, 90   ambulates with antalgic gait on the left and no assistive device   grossly distally neurovascular intact          Observations   Left Knee   Negative for effusion  Right Knee   Negative for effusion  Physical Exam  Vitals signs and nursing note reviewed  Constitutional:       Appearance: He is well-developed  Comments: Body mass index is 36 65 kg/m²  HENT:      Head: Normocephalic and atraumatic  Right Ear: External ear normal       Left Ear: External ear normal    Neck:      Musculoskeletal: Normal range of motion  Cardiovascular:      Rate and Rhythm: Normal rate  Pulmonary:      Effort: Pulmonary effort is normal    Abdominal:      Palpations: Abdomen is soft  Musculoskeletal:      Right knee: He exhibits no effusion  Left knee: He exhibits no effusion  Comments: See ortho exam   Skin:     General: Skin is warm and dry  Neurological:      Mental Status: He is alert and oriented to person, place, and time  Mental status is at baseline  Psychiatric:         Mood and Affect: Mood normal          Behavior: Behavior normal          Thought Content:  Thought content normal  Judgment: Judgment normal        I have personally reviewed pertinent films in PACS  Of the weight-bearing Mag marker x-rays taken today of his left knee which demonstrate severe end-stage degenerative changes with valgus deformity  He has bone-on-bone appearance of the lateral compartment with significant tricompartmental osteophytosis    His right total knee prosthesis remains well aligned and well cemented with no signs of loosening, periprosthetic fracture, or other complication

## 2021-04-01 ENCOUNTER — TELEPHONE (OUTPATIENT)
Dept: OBGYN CLINIC | Facility: CLINIC | Age: 61
End: 2021-04-01

## 2021-04-01 NOTE — TELEPHONE ENCOUNTER
[address] [facility] [address] [facility] [address] [facility] [address] Office to verify benefits and check if an authorization is needed  I called [phone]  I left a voice message for Annie Cason at [address]

## 2021-04-05 ENCOUNTER — TELEPHONE (OUTPATIENT)
Dept: OBGYN CLINIC | Facility: CLINIC | Age: 61
End: 2021-04-05

## 2021-04-05 NOTE — TELEPHONE ENCOUNTER
Perla Stallworth from [facility] returned your call on Monday, 4/5/21 @ 9:46 am    In reference to your call on Friday, 4/2 and precertification for Mr Nitin Ram  NO PRECERT - FOR JENNIFER rodriguez stating she left you a vm as well      Her number if you need to reach her back is [phone], ext [phone]

## 2021-04-09 ENCOUNTER — TRANSCRIBE ORDERS (OUTPATIENT)
Dept: ADMINISTRATIVE | Facility: HOSPITAL | Age: 61
End: 2021-04-09

## 2021-04-09 ENCOUNTER — APPOINTMENT (OUTPATIENT)
Dept: LAB | Facility: HOSPITAL | Age: 61
End: 2021-04-09

## 2021-04-09 ENCOUNTER — OFFICE VISIT (OUTPATIENT)
Dept: LAB | Facility: HOSPITAL | Age: 61
End: 2021-04-09
Payer: COMMERCIAL

## 2021-04-09 ENCOUNTER — HOSPITAL ENCOUNTER (OUTPATIENT)
Dept: RADIOLOGY | Facility: HOSPITAL | Age: 61
Discharge: HOME | End: 2021-04-09
Payer: COMMERCIAL

## 2021-04-09 DIAGNOSIS — Z01.812 PRE-OPERATIVE LABORATORY EXAMINATION: ICD-10-CM

## 2021-04-09 DIAGNOSIS — M25.562 CHRONIC PAIN OF LEFT KNEE: ICD-10-CM

## 2021-04-09 DIAGNOSIS — M17.12 PRIMARY LOCALIZED OSTEOARTHRITIS OF LEFT KNEE: ICD-10-CM

## 2021-04-09 DIAGNOSIS — G89.29 CHRONIC PAIN OF LEFT KNEE: ICD-10-CM

## 2021-04-09 LAB
ALBUMIN SERPL BCP-MCNC: 3.7 G/DL (ref 3.5–5)
ALP SERPL-CCNC: 78 U/L (ref 46–116)
ALT SERPL W P-5'-P-CCNC: 27 U/L (ref 12–78)
ANION GAP SERPL CALCULATED.3IONS-SCNC: 7 MMOL/L (ref 4–13)
APTT PPP: 27 SECONDS (ref 23–37)
AST SERPL W P-5'-P-CCNC: 20 U/L (ref 5–45)
BASOPHILS # BLD AUTO: 0.08 THOUSANDS/ΜL (ref 0–0.1)
BASOPHILS NFR BLD AUTO: 1 % (ref 0–1)
BILIRUB SERPL-MCNC: 0.36 MG/DL (ref 0.2–1)
BUN SERPL-MCNC: 12 MG/DL (ref 5–25)
CALCIUM SERPL-MCNC: 9.2 MG/DL (ref 8.3–10.1)
CHLORIDE SERPL-SCNC: 105 MMOL/L (ref 100–108)
CO2 SERPL-SCNC: 28 MMOL/L (ref 21–32)
CREAT SERPL-MCNC: 0.87 MG/DL (ref 0.6–1.3)
EOSINOPHIL # BLD AUTO: 0.11 THOUSAND/ΜL (ref 0–0.61)
EOSINOPHIL NFR BLD AUTO: 1 % (ref 0–6)
ERYTHROCYTE [DISTWIDTH] IN BLOOD BY AUTOMATED COUNT: 12.6 % (ref 11.6–15.1)
GFR SERPL CREATININE-BSD FRML MDRD: 94 ML/MIN/1.73SQ M
GLUCOSE P FAST SERPL-MCNC: 102 MG/DL (ref 65–99)
HCT VFR BLD AUTO: 49.9 % (ref 36.5–49.3)
HGB BLD-MCNC: 16.4 G/DL (ref 12–17)
IMM GRANULOCYTES # BLD AUTO: 0.24 THOUSAND/UL (ref 0–0.2)
IMM GRANULOCYTES NFR BLD AUTO: 2 % (ref 0–2)
INR PPP: 0.91 (ref 0.84–1.19)
LYMPHOCYTES # BLD AUTO: 3.11 THOUSANDS/ΜL (ref 0.6–4.47)
LYMPHOCYTES NFR BLD AUTO: 26 % (ref 14–44)
MCH RBC QN AUTO: 28.9 PG (ref 26.8–34.3)
MCHC RBC AUTO-ENTMCNC: 32.9 G/DL (ref 31.4–37.4)
MCV RBC AUTO: 88 FL (ref 82–98)
MONOCYTES # BLD AUTO: 1.06 THOUSAND/ΜL (ref 0.17–1.22)
MONOCYTES NFR BLD AUTO: 9 % (ref 4–12)
NEUTROPHILS # BLD AUTO: 7.5 THOUSANDS/ΜL (ref 1.85–7.62)
NEUTS SEG NFR BLD AUTO: 61 % (ref 43–75)
NRBC BLD AUTO-RTO: 0 /100 WBCS
PLATELET # BLD AUTO: 304 THOUSANDS/UL (ref 149–390)
PMV BLD AUTO: 8.8 FL (ref 8.9–12.7)
POTASSIUM SERPL-SCNC: 4.7 MMOL/L (ref 3.5–5.3)
PROT SERPL-MCNC: 7.8 G/DL (ref 6.4–8.2)
PROTHROMBIN TIME: 12.2 SECONDS (ref 11.6–14.5)
RBC # BLD AUTO: 5.68 MILLION/UL (ref 3.88–5.62)
SODIUM SERPL-SCNC: 140 MMOL/L (ref 136–145)
WBC # BLD AUTO: 12.1 THOUSAND/UL (ref 4.31–10.16)

## 2021-04-09 PROCEDURE — 85730 THROMBOPLASTIN TIME PARTIAL: CPT

## 2021-04-09 PROCEDURE — 87081 CULTURE SCREEN ONLY: CPT

## 2021-04-09 PROCEDURE — 36415 COLL VENOUS BLD VENIPUNCTURE: CPT

## 2021-04-09 PROCEDURE — 85610 PROTHROMBIN TIME: CPT

## 2021-04-09 PROCEDURE — 85025 COMPLETE CBC W/AUTO DIFF WBC: CPT

## 2021-04-09 PROCEDURE — 71046 X-RAY EXAM CHEST 2 VIEWS: CPT

## 2021-04-09 PROCEDURE — 93005 ELECTROCARDIOGRAM TRACING: CPT

## 2021-04-09 PROCEDURE — 80053 COMPREHEN METABOLIC PANEL: CPT

## 2021-04-10 LAB — MRSA NOSE QL CULT: NORMAL

## 2021-04-11 LAB
ATRIAL RATE: 74 BPM
P AXIS: 39 DEGREES
PR INTERVAL: 158 MS
QRS AXIS: -5 DEGREES
QRSD INTERVAL: 74 MS
QT INTERVAL: 366 MS
QTC INTERVAL: 406 MS
T WAVE AXIS: 17 DEGREES
VENTRICULAR RATE: 74 BPM

## 2021-04-11 PROCEDURE — 93010 ELECTROCARDIOGRAM REPORT: CPT | Performed by: INTERNAL MEDICINE

## 2021-04-13 ENCOUNTER — TELEPHONE (OUTPATIENT)
Dept: OBGYN CLINIC | Facility: HOSPITAL | Age: 61
End: 2021-04-13

## 2021-04-13 NOTE — TELEPHONE ENCOUNTER
Pt contacted today to complete a preoperative elective admission assessment  Attempted to leave VM for patient, but his mailbox is full and can't accept messages at this time

## 2021-04-14 ENCOUNTER — RA CDI HCC (OUTPATIENT)
Dept: OTHER | Facility: HOSPITAL | Age: 61
End: 2021-04-14

## 2021-04-14 NOTE — PROGRESS NOTES
[facility]  coding opportunities          Chart reviewed, no opportunity found: CHART REVIEWED, NO OPPORTUNITY FOUND              Patients insurance company: [employer] (Medicare and Commercial for [employer] and [employer])

## 2021-04-15 NOTE — TELEPHONE ENCOUNTER
Attempted patient again for a preoperative elective admission assessment  Pt had recent joint in January, I would like to confirm that assessment with patient  Attempted to leave  as patient did not answer, but  is full

## 2021-04-21 ENCOUNTER — APPOINTMENT (OUTPATIENT)
Dept: RADIOLOGY | Facility: CLINIC | Age: 61
End: 2021-04-21
Payer: COMMERCIAL

## 2021-04-21 ENCOUNTER — TELEPHONE (OUTPATIENT)
Dept: FAMILY MEDICINE CLINIC | Facility: CLINIC | Age: 61
End: 2021-04-21

## 2021-04-21 ENCOUNTER — OFFICE VISIT (OUTPATIENT)
Dept: FAMILY MEDICINE CLINIC | Facility: CLINIC | Age: 61
End: 2021-04-21
Payer: COMMERCIAL

## 2021-04-21 VITALS
OXYGEN SATURATION: 97 % | WEIGHT: 282 LBS | BODY MASS INDEX: 38.19 KG/M2 | TEMPERATURE: 99.9 F | HEIGHT: 72 IN | SYSTOLIC BLOOD PRESSURE: 144 MMHG | RESPIRATION RATE: 20 BRPM | HEART RATE: 98 BPM | DIASTOLIC BLOOD PRESSURE: 70 MMHG

## 2021-04-21 DIAGNOSIS — Z96.651 S/P TOTAL KNEE REPLACEMENT USING CEMENT, RIGHT: ICD-10-CM

## 2021-04-21 DIAGNOSIS — Z11.59 SCREENING FOR VIRAL DISEASE: ICD-10-CM

## 2021-04-21 DIAGNOSIS — Z01.812 PRE-OPERATIVE LABORATORY EXAMINATION: ICD-10-CM

## 2021-04-21 DIAGNOSIS — G89.29 CHRONIC PAIN OF LEFT KNEE: ICD-10-CM

## 2021-04-21 DIAGNOSIS — J06.9 UPPER RESPIRATORY TRACT INFECTION, UNSPECIFIED TYPE: ICD-10-CM

## 2021-04-21 DIAGNOSIS — I10 BENIGN ESSENTIAL HYPERTENSION: ICD-10-CM

## 2021-04-21 DIAGNOSIS — M17.12 PRIMARY LOCALIZED OSTEOARTHRITIS OF LEFT KNEE: ICD-10-CM

## 2021-04-21 DIAGNOSIS — U07.1 COVID-19: Primary | ICD-10-CM

## 2021-04-21 DIAGNOSIS — M25.562 CHRONIC PAIN OF LEFT KNEE: ICD-10-CM

## 2021-04-21 DIAGNOSIS — M17.12 PRIMARY OSTEOARTHRITIS OF LEFT KNEE: Primary | ICD-10-CM

## 2021-04-21 PROCEDURE — 1036F TOBACCO NON-USER: CPT | Performed by: FAMILY MEDICINE

## 2021-04-21 PROCEDURE — U0003 INFECTIOUS AGENT DETECTION BY NUCLEIC ACID (DNA OR RNA); SEVERE ACUTE RESPIRATORY SYNDROME CORONAVIRUS 2 (SARS-COV-2) (CORONAVIRUS DISEASE [COVID-19]), AMPLIFIED PROBE TECHNIQUE, MAKING USE OF HIGH THROUGHPUT TECHNOLOGIES AS DESCRIBED BY CMS-2020-01-R: HCPCS

## 2021-04-21 PROCEDURE — 3078F DIAST BP <80 MM HG: CPT | Performed by: FAMILY MEDICINE

## 2021-04-21 PROCEDURE — 99214 OFFICE O/P EST MOD 30 MIN: CPT | Performed by: FAMILY MEDICINE

## 2021-04-21 PROCEDURE — U0005 INFEC AGEN DETEC AMPLI PROBE: HCPCS

## 2021-04-21 PROCEDURE — 71046 X-RAY EXAM CHEST 2 VIEWS: CPT

## 2021-04-21 PROCEDURE — 3077F SYST BP >= 140 MM HG: CPT | Performed by: FAMILY MEDICINE

## 2021-04-21 PROCEDURE — 3008F BODY MASS INDEX DOCD: CPT | Performed by: FAMILY MEDICINE

## 2021-04-21 RX ORDER — AZITHROMYCIN 250 MG/1
TABLET, FILM COATED ORAL
Qty: 6 TABLET | Refills: 0 | Status: SHIPPED | OUTPATIENT
Start: 2021-04-21 | End: 2021-04-26

## 2021-04-21 RX ORDER — AMLODIPINE BESYLATE 10 MG/1
10 TABLET ORAL DAILY
Qty: 90 TABLET | Refills: 1 | Status: SHIPPED | OUTPATIENT
Start: 2021-04-21 | End: 2022-05-19 | Stop reason: SDUPTHER

## 2021-04-21 NOTE — PROGRESS NOTES
Assessment/Plan:    No problem-specific Assessment & Plan notes found for this encounter  Preop clearance was scheduled today, however he is feeling sick today    Today he appears sick but lungs are clear on exam, does have some exertional SOB but resting oxygen RA sat 97%    Will check CXR today-stat    Roshni Cashiers Roshni Cashiers Roshni Barnes reported normal and pt aware  covid test today    Roshni Barnes pending    Will also start him on zpack today and mucinex DM       If he is fever free on 4/26/21 and feeling better, I would not be against him proceeding with planned left TKR if ok with surgeon  Diagnoses and all orders for this visit:    Primary osteoarthritis of left knee    Upper respiratory tract infection, unspecified type  -     XR chest pa & lateral; Future  -     azithromycin (ZITHROMAX) 250 mg tablet; Take 500mg on day 1, 250mg on days 2-5    S/P total knee replacement using cement, right  -     amLODIPine (NORVASC) 10 mg tablet; Take 1 tablet (10 mg total) by mouth daily    Benign essential hypertension  -     amLODIPine (NORVASC) 10 mg tablet; Take 1 tablet (10 mg total) by mouth daily        No follow-ups on file  Subjective:      Patient ID: Cat Stoddard is a 61 y o  male  No chief complaint on file        HPI  Has IOP rehab for methamphetamine addiction for past 2 years  Missed a meeting today    This am sick  Fine yesterday  Very congested in head  Coughing  Harsh  Nonproductive cough  Some sob and wheezing now  No hemoptysis  No dizziness  No loss of taste/smell  No sick contacts anywhere or home  No fever  No chills or aches  Hx of allergies  No inhalation exposures    cxr neg on 4/9/21  Cbc done, wbc 12  fbs 102  Eats lots of bread/sandwiches    Planned procedure:  Left knee TKR  Surgeon:  Dr Roque Puri  Date:  4/27/21  Anesthesia type:  spinal    Prior major surgeries:  Right TKR, tonsillectomy  Problems with anesthesia in past:  n    Can walk 4 blocks or 2 flights of stairs:  y  History of excessive bleeding:  n  History of excessive clotting:  n  Personal history of DVT or Pe:  n    Taking NSAIDS:  none  Takings vitamins D or E or A or fish oil supplements:  D    Usual am medications:  Amlodipine 10mg which he should take the am of surgery    Labs reviewed  EKG looks similar to Jan 2021    The following portions of the patient's history were reviewed and updated as appropriate: allergies, current medications, past family history, past medical history, past social history, past surgical history and problem list     Review of Systems   Constitutional: Negative for fever  Respiratory: Positive for shortness of breath  Cardiovascular: Negative for chest pain  Current Outpatient Medications   Medication Sig Dispense Refill    amLODIPine (NORVASC) 10 mg tablet Take 1 tablet (10 mg total) by mouth daily 90 tablet 1    ascorbic Acid (VITAMIN C) 500 MG CPCR Take 1 capsule (500 mg total) by mouth 2 (two) times a day 60 capsule 0    azithromycin (ZITHROMAX) 250 mg tablet Take 500mg on day 1, 250mg on days 2-5 6 tablet 0    cholecalciferol (VITAMIN D3) 1,000 units tablet Take 1 tablet (1,000 Units total) by mouth daily 30 tablet 0    ferrous sulfate 324 (65 Fe) mg Take 1 tablet (324 mg total) by mouth daily before breakfast 30 tablet 0    folic acid (FOLVITE) 1 mg tablet Take 1 tablet (1 mg total) by mouth daily 30 tablet 0    zinc sulfate (ZINCATE) 220 mg capsule Take 1 capsule (220 mg total) by mouth daily 30 capsule 0     No current facility-administered medications for this visit  Objective:    /70   Pulse 98   Temp 99 9 °F (37 7 °C)   Resp 20   Ht 6' (1 829 m)   Wt 128 kg (282 lb)   SpO2 97%   BMI 38 25 kg/m²        Physical Exam  Vitals signs and nursing note reviewed  Constitutional:       General: He is not in acute distress  Appearance: He is well-developed  He is obese  HENT:      Head: Normocephalic        Right Ear: Tympanic membrane normal       Left Ear: Tympanic membrane normal  Nose: Congestion present  Mouth/Throat:      Mouth: Mucous membranes are moist       Pharynx: No oropharyngeal exudate  Eyes:      General: No scleral icterus  Conjunctiva/sclera: Conjunctivae normal    Neck:      Musculoskeletal: Neck supple  Cardiovascular:      Rate and Rhythm: Normal rate and regular rhythm  Heart sounds: No murmur  Pulmonary:      Effort: Pulmonary effort is normal  No respiratory distress  Breath sounds: No stridor  No wheezing  Abdominal:      Palpations: Abdomen is soft  Musculoskeletal:         General: No deformity  Skin:     General: Skin is warm and dry  Coloration: Skin is not pale  Neurological:      Mental Status: He is alert  Psychiatric:         Mood and Affect: Mood normal          Behavior: Behavior normal          Thought Content:  Thought content normal                 Alpha Donny, DO

## 2021-04-21 NOTE — LETTER
April 21, 2021     Patient: Zofia Dewittnt   YOB: 1960   Date of Visit: 4/21/2021       To Whom it May Concern:    Marielos Griffin is under my professional care  He was seen in my office on 4/21/2021  If you have any questions or concerns, please don't hesitate to call           Sincerely,          Hilda Santoyo DO        CC: No Recipients

## 2021-04-21 NOTE — Clinical Note
Conditional clearance for surgery as he is sick, upper respiratory infection, cxr neg today, covid test pending  Please see note in case you would rather postpone surgery pending symptom improvement or proceed if feeling better on 4/26/21

## 2021-04-22 LAB — SARS-COV-2 RNA RESP QL NAA+PROBE: NEGATIVE

## 2021-04-22 NOTE — PROGRESS NOTES
Called pt to make sure he knows about the postponed surgery and to offer f/u appt to recheck him to be sure he is ok for surgery  He did not answer but was going to offer visit (might have to be in-person since a lung examination would probably be important given his circumstances) in 10-14 days presuming he is feeling better

## 2021-04-23 ENCOUNTER — TELEPHONE (OUTPATIENT)
Dept: FAMILY MEDICINE CLINIC | Facility: CLINIC | Age: 61
End: 2021-04-23

## 2021-04-23 NOTE — TELEPHONE ENCOUNTER
Tried to call pt x 3   No answer  Trying to see how he is doing as Dr Jailyn Benavides communicated to me that pt may still be on schedule for TKR on 4/27/21 if the pt feels better on 4/26/21  LMOM that he should call us Monday am to see how he is feeling

## 2021-04-24 DIAGNOSIS — M17.12 PRIMARY LOCALIZED OSTEOARTHRITIS OF LEFT KNEE: ICD-10-CM

## 2021-04-24 DIAGNOSIS — G89.29 CHRONIC PAIN OF LEFT KNEE: ICD-10-CM

## 2021-04-24 DIAGNOSIS — M25.562 CHRONIC PAIN OF LEFT KNEE: ICD-10-CM

## 2021-04-25 ENCOUNTER — TELEPHONE (OUTPATIENT)
Dept: URGENT CARE | Facility: CLINIC | Age: 61
End: 2021-04-25

## 2021-04-25 RX ORDER — FOLIC ACID 1 MG/1
TABLET ORAL
Qty: 30 TABLET | Refills: 0 | Status: SHIPPED | OUTPATIENT
Start: 2021-04-25 | End: 2022-05-19

## 2021-04-25 RX ORDER — FERROUS SULFATE TAB EC 324 MG (65 MG FE EQUIVALENT) 324 (65 FE) MG
324 TABLET DELAYED RESPONSE ORAL
Qty: 30 TABLET | Refills: 0 | Status: SHIPPED | OUTPATIENT
Start: 2021-04-25 | End: 2021-05-24

## 2021-04-25 RX ORDER — VITAMIN B COMPLEX
TABLET ORAL
Qty: 30 TABLET | Refills: 0 | Status: SHIPPED | OUTPATIENT
Start: 2021-04-25 | End: 2021-05-23 | Stop reason: SDUPTHER

## 2021-04-26 ENCOUNTER — TELEPHONE (OUTPATIENT)
Dept: OBGYN CLINIC | Facility: CLINIC | Age: 61
End: 2021-04-26

## 2021-04-26 RX ORDER — SODIUM CHLORIDE, SODIUM LACTATE, POTASSIUM CHLORIDE, CALCIUM CHLORIDE 600; 310; 30; 20 MG/100ML; MG/100ML; MG/100ML; MG/100ML
125 INJECTION, SOLUTION INTRAVENOUS CONTINUOUS
Status: CANCELLED | OUTPATIENT
Start: 2021-04-27

## 2021-04-26 NOTE — TELEPHONE ENCOUNTER
[phone] benefits fund - [address] Left a voice message wanted to notify the fund Jeffery Marquez' surgery postponed to 5/18/21 due to an illness and wanted to verify he will have benefits at that time  Left my contact information asking for call back regarding benefits

## 2021-04-26 NOTE — TELEPHONE ENCOUNTER
Spoke to patient at the time of this note  Unfortunately, the azithromycin that was prescribed last Wednesday has not provided significant benefit  He still feels very sick  He would feel more comfortable postponing surgery, and I agree  Therefore, we will cancel his surgery for tomorrow and reschedule it for a later date  He understands that he will need clearance from his primary care physician before we are able to proceed    He expressed understanding all of his questions were addressed today

## 2021-04-27 NOTE — TELEPHONE ENCOUNTER
Cara Waller returned my call at 8:12 am  She states [place] has to file his Disability forms for March and April  Once received, he will have benefits for May  Advised to call back in May

## 2021-05-04 ENCOUNTER — OFFICE VISIT (OUTPATIENT)
Dept: FAMILY MEDICINE CLINIC | Facility: CLINIC | Age: 61
End: 2021-05-04

## 2021-05-04 VITALS
BODY MASS INDEX: 38.44 KG/M2 | SYSTOLIC BLOOD PRESSURE: 142 MMHG | HEART RATE: 91 BPM | OXYGEN SATURATION: 96 % | DIASTOLIC BLOOD PRESSURE: 80 MMHG | HEIGHT: 72 IN | TEMPERATURE: 98.8 F | RESPIRATION RATE: 18 BRPM | WEIGHT: 283.8 LBS

## 2021-05-04 DIAGNOSIS — I10 BENIGN ESSENTIAL HYPERTENSION: ICD-10-CM

## 2021-05-04 DIAGNOSIS — R05.9 COUGH: Primary | ICD-10-CM

## 2021-05-04 DIAGNOSIS — E66.9 OBESITY (BMI 30-39.9): ICD-10-CM

## 2021-05-04 DIAGNOSIS — Z12.11 COLON CANCER SCREENING: ICD-10-CM

## 2021-05-04 DIAGNOSIS — M17.12 PRIMARY OSTEOARTHRITIS OF LEFT KNEE: ICD-10-CM

## 2021-05-04 PROBLEM — J06.9 UPPER RESPIRATORY TRACT INFECTION: Status: RESOLVED | Noted: 2021-04-21 | Resolved: 2021-05-04

## 2021-05-04 PROCEDURE — 99214 OFFICE O/P EST MOD 30 MIN: CPT | Performed by: FAMILY MEDICINE

## 2021-05-04 RX ORDER — METHYLPREDNISOLONE 4 MG/1
TABLET ORAL
Qty: 21 TABLET | Refills: 0 | Status: SHIPPED | OUTPATIENT
Start: 2021-05-04 | End: 2021-05-10

## 2021-05-04 RX ORDER — BENZONATATE 200 MG/1
200 CAPSULE ORAL 3 TIMES DAILY PRN
Qty: 30 CAPSULE | Refills: 0 | Status: SHIPPED | OUTPATIENT
Start: 2021-05-04 | End: 2022-05-19

## 2021-05-04 NOTE — LETTER
May 4, 2021     Patient: Yovani Martinez   YOB: 1960   Date of Visit: 5/4/2021       To Whom it May Concern:    Alonso Redmond is under my professional care  He was seen in my office on 5/4/2021  Please excuse from Adam Delgadillo the week of 5/3/21  If you have any questions or concerns, please don't hesitate to call           Sincerely,          Zoey Jackson DO        CC: No Recipients

## 2021-05-04 NOTE — PROGRESS NOTES
Assessment/Plan:    No problem-specific Assessment & Plan notes found for this encounter  We discussed treating his cough symptoms with medrol pack and tessalon perles and mucinex  He is mostly better with some left over tracheitis/cough with normal lung exam     If he feels symptomatically better with the treatment, he can contact me via phone the day before the scheduled preop visit to cancel the visit and we agreed that I can write a clearance letter/addendum on his behalf to Dr Ignacio Henderson to medically clear for surgery  If he feels worse in the interim, he understands he should report it and surgery may need to be postponed    His hypertension is near goal, he will work on lifestyle measures of diet and exercise (as able) and continue medications to get to goal bp <140/90  He does have some b/l mild leg edema likely due to his obesity, inactivity and amlodipine  He declined another cxr today  I agree since his lungs are clear and his last cxr was normal      Diagnoses and all orders for this visit:    Cough  -     methylPREDNISolone 4 MG tablet therapy pack; Use as directed on package  -     benzonatate (TESSALON) 200 MG capsule; Take 1 capsule (200 mg total) by mouth 3 (three) times a day as needed for cough    Colon cancer screening  -     Ambulatory referral to Gastroenterology; Future    Obesity (BMI 30-39  9)    Benign essential hypertension    Primary osteoarthritis of left knee        Return for Next scheduled follow up  Subjective:      Patient ID: Cj Luque is a 61 y o  male      Chief Complaint   Patient presents with    Cough     jmcma       HPI  Ongoing cough  S/p zpack  No mucus  Using mucinex  No fevers  Some dizziness with coughing at times  Some headache at times  Better overall  Cough hurts at times  Gradually improving overall  Surgical date 5/18/21    Some wheezing at times  No nasal dc    Prednisone in past, does not recall any major intolerances  Risks of steroids advised including healing risk    The following portions of the patient's history were reviewed and updated as appropriate: allergies, current medications, past family history, past medical history, past social history, past surgical history and problem list     Review of Systems   Constitutional: Negative for fever  Respiratory: Positive for cough  Negative for shortness of breath and wheezing  Cardiovascular: Negative for chest pain  Current Outpatient Medications   Medication Sig Dispense Refill    amLODIPine (NORVASC) 10 mg tablet Take 1 tablet (10 mg total) by mouth daily 90 tablet 1    ascorbic Acid (VITAMIN C) 500 MG CPCR Take 1 capsule (500 mg total) by mouth 2 (two) times a day 60 capsule 0    cholecalciferol (VITAMIN D3) 25 mcg (1,000 units) tablet TAKE 1 TABLET BY MOUTH EVERY DAY 30 tablet 0    ferrous sulfate 324 (65 Fe) mg TAKE 1 TABLET (324 MG TOTAL) BY MOUTH DAILY BEFORE BREAKFAST 30 tablet 0    folic acid (FOLVITE) 1 mg tablet TAKE 1 TABLET BY MOUTH EVERY DAY 30 tablet 0    zinc sulfate (ZINCATE) 220 mg capsule Take 1 capsule (220 mg total) by mouth daily 30 capsule 0    benzonatate (TESSALON) 200 MG capsule Take 1 capsule (200 mg total) by mouth 3 (three) times a day as needed for cough 30 capsule 0    methylPREDNISolone 4 MG tablet therapy pack Use as directed on package 21 tablet 0     No current facility-administered medications for this visit  Objective:    /80   Pulse 91   Temp 98 8 °F (37 1 °C)   Resp 18   Ht 6' (1 829 m)   Wt 129 kg (283 lb 12 8 oz)   SpO2 96%   BMI 38 49 kg/m²        Physical Exam  Vitals signs and nursing note reviewed  Constitutional:       General: He is not in acute distress  Appearance: He is well-developed  He is obese  He is not ill-appearing  HENT:      Head: Normocephalic  Right Ear: There is no impacted cerumen  Left Ear: There is no impacted cerumen  Eyes:      General: No scleral icterus  Conjunctiva/sclera: Conjunctivae normal    Neck:      Musculoskeletal: Neck supple  Cardiovascular:      Rate and Rhythm: Normal rate and regular rhythm  Comments: Some ectopy  Pulmonary:      Effort: Pulmonary effort is normal  No respiratory distress  Abdominal:      Palpations: Abdomen is soft  Musculoskeletal:         General: No deformity  Skin:     General: Skin is warm and dry  Coloration: Skin is not pale  Neurological:      Mental Status: He is alert  Gait: Gait abnormal    Psychiatric:         Mood and Affect: Mood normal          Behavior: Behavior normal          Thought Content: Thought content normal          BMI Counseling: Body mass index is 38 49 kg/m²  The BMI is above normal  Nutrition recommendations include decreasing portion sizes and moderation in carbohydrate intake  Exercise recommendations include exercising 3-5 times per week  No pharmacotherapy was ordered                Viola Lowery DO

## 2021-05-06 ENCOUNTER — RA CDI HCC (OUTPATIENT)
Dept: OTHER | Facility: HOSPITAL | Age: 61
End: 2021-05-06

## 2021-05-06 NOTE — PROGRESS NOTES
[facility]  coding opportunities          Chart reviewed, no opportunity found: CHART REVIEWED, NO OPPORTUNITY FOUND              Patients insurance company: [employer] (Medicare and Commercial for [employer] and [employer])

## 2021-05-11 ENCOUNTER — TELEPHONE (OUTPATIENT)
Dept: OBGYN CLINIC | Facility: CLINIC | Age: 61
End: 2021-05-11

## 2021-05-11 NOTE — TELEPHONE ENCOUNTER
Called Jin Pulido to notify [place] office has not received necessary Disability paperwork to extend his health benefits for the month of May, according to Cara Waller at the [address] [place] [address] ext [phone]  He has to contact Dre ext [phone] at the [employer] to give her the necessary information ASAP  His voicemail box is full  Unable to leave message  I sent him an e-mail notifying, surgery scheduled next week, will have to be rescheduled without benefits

## 2021-05-11 NOTE — TELEPHONE ENCOUNTER
Contacted patient, asked how he was doing  He stated, not to good  Called Dr Debi Norwood, still coughing, will go tomorrow to see him, will get a chest x-ray  I advised him the [address] needs his disability paperwork to process for May's benefits  He understood, he will work on tomorrow and let me know how he makes out with Dr Debi Norwood

## 2021-05-12 ENCOUNTER — OFFICE VISIT (OUTPATIENT)
Dept: FAMILY MEDICINE CLINIC | Facility: CLINIC | Age: 61
End: 2021-05-12
Payer: COMMERCIAL

## 2021-05-12 VITALS
DIASTOLIC BLOOD PRESSURE: 86 MMHG | OXYGEN SATURATION: 96 % | BODY MASS INDEX: 38.33 KG/M2 | RESPIRATION RATE: 16 BRPM | SYSTOLIC BLOOD PRESSURE: 134 MMHG | HEIGHT: 72 IN | TEMPERATURE: 97.6 F | HEART RATE: 77 BPM | WEIGHT: 283 LBS

## 2021-05-12 DIAGNOSIS — R06.00 DOE (DYSPNEA ON EXERTION): Primary | ICD-10-CM

## 2021-05-12 DIAGNOSIS — I10 BENIGN ESSENTIAL HYPERTENSION: ICD-10-CM

## 2021-05-12 DIAGNOSIS — R05.9 COUGH: ICD-10-CM

## 2021-05-12 PROBLEM — R06.09 DOE (DYSPNEA ON EXERTION): Status: ACTIVE | Noted: 2021-05-12

## 2021-05-12 PROCEDURE — 99214 OFFICE O/P EST MOD 30 MIN: CPT | Performed by: FAMILY MEDICINE

## 2021-05-12 PROCEDURE — 1036F TOBACCO NON-USER: CPT | Performed by: FAMILY MEDICINE

## 2021-05-12 NOTE — PROGRESS NOTES
Assessment/Plan:    No problem-specific Assessment & Plan notes found for this encounter  Since he has ongoing respiratory issues, has ALVAREZ, he and I both feel uncomfortable with proceedig with surgery on 5/18/21 and suggest it be postponed again while doing further eval with Ct of chest and trial of advair and he will see how he feels in 7-10days  Diagnoses and all orders for this visit:    Benign essential hypertension    Cough  -     CTA chest pe study; Future  -     fluticasone-salmeterol (Wixela Inhub) 500-50 mcg/dose inhaler; Inhale 1 puff 2 (two) times a day Rinse mouth after use  ALVAREZ (dyspnea on exertion)  -     CTA chest pe study; Future  -     fluticasone-salmeterol (Wixela Inhub) 500-50 mcg/dose inhaler; Inhale 1 puff 2 (two) times a day Rinse mouth after use  No follow-ups on file  Subjective:      Patient ID: Timothy Carey is a 61 y o  male  Chief Complaint   Patient presents with    Pre-op Exam     mz cma       HPI  Still having sob  ALVAREZ and wheezing with going up stairs  Flat surface walking is ok  Still with cough  Harsh  Phlegm  Moist cough  White phlegm  No hemoptysis  No fever  Finished medrol  Distant hx of tob, over 40 years ago  Scheduled on 5/18/21 for surgery  Yesterday felt very bad and feels reluctant to proceed with surgery until he feels better    The following portions of the patient's history were reviewed and updated as appropriate: allergies, current medications, past family history, past medical history, past social history, past surgical history and problem list     Review of Systems   Respiratory: Positive for cough and shortness of breath            Current Outpatient Medications   Medication Sig Dispense Refill    amLODIPine (NORVASC) 10 mg tablet Take 1 tablet (10 mg total) by mouth daily 90 tablet 1    benzonatate (TESSALON) 200 MG capsule Take 1 capsule (200 mg total) by mouth 3 (three) times a day as needed for cough 30 capsule 0    cholecalciferol (VITAMIN D3) 25 mcg (1,000 units) tablet TAKE 1 TABLET BY MOUTH EVERY DAY 30 tablet 0    ferrous sulfate 324 (65 Fe) mg TAKE 1 TABLET (324 MG TOTAL) BY MOUTH DAILY BEFORE BREAKFAST 30 tablet 0    folic acid (FOLVITE) 1 mg tablet TAKE 1 TABLET BY MOUTH EVERY DAY 30 tablet 0    zinc sulfate (ZINCATE) 220 mg capsule Take 1 capsule (220 mg total) by mouth daily 30 capsule 0    ascorbic Acid (VITAMIN C) 500 MG CPCR Take 1 capsule (500 mg total) by mouth 2 (two) times a day 60 capsule 0    fluticasone-salmeterol (Wixela Inhub) 500-50 mcg/dose inhaler Inhale 1 puff 2 (two) times a day Rinse mouth after use  1 Inhaler 1     No current facility-administered medications for this visit  Objective:    /86   Pulse 77   Temp 97 6 °F (36 4 °C)   Resp 16   Ht 6' (1 829 m)   Wt 128 kg (283 lb)   SpO2 96%   BMI 38 38 kg/m²        Physical Exam  Vitals signs and nursing note reviewed  Constitutional:       Appearance: He is well-developed  He is obese  He is not ill-appearing  HENT:      Head: Normocephalic  Eyes:      General: No scleral icterus  Conjunctiva/sclera: Conjunctivae normal    Neck:      Musculoskeletal: Neck supple  Cardiovascular:      Rate and Rhythm: Normal rate and regular rhythm  Heart sounds: No murmur  Pulmonary:      Effort: Pulmonary effort is normal  No respiratory distress  Breath sounds: Normal breath sounds  No stridor  No wheezing or rales  Abdominal:      Palpations: Abdomen is soft  Tenderness: There is no abdominal tenderness  Musculoskeletal:         General: No deformity  Skin:     General: Skin is warm and dry  Coloration: Skin is not jaundiced or pale  Neurological:      Mental Status: He is alert  Psychiatric:         Mood and Affect: Mood normal          Behavior: Behavior normal          Thought Content:  Thought content normal                 Alejo Weeks DO

## 2021-05-12 NOTE — Clinical Note
He is still not feeling well with shortness of breath and is reluctant to keep surgical date as scheduled until he feels better

## 2021-05-12 NOTE — LETTER
May 12, 2021     Patient: Cabrera Webster   YOB: 1960   Date of Visit: 5/12/2021       To Whom it May Concern:    Vito Minna is under my professional care  He was seen in my office on 5/12/2021  Excuse from group meetings the week of 5/10/21  If you have any questions or concerns, please don't hesitate to call           Sincerely,          Angelito Lopez, DO        CC: No Recipients

## 2021-05-19 ENCOUNTER — HOSPITAL ENCOUNTER (OUTPATIENT)
Dept: RADIOLOGY | Facility: HOSPITAL | Age: 61
Discharge: HOME | End: 2021-05-19
Attending: FAMILY MEDICINE

## 2021-05-19 ENCOUNTER — TELEPHONE (OUTPATIENT)
Dept: FAMILY MEDICINE CLINIC | Facility: CLINIC | Age: 61
End: 2021-05-19

## 2021-05-19 DIAGNOSIS — R05.9 COUGH: ICD-10-CM

## 2021-05-19 DIAGNOSIS — R06.00 DOE (DYSPNEA ON EXERTION): ICD-10-CM

## 2021-05-19 PROCEDURE — 71275 CT ANGIOGRAPHY CHEST: CPT

## 2021-05-19 PROCEDURE — G1004 CDSM NDSC: HCPCS

## 2021-05-19 RX ADMIN — IOHEXOL 85 ML: 350 INJECTION, SOLUTION INTRAVENOUS at 11:29

## 2021-05-19 NOTE — TELEPHONE ENCOUNTER
sw pt  Aware of normal CT results  Does feel a little better  Coughs less often  Advised he schedule preop clearance when he feels better/comfortable then if cleared, contact Dr Kj Najera for any preop testing requirements  Pt agreeable

## 2021-05-19 NOTE — TELEPHONE ENCOUNTER
CTA chest pe study  Status: Final result     Looking for test results from today  Dr Carreno Alu patient

## 2021-05-23 DIAGNOSIS — M17.12 PRIMARY LOCALIZED OSTEOARTHRITIS OF LEFT KNEE: ICD-10-CM

## 2021-05-23 DIAGNOSIS — M25.562 CHRONIC PAIN OF LEFT KNEE: ICD-10-CM

## 2021-05-23 DIAGNOSIS — G89.29 CHRONIC PAIN OF LEFT KNEE: ICD-10-CM

## 2021-05-23 RX ORDER — MELATONIN
Qty: 30 TABLET | Refills: 0 | Status: SHIPPED | OUTPATIENT
Start: 2021-05-23 | End: 2021-06-06 | Stop reason: SDUPTHER

## 2021-05-24 RX ORDER — FERROUS SULFATE TAB EC 324 MG (65 MG FE EQUIVALENT) 324 (65 FE) MG
324 TABLET DELAYED RESPONSE ORAL
Qty: 30 TABLET | Refills: 0 | Status: SHIPPED | OUTPATIENT
Start: 2021-05-24 | End: 2021-06-06 | Stop reason: SDUPTHER

## 2021-06-06 DIAGNOSIS — M17.12 PRIMARY LOCALIZED OSTEOARTHRITIS OF LEFT KNEE: ICD-10-CM

## 2021-06-06 DIAGNOSIS — M25.562 CHRONIC PAIN OF LEFT KNEE: ICD-10-CM

## 2021-06-06 DIAGNOSIS — G89.29 CHRONIC PAIN OF LEFT KNEE: ICD-10-CM

## 2021-06-06 RX ORDER — FERROUS SULFATE TAB EC 324 MG (65 MG FE EQUIVALENT) 324 (65 FE) MG
324 TABLET DELAYED RESPONSE ORAL
Qty: 90 TABLET | Refills: 1 | Status: SHIPPED | OUTPATIENT
Start: 2021-06-06 | End: 2022-05-19

## 2021-06-06 RX ORDER — VITAMIN B COMPLEX
TABLET ORAL
Qty: 90 TABLET | Refills: 1 | Status: SHIPPED | OUTPATIENT
Start: 2021-06-06 | End: 2022-05-19

## 2021-06-11 ENCOUNTER — OFFICE VISIT (OUTPATIENT)
Dept: OBGYN CLINIC | Facility: CLINIC | Age: 61
End: 2021-06-11

## 2021-06-11 VITALS
WEIGHT: 289 LBS | SYSTOLIC BLOOD PRESSURE: 160 MMHG | HEIGHT: 72 IN | DIASTOLIC BLOOD PRESSURE: 90 MMHG | BODY MASS INDEX: 39.14 KG/M2 | HEART RATE: 98 BPM

## 2021-06-11 DIAGNOSIS — M17.12 PRIMARY LOCALIZED OSTEOARTHRITIS OF LEFT KNEE: Primary | ICD-10-CM

## 2021-06-11 DIAGNOSIS — G89.29 CHRONIC PAIN OF LEFT KNEE: ICD-10-CM

## 2021-06-11 DIAGNOSIS — M25.562 CHRONIC PAIN OF LEFT KNEE: ICD-10-CM

## 2021-06-11 PROCEDURE — 3008F BODY MASS INDEX DOCD: CPT | Performed by: FAMILY MEDICINE

## 2021-06-11 PROCEDURE — 99213 OFFICE O/P EST LOW 20 MIN: CPT | Performed by: ORTHOPAEDIC SURGERY

## 2021-06-11 NOTE — PROGRESS NOTES
Assessment/Plan:  1  Primary localized osteoarthritis of left knee  CBC and differential    Comprehensive metabolic panel    PAT Covid Screening   2  Chronic pain of left knee  CBC and differential    Comprehensive metabolic panel    PAT Covid Screening      Baudilio Garcia is a very pleasant 27-year-old gentleman very well known to our practice for his activity related pain due to his severe underlying osteoarthritis  He now is feeling much better from the illness that prevented him from proceeding with surgery previously  He would like to get back on the schedule as soon as possible  Based on his previous lab results, we would only like him to go for a CBC, CMP, and repeat COVID screen before testing  He will also need official clearance from his primary care physician that he is medically ready for surgery  He met today with our surgical scheduler for further planning  We look for to taking care of him in the near future    Subjective: left knee follow up    Patient ID: Arie Quiñonez is a 61 y o  male  Baudilio Garcia is a pleasant 27-year-old gentleman presenting today for follow-up of his activity related left knee pain due to severe underlying osteoarthritis  His total knee arthroplasty had to be postponed due to cough, chest pain, and feeling ill  He did work with his primary care provider and had a negative workup  His symptoms have now completely resolved and he is ready to proceed with surgery  He denies any new injuries or symptoms to the left knee  Review of Systems   Constitutional: Negative  HENT: Negative  Eyes: Negative  Respiratory: Negative  Cardiovascular: Negative  Gastrointestinal: Negative  Endocrine: Negative  Genitourinary: Negative  Musculoskeletal: Positive for arthralgias, joint swelling and myalgias  Skin: Negative  Allergic/Immunologic: Negative  Neurological: Negative  Hematological: Negative  Psychiatric/Behavioral: Negative            Past Medical History:   Diagnosis Date    Ankle edema 2015    Arthritis     Benign essential hypertension     COVID-19     2020    Obesity     Organic impotence     Seasonal allergies        Past Surgical History:   Procedure Laterality Date    JOINT REPLACEMENT Right 2021    right knee    ID TOTAL KNEE ARTHROPLASTY Right 2021    Procedure: ARTHROPLASTY KNEE TOTAL;  Surgeon: Shirin Hauser DO;  Location: [facility];  Service: Orthopedics   [facility] PROCEDURE  2020       Family History   Problem Relation Age of Onset    Alzheimer's disease Mother     Cancer Mother        Social History     Occupational History    Not on file   Tobacco Use    Smoking status: Former Smoker     Quit date: 1970     Years since quittin 8    Smokeless tobacco: Never Used   Substance and Sexual Activity    Alcohol use: Never     Alcohol/week: 0 0 standard drinks     Frequency: Never     Binge frequency: Never    Drug use: Never    Sexual activity: Not on file         Current Outpatient Medications:     amLODIPine (NORVASC) 10 mg tablet, Take 1 tablet (10 mg total) by mouth daily, Disp: 90 tablet, Rfl: 1    ascorbic Acid (VITAMIN C) 500 MG CPCR, Take 1 capsule (500 mg total) by mouth 2 (two) times a day, Disp: 60 capsule, Rfl: 0    benzonatate (TESSALON) 200 MG capsule, Take 1 capsule (200 mg total) by mouth 3 (three) times a day as needed for cough, Disp: 30 capsule, Rfl: 0    cholecalciferol (VITAMIN D3) 25 mcg (1,000 units) tablet, TAKE 1 TABLET BY MOUTH EVERY DAY, Disp: 90 tablet, Rfl: 1    ferrous sulfate 324 (65 Fe) mg, TAKE 1 TABLET (324 MG TOTAL) BY MOUTH DAILY BEFORE BREAKFAST, Disp: 90 tablet, Rfl: 1    fluticasone-salmeterol (Wixela Inhub) 500-50 mcg/dose inhaler, Inhale 1 puff 2 (two) times a day Rinse mouth after use , Disp: 1 Inhaler, Rfl: 1    folic acid (FOLVITE) 1 mg tablet, TAKE 1 TABLET BY MOUTH EVERY DAY, Disp: 30 tablet, Rfl: 0    zinc sulfate (ZINCATE) 220 mg capsule, Take 1 capsule (220 mg total) by mouth daily, Disp: 30 capsule, Rfl: 0    No Known Allergies    Objective:  Vitals:    06/11/21 1032   BP: 160/90   Pulse: 98       Body mass index is 39 2 kg/m²  Left Knee Exam     Muscle Strength   The patient has normal left knee strength  Tenderness   The patient is experiencing tenderness in the medial joint line, MCL, medial retinaculum and patella  Range of Motion   Extension:  5 abnormal   Flexion:  110 normal     Tests   Efren:  Medial - negative Lateral - negative  Varus: negative Valgus: negative  Drawer:  Anterior - negative     Posterior - negative  Patellar apprehension: negative    Other   Erythema: absent  Scars: absent  Sensation: normal  Pulse: present  Swelling: none  Effusion: no effusion present    Comments:   5 degree valgus deformity passively correctable in neutral   thigh and calf soft nontender   collateral ligaments stable at 0, 30, 90   ambulates with antalgic gait on the left and no assistive device   grossly distally neurovascular intact          Observations   Left Knee   Negative for effusion  Physical Exam  Vitals signs and nursing note reviewed  Constitutional:       Appearance: He is well-developed  Comments: Body mass index is 39 2 kg/m²  HENT:      Head: Normocephalic and atraumatic  Right Ear: External ear normal       Left Ear: External ear normal    Neck:      Musculoskeletal: Normal range of motion  Cardiovascular:      Rate and Rhythm: Normal rate  Pulmonary:      Effort: Pulmonary effort is normal    Abdominal:      Palpations: Abdomen is soft  Musculoskeletal:      Left knee: He exhibits no effusion  Comments: See ortho exam   Skin:     General: Skin is warm and dry  Neurological:      Mental Status: He is alert and oriented to person, place, and time  Mental status is at baseline     Psychiatric:         Mood and Affect: Mood normal          Behavior: Behavior normal  Thought Content:  Thought content normal          Judgment: Judgment normal

## 2021-06-14 ENCOUNTER — TELEPHONE (OUTPATIENT)
Dept: OBGYN CLINIC | Facility: CLINIC | Age: 61
End: 2021-06-14

## 2021-06-14 NOTE — TELEPHONE ENCOUNTER
Last month, the [facility] stating his benefits were pending the receipt of necessary disability forms  While Jose Francisco Lynne was in my office Friday, I contacted the [facility] for a status of Ryan's benefits  Spoke with Danielle Ireland, was advised his benefits are still pending for May and June  He needs to send Disability Paper work to [facility] for benefits to be reinstated  I relayed the message to Jose Francisco Lynne  He verbally stated he understood  He advised he was going to contact his  who is handling the paperwork

## 2021-09-02 ENCOUNTER — TELEPHONE (OUTPATIENT)
Dept: OBGYN CLINIC | Facility: HOSPITAL | Age: 61
End: 2021-09-02

## 2021-09-02 NOTE — TELEPHONE ENCOUNTER
Patient called back returning phone call  Advised note is ready for    He advised he is going to stop by around 1230p-1pm

## 2021-09-02 NOTE — TELEPHONE ENCOUNTER
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

## 2022-01-04 ENCOUNTER — TELEPHONE (OUTPATIENT)
Dept: FAMILY MEDICINE CLINIC | Facility: CLINIC | Age: 62
End: 2022-01-04

## 2022-01-04 DIAGNOSIS — B34.9 VIRAL INFECTION, UNSPECIFIED: Primary | ICD-10-CM

## 2022-01-04 NOTE — TELEPHONE ENCOUNTER
Pt exposed over New year to covid  Pt dizzy and feels warm not sure if he has a fever doesn't have a thermometer  Pt requesting covid test  Please call when order is in the chart

## 2022-01-05 ENCOUNTER — TELEPHONE (OUTPATIENT)
Dept: FAMILY MEDICINE CLINIC | Facility: CLINIC | Age: 62
End: 2022-01-05

## 2022-01-05 PROCEDURE — U0003 INFECTIOUS AGENT DETECTION BY NUCLEIC ACID (DNA OR RNA); SEVERE ACUTE RESPIRATORY SYNDROME CORONAVIRUS 2 (SARS-COV-2) (CORONAVIRUS DISEASE [COVID-19]), AMPLIFIED PROBE TECHNIQUE, MAKING USE OF HIGH THROUGHPUT TECHNOLOGIES AS DESCRIBED BY CMS-2020-01-R: HCPCS | Performed by: FAMILY MEDICINE

## 2022-01-05 PROCEDURE — U0005 INFEC AGEN DETEC AMPLI PROBE: HCPCS | Performed by: FAMILY MEDICINE

## 2022-01-05 NOTE — TELEPHONE ENCOUNTER
There is an active order in his chart from yesterday  Please direct him to [facility] for testing     Devonte Coyne MA

## 2022-01-05 NOTE — TELEPHONE ENCOUNTER
Willem Rasmussen was exposed to covid on January 1st   He started with fatigue, sorethroat yesterday  Can we please put order in chart for patient       Thank You

## 2022-01-07 ENCOUNTER — TELEPHONE (OUTPATIENT)
Dept: FAMILY MEDICINE CLINIC | Facility: CLINIC | Age: 62
End: 2022-01-07

## 2022-04-08 ENCOUNTER — APPOINTMENT (OUTPATIENT)
Dept: RADIOLOGY | Facility: CLINIC | Age: 62
End: 2022-04-08
Payer: COMMERCIAL

## 2022-04-08 ENCOUNTER — OFFICE VISIT (OUTPATIENT)
Dept: OBGYN CLINIC | Facility: CLINIC | Age: 62
End: 2022-04-08
Payer: COMMERCIAL

## 2022-04-08 VITALS
SYSTOLIC BLOOD PRESSURE: 140 MMHG | HEIGHT: 72 IN | BODY MASS INDEX: 39.96 KG/M2 | WEIGHT: 295 LBS | DIASTOLIC BLOOD PRESSURE: 90 MMHG | HEART RATE: 72 BPM

## 2022-04-08 DIAGNOSIS — Z96.651 S/P TOTAL KNEE REPLACEMENT USING CEMENT, RIGHT: ICD-10-CM

## 2022-04-08 DIAGNOSIS — M17.12 PRIMARY LOCALIZED OSTEOARTHRITIS OF LEFT KNEE: Primary | ICD-10-CM

## 2022-04-08 DIAGNOSIS — M17.12 PRIMARY LOCALIZED OSTEOARTHRITIS OF LEFT KNEE: ICD-10-CM

## 2022-04-08 PROCEDURE — 99213 OFFICE O/P EST LOW 20 MIN: CPT | Performed by: ORTHOPAEDIC SURGERY

## 2022-04-08 PROCEDURE — 3077F SYST BP >= 140 MM HG: CPT | Performed by: ORTHOPAEDIC SURGERY

## 2022-04-08 PROCEDURE — 3080F DIAST BP >= 90 MM HG: CPT | Performed by: ORTHOPAEDIC SURGERY

## 2022-04-08 PROCEDURE — 3008F BODY MASS INDEX DOCD: CPT | Performed by: ORTHOPAEDIC SURGERY

## 2022-04-08 PROCEDURE — 73562 X-RAY EXAM OF KNEE 3: CPT

## 2022-04-08 PROCEDURE — 1036F TOBACCO NON-USER: CPT | Performed by: ORTHOPAEDIC SURGERY

## 2022-04-08 NOTE — LETTER
April 8, 2022     Patient: Richard Ordonez   YOB: 1960   Date of Visit: 4/8/2022       To Whom it May Concern:    Rob Thalia is under my professional care  He was seen in my office on 4/8/2022  He may return to all work activities with no limitations or restrictions  If you have any questions or concerns, please don't hesitate to call           Sincerely,          Shawn Wyman DO        CC: No Recipients

## 2022-05-09 ENCOUNTER — TELEPHONE (OUTPATIENT)
Dept: INTERNAL MEDICINE CLINIC | Facility: CLINIC | Age: 62
End: 2022-05-09

## 2022-05-19 ENCOUNTER — OFFICE VISIT (OUTPATIENT)
Dept: FAMILY MEDICINE CLINIC | Facility: CLINIC | Age: 62
End: 2022-05-19
Payer: COMMERCIAL

## 2022-05-19 VITALS
OXYGEN SATURATION: 97 % | SYSTOLIC BLOOD PRESSURE: 160 MMHG | HEIGHT: 71 IN | WEIGHT: 291 LBS | DIASTOLIC BLOOD PRESSURE: 94 MMHG | HEART RATE: 87 BPM | TEMPERATURE: 97.8 F | BODY MASS INDEX: 40.74 KG/M2

## 2022-05-19 DIAGNOSIS — Z11.4 SCREENING FOR HIV (HUMAN IMMUNODEFICIENCY VIRUS): ICD-10-CM

## 2022-05-19 DIAGNOSIS — Z11.59 NEED FOR HEPATITIS C SCREENING TEST: ICD-10-CM

## 2022-05-19 DIAGNOSIS — Z12.11 SCREENING FOR COLON CANCER: ICD-10-CM

## 2022-05-19 DIAGNOSIS — I10 BENIGN ESSENTIAL HYPERTENSION: ICD-10-CM

## 2022-05-19 DIAGNOSIS — E66.01 MORBID OBESITY WITH BMI OF 40.0-44.9, ADULT (HCC): ICD-10-CM

## 2022-05-19 DIAGNOSIS — Z96.651 S/P TOTAL KNEE REPLACEMENT USING CEMENT, RIGHT: ICD-10-CM

## 2022-05-19 DIAGNOSIS — Z76.89 ENCOUNTER TO ESTABLISH CARE WITH NEW DOCTOR: Primary | ICD-10-CM

## 2022-05-19 DIAGNOSIS — F17.210 CONTINUOUS DEPENDENCE ON CIGARETTE SMOKING: ICD-10-CM

## 2022-05-19 PROBLEM — F17.200 SMOKING: Status: ACTIVE | Noted: 2022-05-19

## 2022-05-19 PROBLEM — IMO0001 SMOKING: Status: ACTIVE | Noted: 2022-05-19

## 2022-05-19 PROCEDURE — 3008F BODY MASS INDEX DOCD: CPT | Performed by: FAMILY MEDICINE

## 2022-05-19 PROCEDURE — 99203 OFFICE O/P NEW LOW 30 MIN: CPT | Performed by: FAMILY MEDICINE

## 2022-05-19 PROCEDURE — 3725F SCREEN DEPRESSION PERFORMED: CPT | Performed by: FAMILY MEDICINE

## 2022-05-19 RX ORDER — AMLODIPINE BESYLATE 10 MG/1
10 TABLET ORAL DAILY
Qty: 90 TABLET | Refills: 1 | Status: SHIPPED | OUTPATIENT
Start: 2022-05-19 | End: 2022-08-08

## 2022-05-19 NOTE — PROGRESS NOTES
Assessment/Plan:      Diagnoses and all orders for this visit:    Encounter to establish care with new doctor  -     CBC and differential; Future  -     CBC and differential    Benign essential hypertension  -     amLODIPine (NORVASC) 10 mg tablet; Take 1 tablet (10 mg total) by mouth in the morning   -     CBC and differential; Future  -     CBC and differential    Need for hepatitis C screening test  -     Hepatitis C Antibody ([facility]); Future  -     Hepatitis C Antibody ([facility])    Screening for HIV (human immunodeficiency virus)  -     HIV 1/2 Antigen/Antibody (4th Generation) w Reflex [facility]; Future    S/P total knee replacement using cement, right  -     amLODIPine (NORVASC) 10 mg tablet; Take 1 tablet (10 mg total) by mouth in the morning  Morbid [facility] with BMI of 40 0-44 9, [facility]  -     Comprehensive metabolic panel; Future  -     HEMOGLOBIN A1C W/ EAG ESTIMATION; Future  -     Lipid Panel with Direct LDL reflex; Future  -     Comprehensive metabolic panel  -     HEMOGLOBIN A1C W/ EAG ESTIMATION  -     Lipid Panel with Direct LDL reflex    Screening for colon cancer  -     Ambulatory referral for colonoscopy; Future    Continuous dependence on cigarette smoking  -     nicotine polacrilex (NICORETTE) 2 mg gum; Chew 1 each (2 mg total)  as needed in the morning for smoking cessation  Patient here to establish care  Patient has history of hypertension however has been off of his medication due to insurance issues for few years  Will resume patient's listed hypertension medication (amlodipine 10 mg daily), follow-up for recheck in 2 weeks  Patient is a cigarette smoker  Patient would like something to help with the urges  Prescription for nicotine gum placed  Additionally, patient inquired about Cialis  Advised patient will first get blood pressure under control and will discuss at next visit      Will discuss blood work that was ordered at next visit as well     Subjective:     Patient ID: Subha Mendoza is a 64 y o  male  Hypertension  This is a chronic problem  The problem is uncontrolled  Pertinent negatives include no anxiety, blurred vision, chest pain, headaches, palpitations or shortness of breath  Risk factors for coronary artery disease include obesity, male gender and smoking/tobacco exposure  Past treatments include calcium channel blockers  Compliance problems include exercise and diet  Review of Systems   Constitutional: Negative for chills and fever  HENT: Negative for sore throat  Eyes: Negative for blurred vision  Respiratory: Negative for cough and shortness of breath  Cardiovascular: Negative for chest pain and palpitations  Gastrointestinal: Negative for abdominal pain, constipation, diarrhea, nausea and vomiting  Genitourinary: Negative for difficulty urinating and dysuria  Neurological: Negative for dizziness and headaches  Objective:  Vitals:    05/19/22 1626   BP: 160/94   Pulse: 87   Temp: 97 8 °F (36 6 °C)   SpO2: 97%          Physical Exam  Constitutional:       Appearance: Normal appearance  He is obese  HENT:      Head: Normocephalic and atraumatic  Cardiovascular:      Rate and Rhythm: Normal rate and regular rhythm  Heart sounds: Normal heart sounds  No murmur heard  Pulmonary:      Breath sounds: Normal breath sounds  No wheezing  Abdominal:      Palpations: Abdomen is soft  Tenderness: There is no abdominal tenderness  Musculoskeletal:      Right lower leg: No edema  Left lower leg: No edema  Neurological:      Mental Status: He is alert  BMI Counseling: Body mass index is 40 59 kg/m²  The BMI is above normal  Nutrition recommendations include reducing portion sizes, 3-5 servings of fruits/vegetables daily, reducing fast food intake and consuming healthier snacks   Exercise recommendations include moderate aerobic physical activity for 150 minutes/week and exercising 3-5 times per week

## 2022-05-19 NOTE — PATIENT INSTRUCTIONS
The MIND diet is a variation and combination of a healthy Mediterranean and DASH diet with scientific evidence to support neurological and mental health  Thus it is an excellent healthy eating pattern for many people with neurological disorders such as peripheral neuropathy, nerve injuries such as Bell's Palsy,  cerebrovascular accidents (strokes), cognitive impairment, early dementia  It mary carmen also be helpful in mental health issues such as depression, anxiety, neurodevelopmental disorders such as ADHD, Autism spectrum  Your doctor and/or nutritionist can individualize the  MIND diet to meet your specific clinical situation  Note that if your are diabetic and you are on blood sugar lowering medications such as insulin or sulfonylureas (like glipizide, etc), this diet is so effective at improving and even reversing diabetes that your need for medication may go way down  So be sure to monitor your blood sugar very closely and adjust down your medications if needed under the guidance of your physician  [email or web address]-content%[email or web address]%[email or web address] [email or web address]    Here are the key aspects of the MIND diet:    Eat whole, unprocessed food, mainly plants  'Eat the rainbow' - eat different color veggies to get the full range of their nutrients  Vegetables should be about 1/2 of the plate, 1/4 of plate a (mainly plant based) protein source such as tofu), 1/4 of plate healthy starch or carb such as a whole grain or root vegetable  Eat less animal products, and choose healthy sources, such as healthy fish (e g  sardines, mackerel, herring, flounder, high quality salmon)  No more than the size of a pack of cards of animal products per day  Avoid all industrially produced red meat and poultry       Eat plenty of these key foods that support brain, nerve and mental health:  1  Avocados  2  Beans  3  Blueberries and other berries  4  Broccoli, Kale and other leafy greens are key: have a minimum of 1 serving a day in addition to other veggies  5  Extra-virgin olive oil  6  Flax seed (grind just prior to eating and toss on breakfast cereal, in salads)  Don't heat it because the omega-3 fatty acids are very delicate  7  Herbal teas: hibiscus, lemon balm, mint, etc   8  Fresh herbs & spices like cilantro, dill, rosemary, thyme, oregano, basil, mint, parsley; cinnamon, oregano, marjoram, allspice, saffron and others  Turmeric is especially anti-inflammatory - adding a small amount of black pepper will help its absorption  Many herbs and spices are rich in anti-oxidants  9  Nuts & seeds, e g  almonds, walnuts, nery seeds, pumpkin, sunflower seeds  10  Whole grains: oats, buckwheat, millet, teff, sorghum, amaranth  Veldon Smyer is the only grain that is a complete protein source and therefore is a great staple grain  Whole wheat in moderation is ok  AVOID all white flour products, as they are often largely empty calories deficient in micronutrients and are high in glycemic index that induce inflammation and over-eating  11  Sweet potatoes (yams) are preferred over white potatoes  AVOID or at least minimize all these:  1  Processed foods: chips, cookies, frozen dinners, white bread, bagels, pastries, sweets and similar bakery products   2  Fast food, fried foods  Avoid all fast food restaurants like [employer], [employer] [place], [place], [facility], [facility] take-out and similar cheap unhealthy food establishments  3  Processed and industrially produced meats: story, salami, pastrami, hot dogs, luncheon meats are filled with saturated fat and sodium that induce nerve and brain inflammation and damage  Occasional free range poultry or grass fed bison or wild game meat like venison (e g  once or twice a week, 6 oz  -  the size of a pack of cards) is probably ok   Do not eat regular chicken, which is the main source of cholesterol in the standard American diet and a major contributor to obesity and nerve/brain inflammation  4  Butter or margarine  These are high in saturated or dangerous trans fats  5  Cheese is high in saturated fat  Use not at all or minimally, such as a little sprinkle of a highly flavored cheese on food  Don't eat any food in which cheese is a main ingredient  6  Sugary drinks such as soda  Also avoid artificially sweetened sodas, which confuse our energy balancing mechanism and disturb our gut microbiome  7  Excess alcohol  Don't drink at all if there is a contraindication to drinking such as alcohol use disorder, liver disease, etc , Men should never exceed two drinks per day, women no more than 1 drink per day  A drink - 1 small glass of wine, 1 12 oz  Beer or 1 shot of hard liquor  Here are some resources to learn more about the MIND diet and improving brain and nerve health:  1  The Alzheimer's Solution by Avery Treviño and Cathy Lacy MD  Copyright 2017  Reviews healthy lifestyle as relates to brain health  Has nice recipes in back of book  2  How not to Die by Andie Lopez MD Copyright 7127  Evidence based nutrition to help prevent and treat disease  Has a  Cookbook  Useful web site: NutritionFacts  org      Smoothie for brain & nerve health (could have one a day):  1 cup fresh kale or spinach or other dark leafy green  1 cup berries such as blueberries, blackberries or raspberries  1 very ripe banana or other fruit like clovis, or other fruit of your choice  1 - 2 tablespoons of freshly ground flaxseed  1/4 cup nuts such as walnuts, almonds, hazelnuts, etc    About 1 cup of water, milk, almond or soy milk, adjust for proper consistency    Can also add per your preference:  Freshly ground Major or other seeds  Various spices: a dash of nutmeg, 1/2 tsp or so of turmeric, cinnamon   cardamom, coriander, etc    Flavors such as vanilla, cocoa powder (with a little sugar, like 1 teaspoon for palatability if needed)    Have fun experimenting and adjusting to your taste! Obesity   AMBULATORY CARE:   Obesity  means your body mass index (BMI) is greater than 30  Your healthcare provider will use your height and weight to measure your BMI  The risks of obesity include  many health problems, including injuries or physical disability  · Diabetes (high blood sugar level)    · High blood pressure or high cholesterol    · Heart disease    · Stroke    · Gallbladder or liver disease    · Cancer of the colon, breast, prostate, liver, or kidney    · Sleep apnea    · Arthritis or gout    Screening  is done to check for health conditions before you have signs or symptoms  If you are 28to 79years old, your blood sugar level may be checked every 3 years for signs of prediabetes or diabetes  Your healthcare provider will check your blood pressure at each visit  High blood pressure can lead to a stroke or other problems  Your provider may check for signs of heart disease, cancer, or other health problems  Seek care immediately if:   · You have a severe headache, confusion, or difficulty speaking  · You have weakness on one side of your body  · You have chest pain, sweating, or shortness of breath  Call your doctor if:   · You have symptoms of gallbladder or liver disease, such as pain in your upper abdomen  · You have knee or hip pain and discomfort while walking  · You have symptoms of diabetes, such as intense hunger and thirst, and frequent urination  · You have symptoms of sleep apnea, such as snoring or daytime sleepiness  · You have questions or concerns about your condition or care  Treatment for obesity  focuses on helping you lose weight to improve your health  Even a small decrease in BMI can reduce the risk for many health problems   Your healthcare provider will help you set a weight-loss goal   · Lifestyle changes  are the first step in treating obesity  These include making healthy food choices and getting regular physical activity  Your healthcare provider may suggest a weight-loss program that involves coaching, education, and therapy  · Medicine  may help you lose weight when it is used with a healthy foods and physical activity  · Surgery  can help you lose weight if you are very obese and have other health problems  There are several types of weight-loss surgery  Ask your healthcare provider for more information  Tips for safe weight loss:   · Set small, realistic goals  An example of a small goal is to walk for 20 minutes 5 days a week  Anther goal is to lose 5% of your body weight  · Tell friends, family members, and coworkers about your goals  and ask for their support  Ask a friend to lose weight with you, or join a weight-loss support group  · Identify foods or triggers that may cause you to overeat , and find ways to avoid them  Remove tempting high-calorie foods from your home and workplace  Place a bowl of fresh fruit on your kitchen counter  If stress causes you to eat, then find other ways to cope with stress  A counselor or therapist may be able to help you  · Keep a diary to track what you eat and drink  Also write down how many minutes of physical activity you do each day  Weigh yourself once a week and record it in your diary  Eating changes: You will need to eat 500 to 1,000 fewer calories each day than you currently eat to lose 1 to 2 pounds a week  The following changes will help you cut calories:  · Eat smaller portions  Use small plates, no larger than 9 inches in diameter  Fill your plate half full of fruits and vegetables  Measure your food using measuring cups until you know what a serving size looks like  · Eat 3 meals and 1 or 2 snacks each day  Plan your meals in advance  Ang  and eat at home most of the time  Eat slowly  Do not skip meals   Skipping meals can lead to overeating later in the day  This can make it harder for you to lose weight  Talk with a dietitian to help you make a meal plan and schedule that is right for you  · Eat fruits and vegetables at every meal   They are low in calories and high in fiber, which makes you feel full  Do not add butter, margarine, or cream sauce to vegetables  Use herbs to season steamed vegetables  · Eat less fat and fewer fried foods  Eat more baked or grilled chicken and fish  These protein sources are lower in calories and fat than red meat  Limit fast food  Dress your salads with olive oil and vinegar instead of bottled dressing  · Limit the amount of sugar you eat  Do not drink sugary beverages  Limit alcohol  Activity changes:  Physical activity is good for your body in many ways  It helps you burn calories and build strong muscles  It decreases stress and depression, and improves your mood  It can also help you sleep better  Talk to your healthcare provider before you begin an exercise program   · Exercise for at least 30 minutes 5 days a week  Start slowly  Set aside time each day for physical activity that you enjoy and that is convenient for you  It is best to do both weight training and an activity that increases your heart rate, such as walking, bicycling, or swimming  · Find ways to be more active  Do yard work and housecleaning  Walk up the stairs instead of using elevators  Spend your leisure time going to events that require walking, such as outdoor festivals or fairs  This extra physical activity can help you lose weight and keep it off  Follow up with your doctor as directed: You may need to meet with a dietitian  Write down your questions so you remember to ask them during your visits  © Copyright [employer] 2022 Information is for End User's use only and may not be sold, redistributed or otherwise used for commercial purposes   All illustrations and images included in CareNotes® are the copyrighted property of [employer]  or [address]   The above information is an  only  It is not intended as medical advice for individual conditions or treatments  Talk to your doctor, nurse or pharmacist before following any medical regimen to see if it is safe and effective for you  Weight Management   AMBULATORY CARE:   Why it is important to manage your weight:  Being overweight increases your risk of health conditions such as heart disease, high blood pressure, type 2 diabetes, and certain types of cancer  It can also increase your risk for osteoarthritis, sleep apnea, and other respiratory problems  Aim for a slow, steady weight loss  Even a small amount of weight loss can lower your risk of health problems  Risks of being overweight:  Extra weight can cause many health problems, including the following:  · Diabetes (high blood sugar level)    · High blood pressure or high cholesterol    · Heart disease    · Stroke    · Gallbladder or liver disease    · Cancer of the colon, breast, prostate, liver, or kidney    · Sleep apnea    · Arthritis or gout    Screening  is done to check for health conditions before you have signs or symptoms  If you are 28to 79years old, your blood sugar level may be checked every 3 years for signs of prediabetes or diabetes  Your healthcare provider will check your blood pressure at each visit  High blood pressure can lead to a stroke or other problems  Your provider may check for signs of heart disease, cancer, or other health problems  How to lose weight safely:  A safe and healthy way to lose weight is to eat fewer calories and get regular exercise  · You can lose up about 1 pound a week by decreasing the number of calories you eat by 500 calories each day  You can decrease calories by eating smaller portion sizes or by cutting out high-calorie foods  Read labels to find out how many calories are in the foods you eat           · You can also burn calories with exercise such as walking, swimming, or biking  You will be more likely to keep weight off if you make these changes part of your lifestyle  Exercise at least 30 minutes per day on most days of the week  You can also fit in more physical activity by taking the stairs instead of the elevator or parking farther away from stores  Ask your healthcare provider about the best exercise plan for you  Healthy meal plan for weight management:  A healthy meal plan includes a variety of foods, contains fewer calories, and helps you stay healthy  A healthy meal plan includes the following:     · Eat whole-grain foods more often  A healthy meal plan should contain fiber  Fiber is the part of grains, fruits, and vegetables that is not broken down by your body  Whole-grain foods are healthy and provide extra fiber in your diet  Some examples of whole-grain foods are whole-wheat breads and pastas, oatmeal, brown rice, and bulgur  · Eat a variety of vegetables every day  Include dark, leafy greens such as spinach, kale, yinka greens, and mustard greens  Eat yellow and orange vegetables such as carrots, sweet potatoes, and winter squash  · Eat a variety of fruits every day  Choose fresh or canned fruit (canned in its own juice or light syrup) instead of juice  Fruit juice has very little or no fiber  · Eat low-fat dairy foods  Drink fat-free (skim) milk or 1% milk  Eat fat-free yogurt and low-fat cottage cheese  Try low-fat cheeses such as mozzarella and other reduced-fat cheeses  · Choose meat and other protein foods that are low in fat  Choose beans or other legumes such as split peas or lentils  Choose fish, skinless poultry (chicken or turkey), or lean cuts of red meat (beef or pork)  Before you cook meat or poultry, cut off any visible fat  · Use less fat and oil  Try baking foods instead of frying them  Add less fat, such as margarine, sour cream, regular salad dressing and mayonnaise to foods   Eat fewer high-fat foods  Some examples of high-fat foods include french fries, doughnuts, ice cream, and cakes  · Eat fewer sweets  Limit foods and drinks that are high in sugar  This includes candy, cookies, regular soda, and sweetened drinks  Ways to decrease calories:   · Eat smaller portions  ? Use a small plate with smaller servings  ? Do not eat second helpings  ? When you eat at a restaurant, ask for a box and place half of your meal in the box before you eat  ? Share an entrée with someone else  · Replace high-calorie snacks with healthy, low-calorie snacks  ? Choose fresh fruit, vegetables, fat-free rice cakes, or air-popped popcorn instead of potato chips, nuts, or chocolate  ? Choose water or calorie-free drinks instead of soda or sweetened drinks  · Do not shop for groceries when you are hungry  You may be more likely to make unhealthy food choices  Take a grocery list of healthy foods and shop after you have eaten  · Eat regular meals  Do not skip meals  Skipping meals can lead to overeating later in the day  This can make it harder for you to lose weight  Eat a healthy snack in place of a meal if you do not have time to eat a regular meal  Talk with a dietitian to help you create a meal plan and schedule that is right for you  Other things to consider as you try to lose weight:   · Be aware of situations that may give you the urge to overeat, such as eating while watching television  Find ways to avoid these situations  For example, read a book, go for a walk, or do crafts  · Meet with a weight loss support group or friends who are also trying to lose weight  This may help you stay motivated to continue working on your weight loss goals  © Copyright [employer] 2022 Information is for End User's use only and may not be sold, redistributed or otherwise used for commercial purposes   All illustrations and images included in CareNotes® are the copyrighted property of A D A M , Inc  or [facility] Daleyi Marcel   The above information is an  only  It is not intended as medical advice for individual conditions or treatments  Talk to your doctor, nurse or pharmacist before following any medical regimen to see if it is safe and effective for you  Low Fat Diet   AMBULATORY CARE:   A low-fat diet  is an eating plan that is low in total fat, unhealthy fat, and cholesterol  You may need to follow a low-fat diet if you have trouble digesting or absorbing fat  You may also need to follow this diet if you have high cholesterol  You can also lower your cholesterol by increasing the amount of fiber in your diet  Soluble fiber is a type of fiber that helps to decrease cholesterol levels  Different types of fat in food:   · Limit unhealthy fats  A diet that is high in cholesterol, saturated fat, and trans fat may cause unhealthy cholesterol levels  Unhealthy cholesterol levels increase your risk of heart disease  ? Cholesterol:  Limit intake of cholesterol to less than 200 mg per day  Cholesterol is found in meat, eggs, and dairy  ? Saturated fat:  Limit saturated fat to less than 7% of your total daily calories  Ask your dietitian how many calories you need each day  Saturated fat is found in butter, cheese, ice cream, whole milk, and palm oil  Saturated fat is also found in meat, such as beef, pork, chicken skin, and processed meats  Processed meats include sausage, hot dogs, and bologna  ? Trans fat:  Avoid trans fat as much as possible  Trans fat is used in fried and baked foods  Foods that say trans fat free on the label may still have up to 0 5 grams of trans fat per serving  · Include healthy fats  Replace foods that are high in saturated and trans fat with foods high in healthy fats  This may help to decrease high cholesterol levels  ? Monounsaturated fats:   These are found in avocados, nuts, and vegetable oils, such as olive, canola, and sunflower oil  ? Polyunsaturated fats: These can be found in vegetable oils, such as soybean or corn oil  Omega-3 fats can help to decrease the risk of heart disease  Omega-3 fats are found in fish, such as salmon, herring, trout, and tuna  Omega-3 fats can also be found in plant foods, such as walnuts, flaxseed, soybeans, and canola oil  Foods to limit or avoid:   · Grains:      ? Snacks that are made with partially hydrogenated oils, such as chips, regular crackers, and butter-flavored popcorn    ? High-fat baked goods, such as biscuits, croissants, doughnuts, pies, cookies, and pastries    · Dairy:      ? Whole milk, 2% milk, and yogurt and ice cream made with whole milk    ? Half and half creamer, heavy cream, and whipping cream    ? Cheese, cream cheese, and sour cream    · Meats and proteins:      ? High-fat cuts of meat (T-bone steak, regular hamburger, and ribs)    ? Fried meat, poultry (turkey and chicken), and fish    ? Poultry (chicken and turkey) with skin    ? Cold cuts (salami or bologna), hot dogs, story, and sausage    ? Whole eggs and egg yolks    · Vegetables and fruits with added fat:      ? Fried vegetables or vegetables in butter or high-fat sauces, such as cream or cheese sauces    ? Fried fruit or fruit served with butter or cream    · Fats:      ? Butter, stick margarine, and shortening    ? Coconut, palm oil, and palm kernel oil    Foods to include:   · Grains:      ? Whole-grain breads, cereals, pasta, and brown rice    ? Low-fat crackers and pretzels    · Vegetables and fruits:      ? Fresh, frozen, or canned vegetables (no salt or low-sodium)    ? Fresh, frozen, dried, or canned fruit (canned in light syrup or fruit juice)    ? Avocado    · Low-fat dairy products:      ? Nonfat (skim) or 1% milk    ? Nonfat or low-fat cheese, yogurt, and cottage cheese    · Meats and proteins:      ? Chicken or turkey with no skin    ? Baked or broiled fish    ?  Lean beef and pork (loin, round, extra lean hamburger)    ? Beans and peas, unsalted nuts, soy products    ? Egg whites and substitutes    ? Seeds and nuts    · Fats:      ? Unsaturated oil, such as canola, olive, peanut, soybean, or sunflower oil    ? Soft or liquid margarine and vegetable oil spread    ? Low-fat salad dressing    Other ways to decrease fat:   · Read food labels before you buy foods  Choose foods that have less than 30% of calories from fat  Choose low-fat or fat-free dairy products  Remember that fat free does not mean calorie free  These foods still contain calories, and too many calories can lead to weight gain  · Trim fat from meat and avoid fried food  Trim all visible fat from meat before you cook it  Remove the skin from poultry  Do not manrique meat, fish, or poultry  Bake, roast, boil, or broil these foods instead  Avoid fried foods  Eat a baked potato instead of [place] fries  Steam vegetables instead of sautéing them in butter  · Add less fat to foods  Use imitation story bits on salads and baked potatoes instead of regular story bits  Use fat-free or low-fat salad dressings instead of regular dressings  Use low-fat or nonfat butter-flavored topping instead of regular butter or margarine on popcorn and other foods  Ways to decrease fat in recipes:  Replace high-fat ingredients with low-fat or nonfat ones  This may cause baked goods to be drier than usual  You may need to use nonfat cooking spray on pans to prevent food from sticking  You also may need to change the amount of other ingredients, such as water, in the recipe  Try the following:  · Use low-fat or light margarine instead of regular margarine or shortening  · Use lean ground turkey breast or chicken, or lean ground beef (less than 5% fat) instead of hamburger  · Add 1 teaspoon of canola oil to 8 ounces of skim milk instead of using cream or half and half  · Use grated zucchini, carrots, or apples in breads instead of coconut      · Use blenderized, low-fat cottage cheese, plain tofu, or low-fat ricotta cheese instead of cream cheese  · Use 1 egg white and 1 teaspoon of canola oil, or use ¼ cup (2 ounces) of fat-free egg substitute instead of a whole egg  · Replace half of the oil that is called for in a recipe with applesauce when you bake  Use 3 tablespoons of cocoa powder and 1 tablespoon of canola oil instead of a square of baking chocolate  How to increase fiber:  Eat enough high-fiber foods to get 20 to 30 grams of fiber every day  Slowly increase your fiber intake to avoid stomach cramps, gas, and other problems  · Eat 3 ounces of whole-grain foods each day  An ounce is about 1 slice of bread  Eat whole-grain breads, such as whole-wheat bread  Whole wheat, whole-wheat flour, or other whole grains should be listed as the first ingredient on the food label  Replace white flour with whole-grain flour or use half of each in recipes  Whole-grain flour is heavier than white flour, so you may have to add more yeast or baking powder  · Eat a high-fiber cereal for breakfast   Oatmeal is a good source of soluble fiber  Look for cereals that have bran or fiber in the name  Choose whole-grain products, such as brown rice, barley, and whole-wheat pasta  · Eat more beans, peas, and lentils  For example, add beans to soups or salads  Eat at least 5 cups of fruits and vegetables each day  Eat fruits and vegetables with the peel because the peel is high in fiber  © Copyright [employer] 2022 Information is for End User's use only and may not be sold, redistributed or otherwise used for commercial purposes  All illustrations and images included in CareNotes® are the copyrighted property of [employer]  or [facility] Alphonso Ellis  The above information is an  only  It is not intended as medical advice for individual conditions or treatments   Talk to your doctor, nurse or pharmacist before following any medical regimen to see if it is safe and effective for you  Heart Healthy Diet   AMBULATORY CARE:   A heart healthy diet  is an eating plan low in unhealthy fats and sodium (salt)  The plan is high in healthy fats and fiber  A heart healthy diet helps improve your cholesterol levels and lowers your risk for heart disease and stroke  A dietitian will teach you how to read and understand food labels  Heart healthy diet guidelines to follow:   · Choose foods that contain healthy fats  ? Unsaturated fats  include monounsaturated and polyunsaturated fats  Unsaturated fat is found in foods such as soybean, canola, olive, corn, and safflower oils  It is also found in soft tub margarine that is made with liquid vegetable oil  ? Omega-3 fat  is found in certain fish, such as salmon, tuna, and trout, and in walnuts and flaxseed  Eat fish high in omega-3 fats at least 2 times a week  · Get 20 to 30 grams of fiber each day  Fruits, vegetables, whole-grain foods, and legumes (cooked beans) are good sources of fiber  · Limit or do not have unhealthy fats  ? Cholesterol  is found in animal foods, such as eggs and lobster, and in dairy products made from whole milk  Limit cholesterol to less than 200 mg each day  ? Saturated fat  is found in meats, such as story and hamburger  It is also found in chicken or turkey skin, whole milk, and butter  Limit saturated fat to less than 7% of your total daily calories  ? Trans fat  is found in packaged foods, such as potato chips and cookies  It is also in hard margarine, some fried foods, and shortening  Do not eat foods that contain trans fats  · Limit sodium as directed  You may be told to limit sodium to 2,000 to 2,300 mg each day  Choose low-sodium or no-salt-added foods  Add little or no salt to food you prepare  Use herbs and spices in place of salt         Include the following in your heart healthy plan:  Ask your dietitian or healthcare provider how many servings to have from each of the following food groups:  · Grains:      ? Whole-wheat breads, cereals, and pastas, and brown rice    ? Low-fat, low-sodium crackers and chips    · Vegetables:      ? Broccoli, green beans, green peas, and spinach    ? Collards, kale, and lima beans    ? Carrots, sweet potatoes, tomatoes, and peppers    ? Canned vegetables with no salt added    · Fruits:      ? Bananas, peaches, pears, and pineapple    ? Grapes, raisins, and dates    ? Oranges, tangerines, grapefruit, orange juice, and grapefruit juice    ? Apricots, mangoes, melons, and papaya    ? Raspberries and strawberries    ? Canned fruit with no added sugar    · Low-fat dairy:      ? Nonfat (skim) milk, 1% milk, and low-fat almond, cashew, or soy milks fortified with calcium    ? Low-fat cheese, regular or frozen yogurt, and cottage cheese    · Meats and proteins:      ? Lean cuts of beef and pork (loin, leg, round), skinless chicken and turkey    ? Legumes, soy products, egg whites, or nuts    Limit or do not include the following in your heart healthy plan:   · Unhealthy fats and oils:      ? Whole or 2% milk, cream cheese, sour cream, or cheese    ? High-fat cuts of beef (T-bone steaks, ribs), chicken or turkey with skin, and organ meats such as liver    ? Butter, stick margarine, shortening, and cooking oils such as coconut or palm oil    · Foods and liquids high in sodium:      ? Packaged foods, such as frozen dinners, cookies, macaroni and cheese, and cereals with more than 300 mg of sodium per serving    ? Vegetables with added sodium, such as instant potatoes, vegetables with added sauces, or regular canned vegetables    ? Cured or smoked meats, such as hot dogs, story, and sausage    ? High-sodium ketchup, barbecue sauce, salad dressing, pickles, olives, soy sauce, or miso    · Foods and liquids high in sugar:      ? Candy, cake, cookies, pies, or doughnuts    ?  Soft drinks (soda), sports drinks, or sweetened tea    ? Canned or dry mixes for cakes, soups, sauces, or gravies    Other healthy heart guidelines:   · Do not smoke  Nicotine and other chemicals in cigarettes and cigars can cause lung and heart damage  Ask your healthcare provider for information if you currently smoke and need help to quit  E-cigarettes or smokeless tobacco still contain nicotine  Talk to your healthcare provider before you use these products  · Limit or do not drink alcohol as directed  Alcohol can damage your heart and raise your blood pressure  Your healthcare provider may give you specific daily and weekly limits  The general recommended limit is 1 drink a day for women 21 or older and for men 72 or older  Do not have more than 3 drinks in a day or 7 in a week  The recommended limit is 2 drinks a day for men 24to 59years of age  Do not have more than 4 drinks in a day or 14 in a week  A drink of alcohol is 12 ounces of beer, 5 ounces of wine, or 1½ ounces of liquor  · Exercise regularly  Exercise can help you maintain a healthy weight and improve your blood pressure and cholesterol levels  Regular exercise can also decrease your risk for heart problems  Ask your healthcare provider about the best exercise plan for you  Do not start an exercise program without asking your healthcare provider  Follow up with your doctor or cardiologist as directed:  Write down your questions so you remember to ask them during your visits  © [employer] 2022 Information is for End User's use only and may not be sold, redistributed or otherwise used for commercial purposes  All illustrations and images included in CareNotes® are the copyrighted property of A [employer] A [employer] , [employer]  or [address] [place], [place] [address]   The above information is an  only  It is not intended as medical advice for individual conditions or treatments   Talk to your doctor, nurse or pharmacist before following any medical regimen to see if it is safe and effective for you

## 2022-06-20 ENCOUNTER — OFFICE VISIT (OUTPATIENT)
Dept: FAMILY MEDICINE CLINIC | Facility: CLINIC | Age: 62
End: 2022-06-20
Payer: COMMERCIAL

## 2022-06-20 VITALS
HEIGHT: 71 IN | SYSTOLIC BLOOD PRESSURE: 150 MMHG | DIASTOLIC BLOOD PRESSURE: 90 MMHG | TEMPERATURE: 96.9 F | HEART RATE: 83 BPM | OXYGEN SATURATION: 98 % | BODY MASS INDEX: 40.6 KG/M2 | WEIGHT: 290 LBS | RESPIRATION RATE: 18 BRPM

## 2022-06-20 DIAGNOSIS — I10 PRIMARY HYPERTENSION: ICD-10-CM

## 2022-06-20 DIAGNOSIS — Z23 ENCOUNTER FOR IMMUNIZATION: Primary | ICD-10-CM

## 2022-06-20 DIAGNOSIS — N52.9 ERECTILE DYSFUNCTION, UNSPECIFIED ERECTILE DYSFUNCTION TYPE: ICD-10-CM

## 2022-06-20 PROCEDURE — 3008F BODY MASS INDEX DOCD: CPT | Performed by: FAMILY MEDICINE

## 2022-06-20 PROCEDURE — 99213 OFFICE O/P EST LOW 20 MIN: CPT | Performed by: FAMILY MEDICINE

## 2022-06-20 PROCEDURE — 3077F SYST BP >= 140 MM HG: CPT | Performed by: FAMILY MEDICINE

## 2022-06-20 PROCEDURE — 90471 IMMUNIZATION ADMIN: CPT | Performed by: FAMILY MEDICINE

## 2022-06-20 PROCEDURE — 90677 PCV20 VACCINE IM: CPT | Performed by: FAMILY MEDICINE

## 2022-06-20 PROCEDURE — 3080F DIAST BP >= 90 MM HG: CPT | Performed by: FAMILY MEDICINE

## 2022-06-20 RX ORDER — CHLORTHALIDONE 25 MG/1
25 TABLET ORAL DAILY
Qty: 30 TABLET | Refills: 5 | Status: SHIPPED | OUTPATIENT
Start: 2022-06-20

## 2022-06-20 NOTE — PATIENT INSTRUCTIONS
Weight Loss  DASH diet  Exercise 30min 5 days a week  Decrease or no alcohol  Limit Salt intake, watch salt from processed foods

## 2022-06-20 NOTE — PROGRESS NOTES
Assessment/Plan:     Diagnoses and all orders for this visit:    Encounter for immunization  -     Pneumococcal Conjugate Vaccine 20-valent (PCV20)    Primary hypertension  -     chlorthalidone 25 mg tablet; Take 1 tablet (25 mg total) by mouth daily    Erectile dysfunction, unspecified erectile dysfunction type  -     Testosterone, free, total; Future  -     TSH, 3rd generation with Free T4 reflex; Future  -     Testosterone, free, total  -     TSH, 3rd generation with Free T4 reflex      65 yo male with Primary HTN and BP today 150/90   -Continue Amlodipine 10mg QD  Started on Clorthalidone 25mg QD as well  Will follow up in 2 weeks for Recheck BP  -Encouraged Lifestyle modifications  1  Weight Loss  2  DASH diet  3  Exercise 30min 5 days a week  4  Decrease or no alcohol  5  Limit Salt intake, watch salt from processed foods  -Educated on how to measure BP after resting for 10 mins with legs not crossed and arm at heart level  Subjective:      Patient ID: Quinton Sosa is a 64 y o  male  HPI  Patient is a 65 yo male who has recently been restarted on Antihypertensive medications after being off for several years due to insurance issues  He was started on Amlodipine 10mg QD with BP at time of 164/94  BP today after resting for 10 mins was 150/90  Denies any headache, chest pain, shortness of breath, abdominal pain, light headedness, vision changes  The following portions of the patient's history were reviewed and updated as appropriate: allergies, current medications, past family history, past medical history, past social history and problem list     Review of Systems    Per HPI    Objective:      Pulse 83   Temp (!) 96 9 °F (36 1 °C) (Tympanic)   Resp 18   Ht 5' 11" (1 803 m)   Wt 132 kg (290 lb)   SpO2 98%   BMI 40 45 kg/m²          Physical Exam  Constitutional:       Appearance: Normal appearance  He is obese  Cardiovascular:      Rate and Rhythm: Normal rate and regular rhythm  Pulses: Normal pulses  Heart sounds: Normal heart sounds  Pulmonary:      Effort: Pulmonary effort is normal       Breath sounds: Normal breath sounds  Musculoskeletal:      Comments: Very mild non pitting bilateral lower extremity edema   Skin:     General: Skin is warm  Capillary Refill: Capillary refill takes less than 2 seconds  Neurological:      General: No focal deficit present  Mental Status: He is alert and oriented to person, place, and time  Psychiatric:         Mood and Affect: Mood normal          Behavior: Behavior normal          Thought Content:  Thought content normal          Judgment: Judgment normal

## 2022-06-21 LAB
ALBUMIN SERPL-MCNC: 4.2 G/DL (ref 3.8–4.8)
ALBUMIN/GLOB SERPL: 1.6 {RATIO} (ref 1.2–2.2)
ALP SERPL-CCNC: 81 IU/L (ref 44–121)
ALT SERPL-CCNC: 24 IU/L (ref 0–44)
AST SERPL-CCNC: 17 IU/L (ref 0–40)
BASOPHILS # BLD AUTO: 0.1 X10E3/UL (ref 0–0.2)
BASOPHILS NFR BLD AUTO: 1 %
BILIRUB SERPL-MCNC: 0.6 MG/DL (ref 0–1.2)
BUN SERPL-MCNC: 12 MG/DL (ref 8–27)
BUN/CREAT SERPL: 14 (ref 10–24)
CALCIUM SERPL-MCNC: 9.4 MG/DL (ref 8.6–10.2)
CHLORIDE SERPL-SCNC: 105 MMOL/L (ref 96–106)
CHOLEST SERPL-MCNC: 176 MG/DL (ref 100–199)
CO2 SERPL-SCNC: 25 MMOL/L (ref 20–29)
CREAT SERPL-MCNC: 0.83 MG/DL (ref 0.76–1.27)
EGFR: 100 ML/MIN/1.73
EOSINOPHIL # BLD AUTO: 0.5 X10E3/UL (ref 0–0.4)
EOSINOPHIL NFR BLD AUTO: 4 %
ERYTHROCYTE [DISTWIDTH] IN BLOOD BY AUTOMATED COUNT: 13.6 % (ref 11.6–15.4)
EST. AVERAGE GLUCOSE BLD GHB EST-MCNC: 108 MG/DL
GLOBULIN SER-MCNC: 2.7 G/DL (ref 1.5–4.5)
GLUCOSE SERPL-MCNC: 88 MG/DL (ref 65–99)
HBA1C MFR BLD: 5.4 % (ref 4.8–5.6)
HCT VFR BLD AUTO: 50.5 % (ref 37.5–51)
HCV AB S/CO SERPL IA: 0.1 S/CO RATIO (ref 0–0.9)
HDLC SERPL-MCNC: 27 MG/DL
HGB BLD-MCNC: 17.6 G/DL (ref 13–17.7)
IMM GRANULOCYTES # BLD: 0.2 X10E3/UL (ref 0–0.1)
IMM GRANULOCYTES NFR BLD: 2 %
LDLC SERPL CALC-MCNC: 116 MG/DL (ref 0–99)
LDLC/HDLC SERPL: 4.3 RATIO (ref 0–3.6)
LYMPHOCYTES # BLD AUTO: 3.8 X10E3/UL (ref 0.7–3.1)
LYMPHOCYTES NFR BLD AUTO: 30 %
MCH RBC QN AUTO: 31.1 PG (ref 26.6–33)
MCHC RBC AUTO-ENTMCNC: 34.9 G/DL (ref 31.5–35.7)
MCV RBC AUTO: 89 FL (ref 79–97)
MONOCYTES # BLD AUTO: 1.2 X10E3/UL (ref 0.1–0.9)
MONOCYTES NFR BLD AUTO: 9 %
NEUTROPHILS # BLD AUTO: 7.2 X10E3/UL (ref 1.4–7)
NEUTROPHILS NFR BLD AUTO: 54 %
PLATELET # BLD AUTO: 284 X10E3/UL (ref 150–450)
POTASSIUM SERPL-SCNC: 4.5 MMOL/L (ref 3.5–5.2)
PROT SERPL-MCNC: 6.9 G/DL (ref 6–8.5)
RBC # BLD AUTO: 5.66 X10E6/UL (ref 4.14–5.8)
SL AMB VLDL CHOLESTEROL CALC: 33 MG/DL (ref 5–40)
SODIUM SERPL-SCNC: 142 MMOL/L (ref 134–144)
TRIGL SERPL-MCNC: 188 MG/DL (ref 0–149)
WBC # BLD AUTO: 13 X10E3/UL (ref 3.4–10.8)

## 2022-07-22 NOTE — PROGRESS NOTES
Assessment/Plan:      Diagnoses and all orders for this visit:    Benign essential hypertension    BP check    Erectile dysfunction, unspecified erectile dysfunction type  -     tadalafil (CIALIS) 10 MG tablet; Take 1 tablet (10 mg total) by mouth daily as needed for erectile dysfunction          Blood pressure in office today 135/88  Patient can continue his current regimen and continue to monitor his blood pressure  Provided patient with handout on dash diet  Provided patient a prescription for Cialis as needed  Advised patient to continue to make dietary and exercise changes to better control his ED  Follow-up in 1 month for yearly physical     Subjective:     Patient ID: Johnathon Barba is a 58 y o  male  HPI   Patient here today for BP follow-up  Patient states his blood pressure has been 130s over 80s at home  In office today, blood pressure 135/88  Patient is continuing on amlodipine 10 mg daily and chlorthalidone 25 mg daily  Patient has no side effects or issues with this medication regiment  Patient requesting medicine for erectile dysfunction  Patient states he has continued to have issues despite improving his blood pressure and losing some weight  Review of Systems   Constitutional: Negative for chills and fever  HENT: Negative for sore throat  Respiratory: Negative for cough and shortness of breath  Cardiovascular: Negative for chest pain and palpitations  Gastrointestinal: Negative for abdominal pain, constipation, diarrhea, nausea and vomiting  Genitourinary: Negative for difficulty urinating and dysuria  Neurological: Negative for dizziness and headaches  Objective:  Vitals:    07/25/22 0943   BP: 135/88   Pulse: 97   Resp: 16   Temp: 98 6 °F (37 °C)   SpO2: 97%        Physical Exam  Constitutional:       Appearance: Normal appearance  He is obese  HENT:      Head: Normocephalic and atraumatic     Cardiovascular:      Rate and Rhythm: Normal rate and regular rhythm  Heart sounds: Normal heart sounds  No murmur heard  Pulmonary:      Breath sounds: Normal breath sounds  No wheezing  Abdominal:      Palpations: Abdomen is soft  Tenderness: There is no abdominal tenderness  Musculoskeletal:      Right lower leg: No edema  Left lower leg: No edema  Neurological:      Mental Status: He is alert

## 2022-07-25 ENCOUNTER — OFFICE VISIT (OUTPATIENT)
Dept: FAMILY MEDICINE CLINIC | Facility: CLINIC | Age: 62
End: 2022-07-25
Payer: COMMERCIAL

## 2022-07-25 VITALS
SYSTOLIC BLOOD PRESSURE: 135 MMHG | TEMPERATURE: 98.6 F | BODY MASS INDEX: 39.47 KG/M2 | WEIGHT: 283 LBS | RESPIRATION RATE: 16 BRPM | HEART RATE: 97 BPM | OXYGEN SATURATION: 97 % | DIASTOLIC BLOOD PRESSURE: 88 MMHG

## 2022-07-25 DIAGNOSIS — N52.9 ERECTILE DYSFUNCTION, UNSPECIFIED ERECTILE DYSFUNCTION TYPE: ICD-10-CM

## 2022-07-25 DIAGNOSIS — I10 BENIGN ESSENTIAL HYPERTENSION: Primary | ICD-10-CM

## 2022-07-25 DIAGNOSIS — Z01.30 BP CHECK: ICD-10-CM

## 2022-07-25 PROCEDURE — 3079F DIAST BP 80-89 MM HG: CPT | Performed by: FAMILY MEDICINE

## 2022-07-25 PROCEDURE — 3075F SYST BP GE 130 - 139MM HG: CPT | Performed by: FAMILY MEDICINE

## 2022-07-25 PROCEDURE — 99213 OFFICE O/P EST LOW 20 MIN: CPT | Performed by: FAMILY MEDICINE

## 2022-07-25 RX ORDER — TADALAFIL 10 MG/1
10 TABLET ORAL DAILY PRN
Qty: 10 TABLET | Refills: 0 | Status: SHIPPED | OUTPATIENT
Start: 2022-07-25

## 2022-08-08 DIAGNOSIS — Z96.651 S/P TOTAL KNEE REPLACEMENT USING CEMENT, RIGHT: ICD-10-CM

## 2022-08-08 DIAGNOSIS — I10 BENIGN ESSENTIAL HYPERTENSION: ICD-10-CM

## 2022-08-08 RX ORDER — AMLODIPINE BESYLATE 10 MG/1
10 TABLET ORAL DAILY
Qty: 90 TABLET | Refills: 1 | Status: SHIPPED | OUTPATIENT
Start: 2022-08-08

## 2022-10-12 PROBLEM — R05.9 COUGH: Status: RESOLVED | Noted: 2021-05-04 | Resolved: 2022-10-12

## 2023-04-24 ENCOUNTER — OFFICE VISIT (OUTPATIENT)
Dept: FAMILY MEDICINE CLINIC | Facility: CLINIC | Age: 63
End: 2023-04-24

## 2023-04-24 VITALS
HEIGHT: 71 IN | OXYGEN SATURATION: 97 % | RESPIRATION RATE: 16 BRPM | WEIGHT: 284 LBS | HEART RATE: 102 BPM | BODY MASS INDEX: 39.76 KG/M2 | DIASTOLIC BLOOD PRESSURE: 88 MMHG | SYSTOLIC BLOOD PRESSURE: 140 MMHG

## 2023-04-24 DIAGNOSIS — I10 BENIGN ESSENTIAL HYPERTENSION: Primary | ICD-10-CM

## 2023-04-24 DIAGNOSIS — Z71.6 ENCOUNTER FOR TOBACCO USE CESSATION COUNSELING: ICD-10-CM

## 2023-04-24 DIAGNOSIS — N52.9 ERECTILE DYSFUNCTION, UNSPECIFIED ERECTILE DYSFUNCTION TYPE: ICD-10-CM

## 2023-04-24 DIAGNOSIS — E66.9 OBESITY (BMI 30-39.9): ICD-10-CM

## 2023-04-24 RX ORDER — TADALAFIL 10 MG/1
10 TABLET ORAL DAILY PRN
Qty: 10 TABLET | Refills: 0 | Status: SHIPPED | OUTPATIENT
Start: 2023-04-24

## 2023-04-24 RX ORDER — CHLORTHALIDONE 25 MG/1
25 TABLET ORAL DAILY
Qty: 30 TABLET | Refills: 5 | Status: SHIPPED | OUTPATIENT
Start: 2023-04-24

## 2023-04-24 RX ORDER — AMLODIPINE BESYLATE 10 MG/1
10 TABLET ORAL DAILY
Qty: 90 TABLET | Refills: 1 | Status: SHIPPED | OUTPATIENT
Start: 2023-04-24

## 2023-04-24 RX ORDER — NICOTINE 21 MG/24HR
1 PATCH, TRANSDERMAL 24 HOURS TRANSDERMAL EVERY 24 HOURS
Qty: 28 PATCH | Refills: 1 | Status: SHIPPED | OUTPATIENT
Start: 2023-04-24

## 2023-04-24 NOTE — PROGRESS NOTES
[facility] Office Visit    Assessment/Plan:     1  Benign essential hypertension  Assessment & Plan:  /90 in office, recheck 140/88  Has not been taking his bp meds for past 1 month as he has been out  Normally compliant with medications  · Refill amlodipine and chlorthalidone today  · Follow up in 8 weeks for annual physical and blood pressure check   · Check your bp at home if able   · Continue with diet and lifestyle modifications as weight loss can help improve blood pressure     Orders:  -     amLODIPine (NORVASC) 10 mg tablet; Take 1 tablet (10 mg total) by mouth daily  -     chlorthalidone 25 mg tablet; Take 1 tablet (25 mg total) by mouth daily  -     CBC and differential; Future  -     Comprehensive metabolic panel; Future  -     CBC and differential  -     Comprehensive metabolic panel    2  Encounter for tobacco use cessation counseling  Assessment & Plan:  Patient would like to quit tobacco use  Used to smoke 1 pack per day  Has stopped smoking cigarettes, but now uses a vape/e-cigarette and would like to eventually quit that as well  Previously prescribed nicotine gum but does not use it due to teeth issues  Tobacco Cessation Counseling: Tobacco cessation counseling and education was provided  The patient is sincerely urged to quit consumption of tobacco  He is ready to quit tobacco  The numerous health risks of tobacco consumption were discussed  Prescribed the following medications: nicotine patch    I have spent 10 minutes with Patient  today in which greater than 50% of this time was spent in counseling/coordination of care regarding tobacco cessation  Orders:  -     nicotine (NICODERM CQ) 14 mg/24hr TD 24 hr patch; Place 1 patch on the skin over 24 hours every 24 hours    3  Erectile dysfunction, unspecified erectile dysfunction type  -     tadalafil (CIALIS) 10 MG tablet;  Take 1 tablet (10 mg total) by mouth daily as needed for erectile dysfunction  - "Testosterone, free, total; Future  -     Testosterone, free, total    4  Obesity (BMI 30-39 9)  -     Lipid Panel with Direct LDL reflex; Future  -     TSH, 3rd generation with Free T4 reflex; Future  -     Lipid Panel with Direct LDL reflex  -     TSH, 3rd generation with Free T4 reflex        Return in about 10 weeks (around 7/3/2023) for Annual Physical, w/[facility] ONLY  Subjective:   JARROD Byers is a 58 y o  male who is here today to follow up on his blood pressure  He has been changing his diet, lost 10-12 lbs over the past year, has been exercising about 3 times per week, also works as a  and does heavy lifting at work  He also needs refills on his bp medication  He has been out of his bp medications for about 1 month  Patient states he has stopped smoking cigarettes, however he has been vaping using an e-cigarette  He states he is trying to cut down and this is the first step for him  He also uses nitocine gum but has not been chewing it because of his teeth  Patient would like to try the patch today  Review of Systems   Constitutional: Positive for activity change (more energy)  Negative for appetite change, chills, diaphoresis, fatigue and fever  HENT: Negative for congestion, sinus pressure, sinus pain and sore throat  Eyes: Negative for pain, redness and itching  Respiratory: Negative for cough, chest tightness and shortness of breath  Cardiovascular: Negative for chest pain and palpitations  Gastrointestinal: Negative for abdominal pain, constipation, diarrhea, nausea and vomiting  Genitourinary: Negative for difficulty urinating, dysuria and flank pain  Musculoskeletal: Negative for arthralgias, back pain and myalgias  Skin: Negative for color change, pallor and rash  Neurological: Negative for dizziness, light-headedness and headaches  All other systems reviewed and are negative         Objective:     /88   Pulse 102   Resp 16   Ht 5' 11\" " (1 803 m)   Wt 129 kg (284 lb)   SpO2 97%   BMI 39 61 kg/m²      Physical Exam  Constitutional:       General: He is not in acute distress  Appearance: He is obese  HENT:      Head: Normocephalic and atraumatic  Eyes:      General: No scleral icterus  Extraocular Movements: Extraocular movements intact  Conjunctiva/sclera: Conjunctivae normal    Cardiovascular:      Rate and Rhythm: Normal rate and regular rhythm  Heart sounds: Normal heart sounds  No murmur heard  Pulmonary:      Effort: Pulmonary effort is normal  No respiratory distress  Breath sounds: Normal breath sounds  Abdominal:      General: Bowel sounds are normal       Palpations: Abdomen is soft  Tenderness: There is no abdominal tenderness  Musculoskeletal:      Cervical back: Normal range of motion  Right lower leg: No edema  Left lower leg: No edema  Skin:     General: Skin is warm  Neurological:      Mental Status: He is alert and oriented to person, place, and time     Psychiatric:         Mood and Affect: Mood normal          Behavior: Behavior normal           ** Please Note: This note has been constructed using a voice recognition system Danielle Warren DO  04/24/23  2:43 PM

## 2023-04-24 NOTE — ASSESSMENT & PLAN NOTE
/90 in office, recheck 140/88  Has not been taking his bp meds for past 1 month as he has been out  Normally compliant with medications        · Refill amlodipine and chlorthalidone today  · Follow up in 8 weeks for annual physical and blood pressure check   · Check your bp at home if able   · Continue with diet and lifestyle modifications as weight loss can help improve blood pressure

## 2023-04-24 NOTE — ASSESSMENT & PLAN NOTE
Patient would like to quit tobacco use  Used to smoke 1 pack per day  Has stopped smoking cigarettes, but now uses a vape/e-cigarette and would like to eventually quit that as well  Previously prescribed nicotine gum but does not use it due to teeth issues  Tobacco Cessation Counseling: Tobacco cessation counseling and education was provided  The patient is sincerely urged to quit consumption of tobacco  He is ready to quit tobacco  The numerous health risks of tobacco consumption were discussed  Prescribed the following medications: nicotine patch    I have spent 10 minutes with Patient  today in which greater than 50% of this time was spent in counseling/coordination of care regarding tobacco cessation

## 2023-08-24 DIAGNOSIS — I10 BENIGN ESSENTIAL HYPERTENSION: ICD-10-CM

## 2023-08-24 RX ORDER — AMLODIPINE BESYLATE 10 MG/1
10 TABLET ORAL DAILY
Qty: 90 TABLET | Refills: 1 | Status: SHIPPED | OUTPATIENT
Start: 2023-08-24

## 2023-10-17 ENCOUNTER — APPOINTMENT (OUTPATIENT)
Dept: LAB | Facility: HOSPITAL | Age: 63
End: 2023-10-17
Payer: COMMERCIAL

## 2023-10-17 DIAGNOSIS — E66.9 OBESITY, UNSPECIFIED CLASSIFICATION, UNSPECIFIED OBESITY TYPE, UNSPECIFIED WHETHER SERIOUS COMORBIDITY PRESENT: ICD-10-CM

## 2023-10-17 DIAGNOSIS — I10 ESSENTIAL HYPERTENSION, MALIGNANT: ICD-10-CM

## 2023-10-17 DIAGNOSIS — N52.9 IMPOTENCE OF ORGANIC ORIGIN: ICD-10-CM

## 2023-10-17 LAB
ALBUMIN SERPL BCP-MCNC: 4.2 G/DL (ref 3.5–5)
ALP SERPL-CCNC: 61 U/L (ref 34–104)
ALT SERPL W P-5'-P-CCNC: 28 U/L (ref 7–52)
ANION GAP SERPL CALCULATED.3IONS-SCNC: 7 MMOL/L
AST SERPL W P-5'-P-CCNC: 17 U/L (ref 13–39)
BASOPHILS # BLD AUTO: 0.07 THOUSANDS/ÂΜL (ref 0–0.1)
BASOPHILS NFR BLD AUTO: 1 % (ref 0–1)
BILIRUB SERPL-MCNC: 0.9 MG/DL (ref 0.2–1)
BUN SERPL-MCNC: 20 MG/DL (ref 5–25)
CALCIUM SERPL-MCNC: 9.2 MG/DL (ref 8.4–10.2)
CHLORIDE SERPL-SCNC: 109 MMOL/L (ref 96–108)
CHOLEST SERPL-MCNC: 156 MG/DL
CO2 SERPL-SCNC: 26 MMOL/L (ref 21–32)
CREAT SERPL-MCNC: 0.88 MG/DL (ref 0.6–1.3)
EOSINOPHIL # BLD AUTO: 0.17 THOUSAND/ÂΜL (ref 0–0.61)
EOSINOPHIL NFR BLD AUTO: 2 % (ref 0–6)
ERYTHROCYTE [DISTWIDTH] IN BLOOD BY AUTOMATED COUNT: 13.1 % (ref 11.6–15.1)
GFR SERPL CREATININE-BSD FRML MDRD: 91 ML/MIN/1.73SQ M
GLUCOSE P FAST SERPL-MCNC: 96 MG/DL (ref 65–99)
HCT VFR BLD AUTO: 47.1 % (ref 36.5–49.3)
HDLC SERPL-MCNC: 30 MG/DL
HGB BLD-MCNC: 16.3 G/DL (ref 12–17)
IMM GRANULOCYTES # BLD AUTO: 0.09 THOUSAND/UL (ref 0–0.2)
IMM GRANULOCYTES NFR BLD AUTO: 1 % (ref 0–2)
LDLC SERPL CALC-MCNC: 95 MG/DL (ref 0–100)
LYMPHOCYTES # BLD AUTO: 2.49 THOUSANDS/ÂΜL (ref 0.6–4.47)
LYMPHOCYTES NFR BLD AUTO: 29 % (ref 14–44)
MCH RBC QN AUTO: 30.3 PG (ref 26.8–34.3)
MCHC RBC AUTO-ENTMCNC: 34.6 G/DL (ref 31.4–37.4)
MCV RBC AUTO: 88 FL (ref 82–98)
MONOCYTES # BLD AUTO: 0.89 THOUSAND/ÂΜL (ref 0.17–1.22)
MONOCYTES NFR BLD AUTO: 10 % (ref 4–12)
NEUTROPHILS # BLD AUTO: 4.93 THOUSANDS/ÂΜL (ref 1.85–7.62)
NEUTS SEG NFR BLD AUTO: 57 % (ref 43–75)
NRBC BLD AUTO-RTO: 0 /100 WBCS
PLATELET # BLD AUTO: 255 THOUSANDS/UL (ref 149–390)
PMV BLD AUTO: 8.9 FL (ref 8.9–12.7)
POTASSIUM SERPL-SCNC: 4.2 MMOL/L (ref 3.5–5.3)
PROT SERPL-MCNC: 7.4 G/DL (ref 6.4–8.4)
RBC # BLD AUTO: 5.38 MILLION/UL (ref 3.88–5.62)
SODIUM SERPL-SCNC: 142 MMOL/L (ref 135–147)
TRIGL SERPL-MCNC: 157 MG/DL
TSH SERPL DL<=0.05 MIU/L-ACNC: 3.51 UIU/ML (ref 0.45–4.5)
WBC # BLD AUTO: 8.64 THOUSAND/UL (ref 4.31–10.16)

## 2023-10-17 PROCEDURE — 80053 COMPREHEN METABOLIC PANEL: CPT

## 2023-10-17 PROCEDURE — 84443 ASSAY THYROID STIM HORMONE: CPT

## 2023-10-17 PROCEDURE — 85025 COMPLETE CBC W/AUTO DIFF WBC: CPT

## 2023-10-17 PROCEDURE — 36415 COLL VENOUS BLD VENIPUNCTURE: CPT

## 2023-10-17 PROCEDURE — 84402 ASSAY OF FREE TESTOSTERONE: CPT

## 2023-10-17 PROCEDURE — 84403 ASSAY OF TOTAL TESTOSTERONE: CPT

## 2023-10-17 PROCEDURE — 80061 LIPID PANEL: CPT

## 2023-10-19 LAB
TESTOST FREE SERPL-MCNC: 5.9 PG/ML (ref 6.6–18.1)
TESTOST SERPL-MCNC: 190 NG/DL (ref 264–916)

## 2023-10-24 ENCOUNTER — OFFICE VISIT (OUTPATIENT)
Dept: FAMILY MEDICINE CLINIC | Facility: CLINIC | Age: 63
End: 2023-10-24
Payer: COMMERCIAL

## 2023-10-24 VITALS
BODY MASS INDEX: 42.13 KG/M2 | HEART RATE: 68 BPM | RESPIRATION RATE: 18 BRPM | TEMPERATURE: 98.2 F | HEIGHT: 71 IN | DIASTOLIC BLOOD PRESSURE: 82 MMHG | WEIGHT: 300.9 LBS | SYSTOLIC BLOOD PRESSURE: 155 MMHG

## 2023-10-24 DIAGNOSIS — R79.89 LOW TESTOSTERONE LEVEL IN MALE: Primary | ICD-10-CM

## 2023-10-24 DIAGNOSIS — Z23 ENCOUNTER FOR IMMUNIZATION: ICD-10-CM

## 2023-10-24 DIAGNOSIS — N52.9 ERECTILE DYSFUNCTION, UNSPECIFIED ERECTILE DYSFUNCTION TYPE: ICD-10-CM

## 2023-10-24 DIAGNOSIS — Z71.2 ENCOUNTER TO DISCUSS TEST RESULTS: ICD-10-CM

## 2023-10-24 PROCEDURE — 90686 IIV4 VACC NO PRSV 0.5 ML IM: CPT

## 2023-10-24 PROCEDURE — 90471 IMMUNIZATION ADMIN: CPT | Performed by: FAMILY MEDICINE

## 2023-10-24 PROCEDURE — 99213 OFFICE O/P EST LOW 20 MIN: CPT

## 2023-10-24 PROCEDURE — 90686 IIV4 VACC NO PRSV 0.5 ML IM: CPT | Performed by: FAMILY MEDICINE

## 2023-10-24 PROCEDURE — 90471 IMMUNIZATION ADMIN: CPT

## 2023-10-24 PROCEDURE — 3725F SCREEN DEPRESSION PERFORMED: CPT | Performed by: FAMILY MEDICINE

## 2023-10-24 PROCEDURE — 99213 OFFICE O/P EST LOW 20 MIN: CPT | Performed by: FAMILY MEDICINE

## 2023-10-24 NOTE — ASSESSMENT & PLAN NOTE
Testosterone, Free 6.6 - 18.1 pg/mL 5.9 Low    TESTOSTERONE TOTAL 264 - 916 ng/dL 190 Low      Patient has low testosterone levels and history of erectile dysfunction. States sexual activity has decreased but still would like to follow up on testosterone levels.      Referral to endocrinology   Follow up in 6 months as needed

## 2023-10-24 NOTE — PROGRESS NOTES
[facility] Office visit    Assessment/Plan:     1. Low testosterone level in male  Assessment & Plan:  Testosterone, Free 6.6 - 18.1 pg/mL 5.9 Low    TESTOSTERONE TOTAL 264 - 916 ng/dL 190 Low      Patient has low testosterone levels and history of erectile dysfunction. States sexual activity has decreased but still would like to follow up on testosterone levels. Referral to endocrinology   Follow up in 6 months as needed     Orders:  -     Ambulatory Referral to Endocrinology; Future    2. Erectile dysfunction, unspecified erectile dysfunction type  -     Ambulatory Referral to Endocrinology; Future    3. Encounter for immunization  -     influenza vaccine, quadrivalent, 0.5 mL, preservative-free, for adult and pediatric patients 6 mos+ (AFLURIA, FLUARIX, FLULAVAL, FLUZONE)    4. Encounter to discuss test results          No follow-ups on file. Subjective:   JARROD Pardo is a 61 y.o. male who is here today to follow up on his labs and review his medications. 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 He recently changed to second shift and has a more normal daily routine now. Patient states he will start taking the Chlorthalidone. Review of Systems   Constitutional:  Negative for chills and fever. HENT:  Negative for ear pain and sore throat. Eyes:  Negative for pain and visual disturbance. Respiratory:  Negative for cough and shortness of breath. Cardiovascular:  Negative for chest pain and palpitations. Gastrointestinal:  Negative for abdominal pain and vomiting. Genitourinary:  Negative for dysuria and hematuria. Musculoskeletal:  Negative for arthralgias and back pain. Skin:  Negative for color change and rash. Neurological:  Negative for dizziness and headaches. All other systems reviewed and are negative. Objective:     /82   Pulse 68   Temp 98.2 °F (36.8 °C)   Resp 18   Ht 5' 11" (1.803 m)   Wt (!) 136 kg (300 lb 14.4 oz)   BMI 41.97 kg/m²      Physical Exam  Vitals reviewed. Constitutional:       Appearance: He is obese. HENT:      Head: Normocephalic and atraumatic. Mouth/Throat:      Mouth: Mucous membranes are moist.   Eyes:      Extraocular Movements: Extraocular movements intact. Conjunctiva/sclera: Conjunctivae normal.   Cardiovascular:      Rate and Rhythm: Normal rate and regular rhythm. Heart sounds: Normal heart sounds. No murmur heard. Pulmonary:      Effort: Pulmonary effort is normal. No respiratory distress. Breath sounds: Normal breath sounds. Abdominal:      General: Bowel sounds are normal.      Palpations: Abdomen is soft. There is no mass. Tenderness: There is no abdominal tenderness. Musculoskeletal:      Cervical back: Normal range of motion. Right lower leg: No edema. Left lower leg: No edema. Skin:     General: Skin is warm. Neurological:      General: No focal deficit present. Mental Status: He is alert and oriented to person, place, and time.    Psychiatric:         Mood and Affect: Mood normal.         Behavior: Behavior normal.          ** Please Note: This note has been constructed using a voice recognition system Cait Patel DO  10/24/23  11:49 AM

## 2023-10-25 ENCOUNTER — TELEPHONE (OUTPATIENT)
Dept: FAMILY MEDICINE CLINIC | Facility: CLINIC | Age: 63
End: 2023-10-25

## 2023-10-25 ENCOUNTER — TELEPHONE (OUTPATIENT)
Dept: ENDOCRINOLOGY | Facility: CLINIC | Age: 63
End: 2023-10-25

## 2023-10-25 NOTE — TELEPHONE ENCOUNTER
Mark on clinical line:     Vijayandrew El 1960 [phone] about my medication CHLORTHALIDONE 25 milligrams. I'm out. The prescription goes to [facility] for my maintenance. There's says on the bottle I have 5 refills. Can you please forward that information to [facility]? I'd appreciate it. Thank you and have a good day. Clerical- please call to let pt know rx with 5 refills is at [facility].  If [facility] he will need to have rx transferred from [facility] to [address] [place] [address] (pharmacy to pharmacy call- one pharmacy will have to call the other)

## 2024-02-05 DIAGNOSIS — I10 BENIGN ESSENTIAL HYPERTENSION: ICD-10-CM

## 2024-02-05 RX ORDER — AMLODIPINE BESYLATE 10 MG/1
10 TABLET ORAL DAILY
Qty: 90 TABLET | Refills: 1 | Status: SHIPPED | OUTPATIENT
Start: 2024-02-05

## 2024-02-15 ENCOUNTER — OFFICE VISIT (OUTPATIENT)
Dept: URGENT CARE | Facility: CLINIC | Age: 64
End: 2024-02-15
Payer: COMMERCIAL

## 2024-02-15 VITALS
DIASTOLIC BLOOD PRESSURE: 90 MMHG | OXYGEN SATURATION: 97 % | BODY MASS INDEX: 41.84 KG/M2 | HEART RATE: 93 BPM | SYSTOLIC BLOOD PRESSURE: 142 MMHG | WEIGHT: 300 LBS | RESPIRATION RATE: 16 BRPM | TEMPERATURE: 98.2 F

## 2024-02-15 DIAGNOSIS — U07.1 COVID-19: Primary | ICD-10-CM

## 2024-02-15 LAB
SARS-COV-2 AG UPPER RESP QL IA: POSITIVE
VALID CONTROL: ABNORMAL

## 2024-02-15 PROCEDURE — 87811 SARS-COV-2 COVID19 W/OPTIC: CPT | Performed by: PHYSICIAN ASSISTANT

## 2024-02-15 PROCEDURE — 99203 OFFICE O/P NEW LOW 30 MIN: CPT | Performed by: PHYSICIAN ASSISTANT

## 2024-02-15 NOTE — LETTER
February 15, 2024    Patient: Ryan Kulkarni  YOB: 1960  Date of Last Encounter: 2/15/2024      To whom it may concern:     Ryan Kulkarni has tested positive for COVID-19 (Coronavirus). He may return to work on 2/18/2024, which is 5 days from illness onset (provided symptoms are improving) and 24 hours without fever. He must then adhere to strict masking for an additional 5 days.    Sincerely,         Sarah Beth Yin PA-C

## 2024-02-15 NOTE — PROGRESS NOTES
[facility]        NAME: Ryan Kulkarni is a 63 y.o. male  : 1960    MRN: [id number]  DATE: February 15, 2024  TIME: 2:08 PM    Assessment and Plan   COVID-19 [U07.1]  1. COVID-19  Poct Covid 19 Rapid Antigen Test        Discussed risks vs benefits of Paxlovid given his HTN and BMI, patient declines at this time. Work note provided. Encouraged rest and fluid intake. Discussed strict return to care precautions as well as red flag symptoms which should prompt immediate ED referral. Pt verbalized understanding and is in agreement with plan.  Please follow up with your primary care provider within the next week. Please remember that your visit today was with an urgent care provider and should not replace follow up with your primary care provider for chronic medical issues or annual physicals.     (Directly from CDC website)  IF YOU TEST POSITIVE FOR COVID-19:  If you have symptoms, you can end isolation after 5 full days if you are fever-free for 24 hours without the use of fever-reducing medication and your other symptoms have improved. Loss of taste and/or smell may persist for weeks or more and should not delay the end of isolation. Your first day of symptoms is DAY ZERO. You should continue to wear a well-fitting mask (like N95, KN95) both at home and in public for 5 additional days. Avoid people who are at high risk for severe disease for at least 10 days. DO NOT go to places where you are unable to wear a mask until a full 10 days from your first symptom.  If you have no symptoms, quarantine for 5 days from the day you were tested. The day you were tested is DAY ZERO. Continue wearing a mask around others until day 10. If you develop symptoms, your 5-day isolation period starts over.  If you have/had severe symptoms and/or a compromised immune system, you may need to isolate longer. Consult with your primary care provider about when you can resume being around other people.    IF YOU HAVE HAD  CLOSE EXPOSURE:  QUARANTINE IF: You are ages 18 or older and either have not been vaccinated, or have been vaccinated with your primary series but not the booster. You must quarantine for at least 5 days after your last contact. If you develop symptoms, get tested immediately. If you do not develop symptoms, get tested at least 5 days after your last exposure. Avoid people who are immunocompromised at high risk for severe disease until at least after 10 days.  YOU DO NOT HAVE TO QUARANTINE IF:   You are 18 years or older and have received all recommended vaccine doses, including boosters and additional primary shots for some immunocompromised people.  You are ages 5-17 and completed the primary series of COVID-19 vaccines.  You had confirmed COVID-19 within the last 90 days on a viral test.  You should still wear a well-fitting mask around others for 10 days from the date of your last close exposure to COVID-19, and get tested at least 5 days later.  Quarantine and isolation guidelines differ for healthcare professionals.      Patient Instructions       Follow up with PCP in 3-5 days.  Proceed to  ER if symptoms worsen.    Chief Complaint     Chief Complaint   Patient presents with    Cold Like Symptoms     Pt presents with headaches, sore throat, cough, dizzy, fatigue; started on Monday         History of Present Illness       Ryan Kulkarni is a(n) 63 y.o. male presenting with URI symptoms x 3 days  Past medical history: HTN  Congestion: yes  Sore throat: yes  Cough: yes  Sputum production: no  Fever: no  Body aches: no  Loss of smell/taste: no  GI symptoms: no  Known sick contacts: no  OTC meds tried: Aspirin  Also endorses headache   States he is feeling a little better today but needs work note.        Review of Systems   Review of Systems   Constitutional:  Negative for chills and fever.   HENT:  Positive for congestion and sore throat. Negative for rhinorrhea and sinus pain.    Respiratory:  Positive for  cough. Negative for shortness of breath and wheezing.    Cardiovascular:  Negative for chest pain and palpitations.   Gastrointestinal:  Negative for diarrhea, nausea and vomiting.   Musculoskeletal:  Negative for myalgias.   Neurological:  Positive for headaches. Negative for dizziness and weakness.         Current Medications       Current Outpatient Medications:     amLODIPine (NORVASC) 10 mg tablet, Take 1 tablet (10 mg total) by mouth daily, Disp: 90 tablet, Rfl: 1    tadalafil (CIALIS) 10 MG tablet, Take 1 tablet (10 mg total) by mouth daily as needed for erectile dysfunction, Disp: 10 tablet, Rfl: 0    chlorthalidone 25 mg tablet, Take 1 tablet (25 mg total) by mouth daily (Patient not taking: Reported on 10/24/2023), Disp: 30 tablet, Rfl: 5    nicotine (NICODERM CQ) 14 mg/24hr TD 24 hr patch, Place 1 patch on the skin over 24 hours every 24 hours (Patient not taking: Reported on 10/24/2023), Disp: 28 patch, Rfl: 1    nicotine polacrilex (NICORETTE) 2 mg gum, Chew 1 each (2 mg total)  as needed in the morning for smoking cessation. (Patient not taking: Reported on 4/24/2023), Disp: 100 each, Rfl: 0    Current Allergies     Allergies as of 02/15/2024 - Reviewed 02/15/2024   Allergen Reaction Noted    Other Allergic Rhinitis 07/25/2022            The following portions of the patient's history were reviewed and updated as appropriate: allergies, current medications, past family history, past medical history, past social history, past surgical history and problem list.     Past Medical History:   Diagnosis Date    Ankle edema 07/13/2015    Arthritis     Benign essential hypertension     COVID-19     november 2020    Obesity     Organic impotence     Seasonal allergies        Past Surgical History:   Procedure Laterality Date    JOINT REPLACEMENT Right 01/05/2021    right knee    [place] ARTHRP KNE CONDYLE&PLATU MEDIAL&LAT COMPARTMENTS Right 1/5/2021    Procedure: ARTHROPLASTY KNEE TOTAL;  Surgeon: Williams Ortega DO;   Location: [facility];  Service: Orthopedics    TONSILLECTOMY      US GUIDED MSK PROCEDURE  6/11/2020       Family History   Problem Relation Age of Onset    Alzheimer's disease Mother     Cancer Mother          Medications have been verified.        Objective   /90   Pulse 93   Temp 98.2 °F (36.8 °C)   Resp 16   Wt 136 kg (300 lb)   SpO2 97%   BMI 41.84 kg/m²        Physical Exam     Physical Exam  Vitals and nursing note reviewed.   Constitutional:       General: He is not in acute distress.     Appearance: Normal appearance. He is not ill-appearing or toxic-appearing.   HENT:      Head: Normocephalic and atraumatic.      Right Ear: Tympanic membrane, ear canal and external ear normal.      Left Ear: Tympanic membrane, ear canal and external ear normal.      Nose: No congestion.      Mouth/Throat:      Mouth: Mucous membranes are moist.      Pharynx: Oropharynx is clear. No oropharyngeal exudate or posterior oropharyngeal erythema.   Eyes:      Conjunctiva/sclera: Conjunctivae normal.      Pupils: Pupils are equal, round, and reactive to light.   Cardiovascular:      Rate and Rhythm: Normal rate and regular rhythm.      Heart sounds: Normal heart sounds. No murmur heard.     No gallop.   Pulmonary:      Effort: Pulmonary effort is normal. No respiratory distress.      Breath sounds: Normal breath sounds. No wheezing, rhonchi or rales.   Musculoskeletal:      Cervical back: Normal range of motion and neck supple.   Skin:     General: Skin is warm and dry.      Capillary Refill: Capillary refill takes less than 2 seconds.   Neurological:      Mental Status: He is alert and oriented to person, place, and time.   Psychiatric:         Behavior: Behavior normal.

## 2024-02-21 PROBLEM — H61.23 BILATERAL IMPACTED CERUMEN: Status: RESOLVED | Noted: 2020-11-04 | Resolved: 2024-02-21

## 2024-02-21 PROBLEM — Z12.5 PROSTATE CANCER SCREENING: Status: RESOLVED | Noted: 2020-01-29 | Resolved: 2024-02-21

## 2024-02-21 PROBLEM — Z12.11 COLON CANCER SCREENING: Status: RESOLVED | Noted: 2020-01-29 | Resolved: 2024-02-21

## 2024-02-21 PROBLEM — Z13.6 SCREENING FOR CARDIOVASCULAR, RESPIRATORY, AND GENITOURINARY DISEASES: Status: RESOLVED | Noted: 2020-01-29 | Resolved: 2024-02-21

## 2024-02-21 PROBLEM — Z13.83 SCREENING FOR CARDIOVASCULAR, RESPIRATORY, AND GENITOURINARY DISEASES: Status: RESOLVED | Noted: 2020-01-29 | Resolved: 2024-02-21

## 2024-02-21 PROBLEM — Z13.1 SCREENING FOR DIABETES MELLITUS (DM): Status: RESOLVED | Noted: 2020-01-29 | Resolved: 2024-02-21

## 2024-02-21 PROBLEM — Z00.00 HEALTHCARE MAINTENANCE: Status: RESOLVED | Noted: 2020-01-29 | Resolved: 2024-02-21

## 2024-02-21 PROBLEM — Z13.89 SCREENING FOR CARDIOVASCULAR, RESPIRATORY, AND GENITOURINARY DISEASES: Status: RESOLVED | Noted: 2020-01-29 | Resolved: 2024-02-21

## 2024-04-17 ENCOUNTER — RA CDI HCC (OUTPATIENT)
Dept: OTHER | Facility: HOSPITAL | Age: 64
End: 2024-04-17

## 2024-08-02 ENCOUNTER — OFFICE VISIT (OUTPATIENT)
Dept: URGENT CARE | Facility: CLINIC | Age: 64
End: 2024-08-02
Payer: COMMERCIAL

## 2024-08-02 VITALS
DIASTOLIC BLOOD PRESSURE: 80 MMHG | BODY MASS INDEX: 40.88 KG/M2 | RESPIRATION RATE: 18 BRPM | SYSTOLIC BLOOD PRESSURE: 140 MMHG | HEIGHT: 71 IN | OXYGEN SATURATION: 97 % | WEIGHT: 292 LBS | TEMPERATURE: 98.9 F | HEART RATE: 86 BPM

## 2024-08-02 DIAGNOSIS — J32.9 VIRAL SINUSITIS: Primary | ICD-10-CM

## 2024-08-02 DIAGNOSIS — I10 BENIGN ESSENTIAL HYPERTENSION: ICD-10-CM

## 2024-08-02 DIAGNOSIS — B97.89 VIRAL SINUSITIS: Primary | ICD-10-CM

## 2024-08-02 LAB
SARS-COV-2 AG UPPER RESP QL IA: NEGATIVE
VALID CONTROL: NORMAL

## 2024-08-02 PROCEDURE — 87811 SARS-COV-2 COVID19 W/OPTIC: CPT

## 2024-08-02 PROCEDURE — 99213 OFFICE O/P EST LOW 20 MIN: CPT

## 2024-08-02 RX ORDER — AMLODIPINE BESYLATE 10 MG/1
10 TABLET ORAL DAILY
Qty: 90 TABLET | Refills: 1 | Status: SHIPPED | OUTPATIENT
Start: 2024-08-02

## 2024-08-02 NOTE — PROGRESS NOTES
[facility]        NAME: Ryan Kulkarni is a 64 y.o. male  : 1960    MRN: [id number]  DATE: 2024  TIME: 6:14 PM    Assessment and Plan   Viral sinusitis [J32.9, B97.89]  1. Viral sinusitis  Poct Covid 19 Rapid Antigen Test        Rapid COVID negative in office. Symptoms likely viral. If not improving or worsening by early next week, an consider antibiotics at that time.     Patient Instructions       Most upper respiratory infections are viral and resolve on their own within 10-14 days. Antibiotics are not indicated for the viral infection, and are only prescribed if there is evidence for a bacterial infection. Sometimes an upper respiratory infection may lead to secondary bacterial infection, such as bacterial sinusitis, in which case antibiotics would be indicated at that time. If your symptoms continue beyond 10-14 days or if you experience ongoing fevers, productive cough with green, brown, bloody phlegm production, you may have developed a bacterial infection. For the uncomplicated viral upper respiratory infection conservative management includes:    Fever and pain control:  Ibuprofen (Motrin) 600mg every 6 hours for fever, headaches, body aches   Ibuprofen is an NSAID. Please stop medication if you experience stomach/abdominal pain and report to your primary care provider.   Ask your primary care provider before you take NSAIDs if you are on any blood thinners, or if you have a history of heart disease, kidney disease, gastric bypass surgery, GI bleed, or poorly controlled high blood pressure.   May use acetaminophen (Tylenol) as directed on the bottle between doses of ibuprofen. Do not exceed 4,000mg of Tylenol a day.   Cough & Congestion:  Guaifenesin (Mucinex) as directed on the bottle for congestion and mucous-y cough.   Dextromethorphan (Delsym, Robitussin) for dry cough and cough suppression   Pseudoephedrine (Sudafed) for congestion and sinus pressure   Sudafed may cause  increased heart rate, irregular heart rate, and an increase in blood pressure. Please do not take Sudafed if you have a history of heart disease or high blood pressure.   Sudafed should not be taken if you are on anti-depressants such as those belonging to the class MAOIs or tricyclics.  Coricidin HBP (chlorpheniramine maleate) can be used as a decongestant in place of other options for those unable to take Sudafed.   Combination cough and cold such as Dimetapp and Mucinex DM also available  Sudafed PE Head Congestion +Flu Severe contains a combination of Sudafed, Tylenol, Mucinex, and Delsym  If prescribed, take Tessalon Pearles or Bromfed/Phenergan DM as directed  Avoid taking prescription cough/congestion medication and OTC options at the same time  Sore Throat:  Cepacol lozenges  Chloraseptic spray  Throat Coat tea  Warm salt water gargles   Vitamin/Minerals:  Vitamin D3 2,000 IU daily  Vitamin C 1000mg twice a day  Some studies suggest that Zinc 12.5-15mg every 2 hours while awake for 5 days may shorten symptom duration by 1-2 days  Other:   Plenty of fluids and rest  Cool mist humidifiers  Nasal sinus rinses such as NettiPot, Neimed, or Navage can be used to help flush out sinuses  Please only use distilled/sterile water that can be purchased at your local pharmacy  Nasal spray options:  Nasal steroid sprays such as Flonase, Nasonex, Nasacort may help with sinus congestion, itchy/watery eyes, clogged ears  These options must be used consistently for at least 2 weeks for full effect  Afrin nasal spray for quick acting congestion relief  Saline nasal spray for dry nose, irritation of the nasal passages  Follow up with PCP in 3-5 days  Proceed to the ED if symptoms worsen      If tests are performed, our office will contact you with results only if changes need to made to the care plan discussed with you at the visit. You can review your full results on [facility].    Chief Complaint     Chief Complaint    Patient presents with    Cold Like Symptoms     Pt states he has sinus pressure, cough, and a head cold that started Monday.           History of Present Illness       URI   This is a new problem. The current episode started in the past 7 days. The problem has been unchanged. There has been no fever. Associated symptoms include congestion, coughing, headaches and a sore throat. Pertinent negatives include no abdominal pain, chest pain, nausea, rhinorrhea or vomiting. He has tried nothing for the symptoms.       Review of Systems   Review of Systems   Constitutional:  Positive for fatigue. Negative for chills and fever.   HENT:  Positive for congestion, sinus pressure and sore throat. Negative for postnasal drip, rhinorrhea and trouble swallowing.    Respiratory:  Positive for cough. Negative for chest tightness and shortness of breath.    Cardiovascular:  Negative for chest pain and palpitations.   Gastrointestinal:  Negative for abdominal pain, nausea and vomiting.   Genitourinary:  Negative for difficulty urinating.   Musculoskeletal:  Negative for myalgias.   Neurological:  Positive for headaches. Negative for dizziness.         Current Medications       Current Outpatient Medications:     amLODIPine (NORVASC) 10 mg tablet, Take 1 tablet (10 mg total) by mouth daily, Disp: 90 tablet, Rfl: 1    tadalafil (CIALIS) 10 MG tablet, Take 1 tablet (10 mg total) by mouth daily as needed for erectile dysfunction, Disp: 10 tablet, Rfl: 0    chlorthalidone 25 mg tablet, Take 1 tablet (25 mg total) by mouth daily (Patient not taking: Reported on 10/24/2023), Disp: 30 tablet, Rfl: 5    nicotine (NICODERM CQ) 14 mg/24hr TD 24 hr patch, Place 1 patch on the skin over 24 hours every 24 hours (Patient not taking: Reported on 10/24/2023), Disp: 28 patch, Rfl: 1    nicotine polacrilex (NICORETTE) 2 mg gum, Chew 1 each (2 mg total)  as needed in the morning for smoking cessation. (Patient not taking: Reported on 4/24/2023), Disp:  "100 each, Rfl: 0    Current Allergies     Allergies as of 08/02/2024 - Reviewed 08/02/2024   Allergen Reaction Noted    Other Allergic Rhinitis 07/25/2022            The following portions of the patient's history were reviewed and updated as appropriate: allergies, current medications, past family history, past medical history, past social history, past surgical history and problem list.     Past Medical History:   Diagnosis Date    Ankle edema 07/13/2015    Arthritis     Benign essential hypertension     COVID-19 november 2020    Obesity     Organic impotence     Seasonal allergies        Past Surgical History:   Procedure Laterality Date    JOINT REPLACEMENT Right 01/05/2021    right knee    [place] ARTHRP KNE CONDYLE&PLATU MEDIAL&LAT COMPARTMENTS Right 1/5/2021    Procedure: ARTHROPLASTY KNEE TOTAL;  Surgeon: Williams Ortega DO;  Location: [facility];  Service: Orthopedics    TONSILLECTOMY      US GUIDED MSK PROCEDURE  6/11/2020       Family History   Problem Relation Age of Onset    Alzheimer's disease Mother     Cancer Mother          Medications have been verified.        Objective   /80   Pulse 86   Temp 98.9 °F (37.2 °C)   Resp 18   Ht 5' 11\" (1.803 m)   Wt 132 kg (292 lb)   SpO2 97%   BMI 40.73 kg/m²        Physical Exam     Physical Exam  Constitutional:       General: He is not in acute distress.     Appearance: He is ill-appearing.   HENT:      Nose: Congestion present.      Right Sinus: No maxillary sinus tenderness or frontal sinus tenderness.      Left Sinus: No maxillary sinus tenderness or frontal sinus tenderness.      Mouth/Throat:      Mouth: Mucous membranes are moist.      Pharynx: Oropharynx is clear.   Eyes:      Pupils: Pupils are equal, round, and reactive to light.   Cardiovascular:      Rate and Rhythm: Normal rate and regular rhythm.      Pulses: Normal pulses.      Heart sounds: Normal heart sounds. No murmur heard.     No gallop.   Pulmonary:      Effort: Pulmonary effort " is normal. No respiratory distress.      Breath sounds: Normal breath sounds. No wheezing.   Abdominal:      General: Abdomen is flat. Bowel sounds are normal. There is no distension.      Palpations: Abdomen is soft.      Tenderness: There is no abdominal tenderness.   Musculoskeletal:         General: Normal range of motion.      Cervical back: Normal range of motion.   Skin:     General: Skin is warm and dry.      Capillary Refill: Capillary refill takes less than 2 seconds.   Neurological:      Mental Status: He is alert and oriented to person, place, and time.

## 2024-08-02 NOTE — LETTER
August 2, 2024     Patient: Ryan Kulkarni   YOB: 1960   Date of Visit: 8/2/2024       To Whom it May Concern:    Ryan Kulkarni was seen in my clinic on 8/2/2024. He may return to work on 8/5/2024 .    If you have any questions or concerns, please don't hesitate to call.         Sincerely,          Cece Lopez PA-C        CC: No Recipients

## 2024-08-02 NOTE — PATIENT INSTRUCTIONS
Most upper respiratory infections are viral and resolve on their own within 10-14 days. Antibiotics are not indicated for the viral infection, and are only prescribed if there is evidence for a bacterial infection. Sometimes an upper respiratory infection may lead to secondary bacterial infection, such as bacterial sinusitis, in which case antibiotics would be indicated at that time. If your symptoms continue beyond 10-14 days or if you experience ongoing fevers, productive cough with green, brown, bloody phlegm production, you may have developed a bacterial infection. For the uncomplicated viral upper respiratory infection conservative management includes:    Fever and pain control:  Ibuprofen (Motrin) 600mg every 6 hours for fever, headaches, body aches   Ibuprofen is an NSAID. Please stop medication if you experience stomach/abdominal pain and report to your primary care provider.   Ask your primary care provider before you take NSAIDs if you are on any blood thinners, or if you have a history of heart disease, kidney disease, gastric bypass surgery, GI bleed, or poorly controlled high blood pressure.   May use acetaminophen (Tylenol) as directed on the bottle between doses of ibuprofen. Do not exceed 4,000mg of Tylenol a day.   Cough & Congestion:  Guaifenesin (Mucinex) as directed on the bottle for congestion and mucous-y cough.   Dextromethorphan (Delsym, Robitussin) for dry cough and cough suppression   Pseudoephedrine (Sudafed) for congestion and sinus pressure   Sudafed may cause increased heart rate, irregular heart rate, and an increase in blood pressure. Please do not take Sudafed if you have a history of heart disease or high blood pressure.   Sudafed should not be taken if you are on anti-depressants such as those belonging to the class MAOIs or tricyclics.  Coricidin HBP (chlorpheniramine maleate) can be used as a decongestant in place of other options for those unable to take Sudafed.   Combination  cough and cold such as Dimetapp and Mucinex DM also available  Sudafed PE Head Congestion +Flu Severe contains a combination of Sudafed, Tylenol, Mucinex, and Delsym  If prescribed, take Tessalon Pearles or Bromfed/Phenergan DM as directed  Avoid taking prescription cough/congestion medication and OTC options at the same time  Sore Throat:  Cepacol lozenges  Chloraseptic spray  Throat Coat tea  Warm salt water gargles   Vitamin/Minerals:  Vitamin D3 2,000 IU daily  Vitamin C 1000mg twice a day  Some studies suggest that Zinc 12.5-15mg every 2 hours while awake for 5 days may shorten symptom duration by 1-2 days  Other:   Plenty of fluids and rest  Cool mist humidifiers  Nasal sinus rinses such as NettiPot, Neimed, or Navage can be used to help flush out sinuses  Please only use distilled/sterile water that can be purchased at your local pharmacy  Nasal spray options:  Nasal steroid sprays such as Flonase, Nasonex, Nasacort may help with sinus congestion, itchy/watery eyes, clogged ears  These options must be used consistently for at least 2 weeks for full effect  Afrin nasal spray for quick acting congestion relief  Saline nasal spray for dry nose, irritation of the nasal passages  Follow up with PCP in 3-5 days  Proceed to the ED if symptoms worsen\

## 2024-08-06 ENCOUNTER — OFFICE VISIT (OUTPATIENT)
Dept: URGENT CARE | Facility: CLINIC | Age: 64
End: 2024-08-06
Payer: COMMERCIAL

## 2024-08-06 VITALS
RESPIRATION RATE: 18 BRPM | WEIGHT: 292 LBS | DIASTOLIC BLOOD PRESSURE: 86 MMHG | HEIGHT: 71 IN | SYSTOLIC BLOOD PRESSURE: 136 MMHG | HEART RATE: 74 BPM | OXYGEN SATURATION: 98 % | TEMPERATURE: 97.5 F | BODY MASS INDEX: 40.88 KG/M2

## 2024-08-06 DIAGNOSIS — J32.9 VIRAL SINUSITIS: Primary | ICD-10-CM

## 2024-08-06 DIAGNOSIS — B97.89 VIRAL SINUSITIS: Primary | ICD-10-CM

## 2024-08-06 PROCEDURE — 99213 OFFICE O/P EST LOW 20 MIN: CPT

## 2024-08-06 NOTE — LETTER
August 6, 2024     Patient: Ryan Kulkarni   YOB: 1960   Date of Visit: 8/6/2024       To Whom it May Concern:    Ryan Kulkarni was seen in my clinic on 8/6/2024. He may return to work on 8/7/2024 .    If you have any questions or concerns, please don't hesitate to call.         Sincerely,          Cece Lopez PA-C        CC: No Recipients

## 2024-08-06 NOTE — PROGRESS NOTES
[facility]        NAME: Ryan Kulkarni is a 64 y.o. male  : 1960    MRN: [id number]  DATE: 2024  TIME: 11:16 AM    Assessment and Plan   Viral sinusitis [J32.9, B97.89]  1. Viral sinusitis          Sinusitis symptoms improving from last visit 24. Needs note to return to work.     Patient Instructions     Supportive care with rest, adequate hydration, and warm liquids   Over the counter cold/cough medicine as needed    Follow up with PCP in 3-5 days   Go to ER if worsening symptoms    If tests are performed, our office will contact you with results only if changes need to made to the care plan discussed with you at the visit. You can review your full results on [facility].    Chief Complaint     Chief Complaint   Patient presents with    Cold Like Symptoms     Symptoms are getting better but needs a work note.          History of Present Illness       Sinusitis  This is a new problem. The current episode started 1 to 4 weeks ago. The problem has been gradually improving since onset. There has been no fever. He is experiencing no pain. Associated symptoms include congestion and coughing. Pertinent negatives include no chills, headaches, hoarse voice, shortness of breath, sinus pressure, sneezing or sore throat. Treatments tried: Mucenex. The treatment provided moderate relief.       Review of Systems   Review of Systems   Constitutional:  Negative for chills and fever.   HENT:  Positive for congestion. Negative for hoarse voice, postnasal drip, rhinorrhea, sinus pressure, sneezing, sore throat and trouble swallowing.    Respiratory:  Positive for cough. Negative for chest tightness and shortness of breath.    Cardiovascular:  Negative for chest pain and palpitations.   Gastrointestinal:  Negative for abdominal pain, nausea and vomiting.   Genitourinary:  Negative for difficulty urinating.   Musculoskeletal:  Negative for myalgias.   Neurological:  Negative for dizziness and  headaches.         Current Medications       Current Outpatient Medications:     amLODIPine (NORVASC) 10 mg tablet, Take 1 tablet (10 mg total) by mouth daily, Disp: 90 tablet, Rfl: 1    tadalafil (CIALIS) 10 MG tablet, Take 1 tablet (10 mg total) by mouth daily as needed for erectile dysfunction, Disp: 10 tablet, Rfl: 0    chlorthalidone 25 mg tablet, Take 1 tablet (25 mg total) by mouth daily (Patient not taking: Reported on 10/24/2023), Disp: 30 tablet, Rfl: 5    nicotine (NICODERM CQ) 14 mg/24hr TD 24 hr patch, Place 1 patch on the skin over 24 hours every 24 hours (Patient not taking: Reported on 10/24/2023), Disp: 28 patch, Rfl: 1    nicotine polacrilex (NICORETTE) 2 mg gum, Chew 1 each (2 mg total)  as needed in the morning for smoking cessation. (Patient not taking: Reported on 4/24/2023), Disp: 100 each, Rfl: 0    Current Allergies     Allergies as of 08/06/2024 - Reviewed 08/06/2024   Allergen Reaction Noted    Other Allergic Rhinitis 07/25/2022            The following portions of the patient's history were reviewed and updated as appropriate: allergies, current medications, past family history, past medical history, past social history, past surgical history and problem list.     Past Medical History:   Diagnosis Date    Ankle edema 07/13/2015    Arthritis     Benign essential hypertension     COVID-19     november 2020    Obesity     Organic impotence     Seasonal allergies        Past Surgical History:   Procedure Laterality Date    JOINT REPLACEMENT Right 01/05/2021    right knee    WI ARTHRP KNE CONDYLE&PLATU MEDIAL&LAT COMPARTMENTS Right 1/5/2021    Procedure: ARTHROPLASTY KNEE TOTAL;  Surgeon: Williams Ortega DO;  Location: [facility];  Service: Orthopedics    TONSILLECTOMY      US GUIDED MSK PROCEDURE  6/11/2020       Family History   Problem Relation Age of Onset    Alzheimer's disease Mother     Cancer Mother          Medications have been verified.        Objective   /86   Pulse 74    "Temp 97.5 °F (36.4 °C)   Resp 18   Ht 5' 11\" (1.803 m)   Wt 132 kg (292 lb)   SpO2 98%   BMI 40.73 kg/m²        Physical Exam     Physical Exam  Constitutional:       General: He is not in acute distress.     Appearance: He is not ill-appearing.   HENT:      Nose: Congestion present.      Right Sinus: No maxillary sinus tenderness or frontal sinus tenderness.      Left Sinus: No maxillary sinus tenderness or frontal sinus tenderness.      Mouth/Throat:      Mouth: Mucous membranes are moist.      Pharynx: Oropharynx is clear.   Eyes:      Pupils: Pupils are equal, round, and reactive to light.   Cardiovascular:      Rate and Rhythm: Normal rate and regular rhythm.      Pulses: Normal pulses.      Heart sounds: Normal heart sounds. No murmur heard.     No gallop.   Pulmonary:      Effort: Pulmonary effort is normal. No respiratory distress.      Breath sounds: Normal breath sounds. No wheezing.   Abdominal:      General: Abdomen is flat. Bowel sounds are normal. There is no distension.      Palpations: Abdomen is soft.      Tenderness: There is no abdominal tenderness.   Musculoskeletal:         General: Normal range of motion.      Cervical back: Normal range of motion.   Skin:     General: Skin is warm and dry.      Capillary Refill: Capillary refill takes less than 2 seconds.   Neurological:      Mental Status: He is alert and oriented to person, place, and time.                   "

## 2024-08-06 NOTE — PATIENT INSTRUCTIONS
Supportive care with rest, adequate hydration, and warm liquids   Over the counter cold/cough medicine as needed    Follow up with PCP in 3-5 days   Go to ER if worsening symptoms

## 2025-08-06 ENCOUNTER — APPOINTMENT (EMERGENCY)
Dept: RADIOLOGY | Facility: HOSPITAL | Age: 65
DRG: 853 | End: 2025-08-06
Payer: COMMERCIAL

## 2025-08-06 ENCOUNTER — ANESTHESIA (INPATIENT)
Dept: PERIOP | Facility: HOSPITAL | Age: 65
DRG: 853 | End: 2025-08-06
Payer: COMMERCIAL

## 2025-08-06 ENCOUNTER — ANESTHESIA EVENT (INPATIENT)
Dept: PERIOP | Facility: HOSPITAL | Age: 65
DRG: 853 | End: 2025-08-06
Payer: COMMERCIAL

## 2025-08-06 ENCOUNTER — HOSPITAL ENCOUNTER (INPATIENT)
Facility: HOSPITAL | Age: 65
LOS: 5 days | Discharge: HOME | DRG: 853 | End: 2025-08-11
Admitting: SURGERY
Payer: COMMERCIAL

## 2025-08-06 ENCOUNTER — OFFICE VISIT (OUTPATIENT)
Dept: URGENT CARE | Facility: CLINIC | Age: 65
End: 2025-08-06
Payer: COMMERCIAL

## 2025-08-06 VITALS
RESPIRATION RATE: 24 BRPM | TEMPERATURE: 99.6 F | DIASTOLIC BLOOD PRESSURE: 79 MMHG | HEART RATE: 133 BPM | OXYGEN SATURATION: 96 % | SYSTOLIC BLOOD PRESSURE: 141 MMHG | WEIGHT: 305.6 LBS | BODY MASS INDEX: 42.62 KG/M2

## 2025-08-06 DIAGNOSIS — R65.10 SIRS (SYSTEMIC INFLAMMATORY RESPONSE SYNDROME) (HCC): ICD-10-CM

## 2025-08-06 DIAGNOSIS — R10.30 LOWER ABDOMINAL PAIN: Primary | ICD-10-CM

## 2025-08-06 PROBLEM — K66.8 PNEUMOPERITONEUM: Status: ACTIVE | Noted: 2025-08-06

## 2025-08-06 PROCEDURE — 99215 OFFICE O/P EST HI 40 MIN: CPT | Performed by: FAMILY MEDICINE

## 2025-08-06 RX ORDER — SUCCINYLCHOLINE/SOD CL,ISO/PF 100 MG/5ML
SYRINGE (ML) INTRAVENOUS AS NEEDED
Status: DISCONTINUED | OUTPATIENT
Start: 2025-08-06 | End: 2025-08-06

## 2025-08-06 RX ORDER — SODIUM CHLORIDE 9 MG/ML
INJECTION, SOLUTION INTRAVENOUS CONTINUOUS PRN
Status: DISCONTINUED | OUTPATIENT
Start: 2025-08-06 | End: 2025-08-06

## 2025-08-06 RX ORDER — ONDANSETRON 2 MG/ML
INJECTION INTRAMUSCULAR; INTRAVENOUS AS NEEDED
Status: DISCONTINUED | OUTPATIENT
Start: 2025-08-06 | End: 2025-08-06

## 2025-08-06 RX ORDER — DEXAMETHASONE SODIUM PHOSPHATE 10 MG/ML
INJECTION, SOLUTION INTRAMUSCULAR; INTRAVENOUS AS NEEDED
Status: DISCONTINUED | OUTPATIENT
Start: 2025-08-06 | End: 2025-08-06

## 2025-08-06 RX ORDER — ACETAMINOPHEN 10 MG/ML
INJECTION, SOLUTION INTRAVENOUS AS NEEDED
Status: DISCONTINUED | OUTPATIENT
Start: 2025-08-06 | End: 2025-08-06

## 2025-08-06 RX ORDER — PROPOFOL 10 MG/ML
INJECTION, EMULSION INTRAVENOUS AS NEEDED
Status: DISCONTINUED | OUTPATIENT
Start: 2025-08-06 | End: 2025-08-06

## 2025-08-06 RX ORDER — HYDROMORPHONE HCL/PF 1 MG/ML
SYRINGE (ML) INJECTION AS NEEDED
Status: DISCONTINUED | OUTPATIENT
Start: 2025-08-06 | End: 2025-08-06

## 2025-08-06 RX ORDER — ROCURONIUM BROMIDE 10 MG/ML
INJECTION, SOLUTION INTRAVENOUS AS NEEDED
Status: DISCONTINUED | OUTPATIENT
Start: 2025-08-06 | End: 2025-08-06

## 2025-08-06 RX ORDER — FENTANYL CITRATE 50 UG/ML
INJECTION, SOLUTION INTRAMUSCULAR; INTRAVENOUS AS NEEDED
Status: DISCONTINUED | OUTPATIENT
Start: 2025-08-06 | End: 2025-08-06

## 2025-08-06 RX ORDER — LIDOCAINE HYDROCHLORIDE 10 MG/ML
INJECTION, SOLUTION EPIDURAL; INFILTRATION; INTRACAUDAL; PERINEURAL AS NEEDED
Status: DISCONTINUED | OUTPATIENT
Start: 2025-08-06 | End: 2025-08-06

## 2025-08-06 RX ADMIN — SODIUM CHLORIDE, SODIUM LACTATE, POTASSIUM CHLORIDE, AND CALCIUM CHLORIDE: .6; .31; .03; .02 INJECTION, SOLUTION INTRAVENOUS at 20:47

## 2025-08-06 RX ADMIN — ACETAMINOPHEN 1000 MG: 10 INJECTION INTRAVENOUS at 21:46

## 2025-08-06 RX ADMIN — HYDROMORPHONE HYDROCHLORIDE 0.5 MG: 1 INJECTION, SOLUTION INTRAMUSCULAR; INTRAVENOUS; SUBCUTANEOUS at 21:23

## 2025-08-06 RX ADMIN — FENTANYL CITRATE 50 MCG: 50 INJECTION, SOLUTION INTRAMUSCULAR; INTRAVENOUS at 20:15

## 2025-08-06 RX ADMIN — HYDROMORPHONE HYDROCHLORIDE 1 MG: 1 INJECTION, SOLUTION INTRAMUSCULAR; INTRAVENOUS; SUBCUTANEOUS at 22:15

## 2025-08-06 RX ADMIN — SUGAMMADEX 400 MG: 100 INJECTION, SOLUTION INTRAVENOUS at 22:10

## 2025-08-06 RX ADMIN — SODIUM CHLORIDE: 0.9 INJECTION, SOLUTION INTRAVENOUS at 20:22

## 2025-08-06 RX ADMIN — PROPOFOL 200 MG: 10 INJECTION, EMULSION INTRAVENOUS at 20:15

## 2025-08-06 RX ADMIN — FENTANYL CITRATE 50 MCG: 50 INJECTION, SOLUTION INTRAMUSCULAR; INTRAVENOUS at 20:34

## 2025-08-06 RX ADMIN — HYDROMORPHONE HYDROCHLORIDE 0.5 MG: 1 INJECTION, SOLUTION INTRAMUSCULAR; INTRAVENOUS; SUBCUTANEOUS at 20:35

## 2025-08-06 RX ADMIN — ROCURONIUM BROMIDE 50 MG: 10 INJECTION, SOLUTION INTRAVENOUS at 20:23

## 2025-08-06 RX ADMIN — SODIUM CHLORIDE, SODIUM LACTATE, POTASSIUM CHLORIDE, AND CALCIUM CHLORIDE: .6; .31; .03; .02 INJECTION, SOLUTION INTRAVENOUS at 22:24

## 2025-08-06 RX ADMIN — CEFAZOLIN SODIUM 3000 MG: 3 SOLUTION INTRAVENOUS at 20:14

## 2025-08-06 RX ADMIN — ROCURONIUM BROMIDE 20 MG: 10 INJECTION, SOLUTION INTRAVENOUS at 20:54

## 2025-08-06 RX ADMIN — LIDOCAINE HYDROCHLORIDE 50 MG: 10 INJECTION, SOLUTION EPIDURAL; INFILTRATION; INTRACAUDAL; PERINEURAL at 20:15

## 2025-08-06 RX ADMIN — DEXAMETHASONE SODIUM PHOSPHATE 10 MG: 10 INJECTION, SOLUTION INTRAMUSCULAR; INTRAVENOUS at 20:19

## 2025-08-06 RX ADMIN — Medication 100 MG: at 20:15

## 2025-08-06 RX ADMIN — ONDANSETRON 4 MG: 2 INJECTION INTRAMUSCULAR; INTRAVENOUS at 20:22

## 2025-08-06 RX ADMIN — PHENYLEPHRINE HYDROCHLORIDE 30 MCG/MIN: 10 INJECTION INTRAVENOUS at 20:55

## 2025-08-06 RX ADMIN — ROCURONIUM BROMIDE 20 MG: 10 INJECTION, SOLUTION INTRAVENOUS at 21:38

## 2025-08-06 RX ADMIN — SODIUM CHLORIDE: 0.9 INJECTION, SOLUTION INTRAVENOUS at 20:56

## 2025-08-07 PROBLEM — A41.9 SEPSIS (HCC): Status: ACTIVE | Noted: 2025-08-07

## 2025-08-11 ENCOUNTER — TELEPHONE (OUTPATIENT)
Age: 65
End: 2025-08-11

## 2025-08-11 PROBLEM — G92.9 TOXIC ENCEPHALOPATHY: Status: ACTIVE | Noted: 2025-08-11

## 2025-08-12 ENCOUNTER — TELEPHONE (OUTPATIENT)
Age: 65
End: 2025-08-12

## 2025-08-13 ENCOUNTER — TRANSITIONAL CARE MANAGEMENT (OUTPATIENT)
Age: 65
End: 2025-08-13

## 2025-08-14 ENCOUNTER — OFFICE VISIT (OUTPATIENT)
Age: 65
End: 2025-08-14

## 2025-08-18 ENCOUNTER — OFFICE VISIT (OUTPATIENT)
Dept: SURGERY | Facility: CLINIC | Age: 65
End: 2025-08-18

## 2025-08-18 VITALS
WEIGHT: 299 LBS | SYSTOLIC BLOOD PRESSURE: 120 MMHG | TEMPERATURE: 97.5 F | OXYGEN SATURATION: 97 % | DIASTOLIC BLOOD PRESSURE: 80 MMHG | HEIGHT: 72 IN | BODY MASS INDEX: 40.5 KG/M2 | HEART RATE: 103 BPM

## 2025-08-18 DIAGNOSIS — E66.01 MORBID OBESITY WITH BMI OF 40.0-44.9, ADULT (HCC): Primary | ICD-10-CM

## 2025-08-18 DIAGNOSIS — K57.00: ICD-10-CM

## 2025-08-18 PROBLEM — D72.829 LEUKOCYTOSIS: Status: RESOLVED | Noted: 2021-01-07 | Resolved: 2025-08-18

## 2025-08-18 PROBLEM — E66.9 OBESITY (BMI 30-39.9): Status: RESOLVED | Noted: 2020-01-29 | Resolved: 2025-08-18

## 2025-08-18 PROBLEM — A41.9 SEPSIS (HCC): Status: RESOLVED | Noted: 2025-08-07 | Resolved: 2025-08-18

## 2025-08-18 PROBLEM — R06.09 DOE (DYSPNEA ON EXERTION): Status: RESOLVED | Noted: 2021-05-12 | Resolved: 2025-08-18

## 2025-08-18 PROBLEM — K66.8 PNEUMOPERITONEUM: Status: RESOLVED | Noted: 2025-08-06 | Resolved: 2025-08-18

## 2025-08-18 PROCEDURE — 99024 POSTOP FOLLOW-UP VISIT: CPT | Performed by: SURGERY

## 2025-08-21 ENCOUNTER — TELEPHONE (OUTPATIENT)
Age: 65
End: 2025-08-21

## (undated) DEVICE — GLOVE INDICATOR PI UNDERGLOVE SZ 8.5 BLUE

## (undated) DEVICE — SKIN MARKER DUAL TIP WITH RULER CAP, FLEXIBLE RULER AND LABELS: Brand: DEVON

## (undated) DEVICE — HANDPIECE SET WITH HIGH FLOW TIP AND SUCTION TUBE: Brand: INTERPULSE

## (undated) DEVICE — ASTOUND SURGICAL GOWN, XXX LARGE, X-LONG: Brand: CONVERTORS

## (undated) DEVICE — VEST SURGEON DISPOSABLE

## (undated) DEVICE — TIBURON EXTREMITY SHEET: Brand: CONVERTORS

## (undated) DEVICE — GLOVE SRG BIOGEL 8

## (undated) DEVICE — CHLORAPREP HI-LITE 26ML ORANGE

## (undated) DEVICE — PREP SURGICAL PURPREP 26ML

## (undated) DEVICE — GLOVE SRG BIOGEL ORTHOPEDIC 8.5

## (undated) DEVICE — SPINNING CEMENT MIXING BOWL

## (undated) DEVICE — SUT STRATAFIX SPIRAL 1-0  CT-1 30 X 30CM SXPD2B403

## (undated) DEVICE — 3M™ IOBAN™ 2 ANTIMICROBIAL INCISE DRAPE 6650EZ: Brand: IOBAN™ 2

## (undated) DEVICE — DUAL CUT SAGITTAL BLADE

## (undated) DEVICE — DRESSING MEPILEX AG BORDER 4 X 12 IN

## (undated) DEVICE — DRAPE SHEET X-LG

## (undated) DEVICE — GLOVE SRG BIOGEL 7.5

## (undated) DEVICE — LIGHT GLOVE GREEN

## (undated) DEVICE — BANDAGE, ESMARK LF STR 6"X9' (20/CS): Brand: CYPRESS

## (undated) DEVICE — SUT VICRYL 0 CP-1 27 IN J267H

## (undated) DEVICE — INTENDED FOR TISSUE SEPARATION, AND OTHER PROCEDURES THAT REQUIRE A SHARP SURGICAL BLADE TO PUNCTURE OR CUT.: Brand: BARD-PARKER ® CARBON RIB-BACK BLADES

## (undated) DEVICE — GLOVE INDICATOR PI UNDERGLOVE SZ 7.5 BLUE

## (undated) DEVICE — HOOD: Brand: FLYTE, SURGICOOL

## (undated) DEVICE — BASIC DOUBLE BASIN 2-LF: Brand: MEDLINE INDUSTRIES, INC.

## (undated) DEVICE — ADHESIVE SKIN CLOSR DERMABOND PRINEO

## (undated) DEVICE — INSTRUMENT POUCH: Brand: CONVERTORS

## (undated) DEVICE — SUT STRATAFIX SPIRAL 3-0 PGA/PCL 30 X 30 CM SXMD2B410

## (undated) DEVICE — CAPIT KNEE ATTUNE FB CEMENT - DEPUY

## (undated) DEVICE — ANTIBACTERIAL UNDYED BRAIDED (POLYGLACTIN 910), SYNTHETIC ABSORBABLE SUTURE: Brand: COATED VICRYL

## (undated) DEVICE — 3M™ STERI-DRAPE™ U-DRAPE 1015: Brand: STERI-DRAPE™

## (undated) DEVICE — ORTHOPEDIC PACK: Brand: CARDINAL HEALTH

## (undated) DEVICE — CUFF TOURNIQUET 34 X 4 IN QUICK CONNECT DISP 1BLA

## (undated) DEVICE — LINER FOOT PAD STERILE DISPOSABLE

## (undated) DEVICE — GLOVE INDICATOR PI UNDERGLOVE SZ 8 BLUE

## (undated) DEVICE — TRAY FOLEY 16FR URIMETER SURESTEP